# Patient Record
Sex: FEMALE | Race: WHITE | NOT HISPANIC OR LATINO | Employment: OTHER | URBAN - METROPOLITAN AREA
[De-identification: names, ages, dates, MRNs, and addresses within clinical notes are randomized per-mention and may not be internally consistent; named-entity substitution may affect disease eponyms.]

---

## 2018-06-04 NOTE — PROGRESS NOTES
6/6/2018    Kasey Jenkins  1935  [id number]    Discussion and Plan    Based on clinical presentation, appears the patient had urosepsis and a severe cystitis which is gradually resolving  Urinalysis is suspect today will be sent for culture  I have recommended a renal bladder ultrasound to assess overall structure  Dietary and hydration recommendations provided as well  She will continue Myrbetriq on and interim basis  Consideration for antibiotic prophylaxis will be given once the aforementioned studies are completed  All questions answered at this time  1  Urinary tract infection without hematuria, site unspecified  - POCT urine dip  - Urinalysis with microscopic; Future  - Urine culture; Future  - US kidney and bladder with pvr; Future    Assessment      Patient Active Problem List   Diagnosis    Urinary tract infection without hematuria       History of Present Illness    Leonor Lares is a 80 y o  female seen today in regards to a history of urinary tract infection  History is that of having georges urosepsis earlier this year requiring hospitalization  Was found have an E coli UTI at that time  She has persistent symptoms since then consisting of daytime urgency and frequency, significant nocturia and mild dysuria  Flow is otherwise good  Evaluation at that point showed no significant findings  Patient currently is on Myrbetriq  She denied any preceding history of recurrent urinary tract infections or urinary complaints  No prior history of genitourinary surgery  Patient does have a pessary      Urinary Symptom Assessment        Past Medical History  Past Medical History:   Diagnosis Date    Arthritis     Borderline diabetes     watches diet    Dry eye syndrome, bilateral     Full dentures     Gastric ulcer     dx in 9/2017    Glaucoma     had laser    Hyperlipidemia     controlled    Hypertension     controlled    Irregular heart beat 09/2017    "pre-mature" beat-saw cardiologist-12/17-Holter monitor 1/18    PONV (postoperative nausea and vomiting)     Wears glasses        Past Social History  Past Surgical History:   Procedure Laterality Date    APPENDECTOMY      age 6   [place] DILATION AND CURETTAGE OF UTERUS      EGD AND COLONOSCOPY      JOINT REPLACEMENT Bilateral     knees    ROTATOR CUFF REPAIR Left     titanium screw    TONSILLECTOMY         Past Family History  Family History   Problem Relation Age of Onset    Cancer Mother      breast    Heart disease Brother      CHF       Past Social history  Social History     Social History    Marital status: Unknown     Spouse name: N/A    Number of children: N/A    Years of education: N/A     Occupational History    Not on file       Social History Main Topics    Smoking status: Never Smoker    Smokeless tobacco: Never Used    Alcohol use Yes      Comment: occ    Drug use: No    Sexual activity: Not on file     Other Topics Concern    Not on file     Social History Narrative    No narrative on file       Current Medications  Current Outpatient Prescriptions   Medication Sig Dispense Refill    Acetaminophen 325 MG CAPS Take 650 mg by mouth every 6 (six) hours      ALPRAZolam (XANAX) 0 5 mg tablet Take by mouth 2 (two) times a day      ascorbic acid (VITAMIN C) 500 mg tablet Take 500 mg by mouth every morning      brimonidine (ALPHAGAN P) 0 1 % 1 drop 2 (two) times a day Each eye      Calcium Citrate (CITRACAL PO) Take by mouth every morning      CLINDAMYCIN HCL PO Take 600 mg by mouth as needed Prior to invasive procedures      latanoprost (XALATAN) 0 005 % ophthalmic solution Administer 1 drop to both eyes daily at bedtime      Mirabegron (MYRBETRIQ PO) Take by mouth      multivitamin (THERAGRAN) TABS Take 1 tablet by mouth every morning      Polyethyl Glycol-Propyl Glycol (SYSTANE OP) Apply to eye 4 (four) times a day Each eye      pravastatin (PRAVACHOL) 40 mg tablet Take 40 mg by mouth daily at bedtime  quinapril (ACCUPRIL) 10 mg tablet Take 10 mg by mouth every morning      rivaroxaban (XARELTO) 15 mg tablet Take 15 mg by mouth daily with dinner      omeprazole (PriLOSEC) 40 MG capsule Take 40 mg by mouth daily at bedtime       No current facility-administered medications for this visit  Allergies  Allergies   Allergen Reactions    Erythromycin GI Intolerance     "stomach burning", bruises    Minocycline GI Intolerance     "stomach burning", bruises    Penicillins GI Intolerance     "burning stomach", bruising    Propoxyphene GI Intolerance    Tetracycline GI Intolerance     "stomach burning"-bruising    Vicodin [Hydrocodone-Acetaminophen] GI Intolerance     "stomach burning" ,nausea    Avelox [Moxifloxacin] GI Intolerance     Avoids d/t N/V    Percocet [Oxycodone-Acetaminophen] GI Intolerance     Avoids d/t N/V    Sulfa Antibiotics Rash       Past Medical History, Social History, Family History, medications and allergies were reviewed  Review of Systems  Review of Systems   Constitutional: Negative  HENT: Negative  Eyes: Negative  Respiratory: Negative  Cardiovascular: Negative  Gastrointestinal: Negative  Endocrine: Negative  Genitourinary: Positive for urgency  Negative for decreased urine volume, difficulty urinating and frequency  Musculoskeletal: Negative  Skin: Negative  Allergic/Immunologic: Negative  Neurological: Negative  Hematological: Negative  Psychiatric/Behavioral: Negative  Vitals  Vitals:    06/06/18 0823   BP: 140/86   BP Location: Left arm   Patient Position: Sitting   Weight: 98 kg (216 lb)   Height: 5' 4" (1 626 m)         Physical Exam    Physical Exam   Constitutional: She is oriented to person, place, and time  She appears well-developed and well-nourished  Ambulates with walker   HENT:   Head: Normocephalic and atraumatic  Eyes: Pupils are equal, round, and reactive to light  Neck: Normal range of motion  Cardiovascular: Normal rate, regular rhythm and normal heart sounds  Pulmonary/Chest: Effort normal and breath sounds normal    Abdominal: Soft  Bowel sounds are normal  She exhibits no distension  There is no tenderness  There is no rebound and no guarding  Musculoskeletal: Normal range of motion  Neurological: She is alert and oriented to person, place, and time  Skin: Skin is warm, dry and intact  Psychiatric: She has a normal mood and affect  Nursing note and vitals reviewed        Results    Below listed labs, pathology results, and radiology images were personally reviewed:    No results found for: PSA  No results found for: GLUCOSE, CALCIUM, NA, K, CO2, CL, BUN, CREATININE  No results found for: WBC, HGB, HCT, MCV, PLT    Recent Results (from the past 1 hour(s))   POCT urine dip    Collection Time: 06/06/18  8:26 AM   Result Value Ref Range    LEUKOCYTE ESTERASE,UA mod      NITRITE,UA -     SL AMB POCT UROBILINOGEN -     SL AMB POCT URINE PROTEIN -      PH,UA 5 0      BLOOD,UA mod      SPECIFIC GRAVITY,UA 1 010      KETONES,UA -      BILIRUBIN,UA -     GLUCOSE, UA -      COLOR,UA yellow      CLARITY,UA transparent    ]    Care Everywhere Result Report  URINE [address]  Component Name Value Ref Range   Special Requests None     Culture Results 50,000 to 100,000 col/mL  **ESCHERICHIA COLI**     Specimen   Urine - Urine, clean voided (LAB)     Organism Antibiotic Method Susceptibility   **ESCHERICHIA COLI** PIP/TAZO GRAM NEGATIVE SUSCEPTIBILITY <=4: Susceptible    CEFEPIME GRAM NEGATIVE SUSCEPTIBILITY <=1: Susceptible    AZTREONAM GRAM NEGATIVE SUSCEPTIBILITY <=1: Susceptible    MEROPENEM GRAM NEGATIVE SUSCEPTIBILITY <=0 25: Susceptible    GENTAMICIN GRAM NEGATIVE SUSCEPTIBILITY <=1: Susceptible    CIPRO GRAM NEGATIVE SUSCEPTIBILITY <=0 25: Susceptible    TRIMETHOPRIM-SUL GRAM NEGATIVE SUSCEPTIBILITY <=20: Susceptible    AMPICILLIN GRAM NEGATIVE SUSCEPTIBILITY 4: Susceptible    CEFAZOLIN GRAM NEGATIVE SUSCEPTIBILITY <=4: Susceptible    CEFTRIAXONE GRAM NEGATIVE SUSCEPTIBILITY <=1: Susceptible    NITROFURANTOIN GRAM NEGATIVE SUSCEPTIBILITY <=16: Susceptible    CEFTAZIDIME   GRAM NEGATIVE SUSCEPTIBILITY

## 2018-06-06 ENCOUNTER — OFFICE VISIT (OUTPATIENT)
Dept: UROLOGY | Facility: CLINIC | Age: 83
End: 2018-06-06
Payer: MEDICARE

## 2018-06-06 VITALS
SYSTOLIC BLOOD PRESSURE: 140 MMHG | BODY MASS INDEX: 36.88 KG/M2 | DIASTOLIC BLOOD PRESSURE: 86 MMHG | WEIGHT: 216 LBS | HEIGHT: 64 IN

## 2018-06-06 DIAGNOSIS — N39.0 URINARY TRACT INFECTION WITHOUT HEMATURIA, SITE UNSPECIFIED: Primary | ICD-10-CM

## 2018-06-06 LAB
SL AMB  POCT GLUCOSE, UA: NORMAL
SL AMB LEUKOCYTE ESTERASE,UA: NORMAL
SL AMB POCT BILIRUBIN,UA: NORMAL
SL AMB POCT BLOOD,UA: NORMAL
SL AMB POCT CLARITY,UA: NORMAL
SL AMB POCT COLOR,UA: YELLOW
SL AMB POCT KETONES,UA: NORMAL
SL AMB POCT NITRITE,UA: NORMAL
SL AMB POCT PH,UA: 5
SL AMB POCT SPECIFIC GRAVITY,UA: 1.01
SL AMB POCT URINE PROTEIN: NORMAL
SL AMB POCT UROBILINOGEN: NORMAL

## 2018-06-06 PROCEDURE — 81002 URINALYSIS NONAUTO W/O SCOPE: CPT | Performed by: UROLOGY

## 2018-06-06 PROCEDURE — 99204 OFFICE O/P NEW MOD 45 MIN: CPT | Performed by: UROLOGY

## 2018-06-11 ENCOUNTER — APPOINTMENT (OUTPATIENT)
Dept: LAB | Facility: CLINIC | Age: 83
End: 2018-06-11
Payer: MEDICARE

## 2018-06-11 ENCOUNTER — TRANSCRIBE ORDERS (OUTPATIENT)
Dept: LAB | Facility: CLINIC | Age: 83
End: 2018-06-11

## 2018-06-11 DIAGNOSIS — N39.0 URINARY TRACT INFECTION WITHOUT HEMATURIA, SITE UNSPECIFIED: ICD-10-CM

## 2018-06-11 LAB
BACTERIA UR QL AUTO: ABNORMAL /HPF
BILIRUB UR QL STRIP: NEGATIVE
CLARITY UR: CLEAR
COLOR UR: YELLOW
GLUCOSE UR STRIP-MCNC: NEGATIVE MG/DL
HGB UR QL STRIP.AUTO: NEGATIVE
KETONES UR STRIP-MCNC: NEGATIVE MG/DL
LEUKOCYTE ESTERASE UR QL STRIP: ABNORMAL
NITRITE UR QL STRIP: NEGATIVE
NON-SQ EPI CELLS URNS QL MICRO: ABNORMAL /HPF
PH UR STRIP.AUTO: 5.5 [PH] (ref 4.5–8)
PROT UR STRIP-MCNC: NEGATIVE MG/DL
RBC #/AREA URNS AUTO: ABNORMAL /HPF
SP GR UR STRIP.AUTO: 1.01 (ref 1–1.03)
UROBILINOGEN UR QL STRIP.AUTO: 0.2 E.U./DL
WBC #/AREA URNS AUTO: ABNORMAL /HPF

## 2018-06-11 PROCEDURE — 81001 URINALYSIS AUTO W/SCOPE: CPT

## 2018-06-11 PROCEDURE — 87086 URINE CULTURE/COLONY COUNT: CPT

## 2018-06-12 ENCOUNTER — HOSPITAL ENCOUNTER (OUTPATIENT)
Dept: ULTRASOUND IMAGING | Facility: HOSPITAL | Age: 83
Discharge: HOME | End: 2018-06-12
Attending: UROLOGY
Payer: MEDICARE

## 2018-06-12 DIAGNOSIS — N39.0 URINARY TRACT INFECTION WITHOUT HEMATURIA, SITE UNSPECIFIED: ICD-10-CM

## 2018-06-12 LAB — BACTERIA UR CULT: NORMAL

## 2018-06-12 PROCEDURE — 51798 US URINE CAPACITY MEASURE: CPT

## 2018-06-18 NOTE — PROGRESS NOTES
6/20/2018    [facility]  1935  [id number]        Assessment  -History of urinary tract infection  -Stress urinary incontinence  -OAB    Discussion/Plan  Lam Slater is a 80 y o  female being managed by Dr Samm Garibay       -We reviewed results of recent urine culture which did not show any bacterial growth  Ultrasound identifies bilateral renal cysts, appearing simple in nature, no calculi, masses, or hydronephrosis noted  Incidental finding of pelvic cyst, will refer to GYN for further evaluation  Patient reports upcoming appointment with Dr Sheldon Dow in July 2018, patient will bring renal ultrasound report to office visit     -We discussed patient's ongoing urinary symptoms and cost of Myrbetriq  At this time patient will finish current prescription of Myrbetriq  She was advised to call office if she finds urinary symptoms of OAB become bothersome  Would try anticholinergic in the future  We discussed these medications and possible side effects  -If patient does well without any medications, will follow up in 1 year with repeat UA/Culture prior to visit     -All questions answered, patient and  agree with plan     History of Present Illness  80 y o  female with a history of urinary tract infection presents today for follow up  Patient hospitalized March of this year for urosepsis, positive E  Coli at [facility]  Patient states that this was first episode  She reports that initial symptoms prior to hospitalization was generalized weakness, which caused fall  Upon discharge she was prescribed Myrbetriq for urinary urgency, frequency, and nocturia up to 6x/night  Patient states that urinary symptoms were present prior to hospital admission  He states that medication has been costly and unsure if it has resolved urinary symptoms  Patient has pessary for urinary stress incontinence, managed by Dr Sheldon Dow  She denies any gross hematuria, dysuria, or signs of infection        Review of Systems  Review of Systems   Constitutional: Negative  HENT: Negative  Respiratory: Negative  Cardiovascular: Negative  Gastrointestinal: Negative  Genitourinary: Positive for frequency (nocturia up to 3x/night) and urgency  Negative for decreased urine volume, difficulty urinating, dysuria, flank pain and hematuria  Musculoskeletal: Negative  Skin: Negative  Neurological: Negative  Psychiatric/Behavioral: Negative  Past Medical History  Past Medical History:   Diagnosis Date    Arthritis     Borderline diabetes     watches diet    Dry eye syndrome, bilateral     Full dentures     Gastric ulcer     dx in 9/2017    Glaucoma     had laser    Hyperlipidemia     controlled    Hypertension     controlled    Irregular heart beat 09/2017    "pre-mature" beat-saw cardiologist-12/17-Holter monitor 1/18    PONV (postoperative nausea and vomiting)     Wears glasses        Past Social History  Past Surgical History:   Procedure Laterality Date    APPENDECTOMY      age 6   [place] DILATION AND CURETTAGE OF UTERUS      EGD AND COLONOSCOPY      JOINT REPLACEMENT Bilateral     knees    ROTATOR CUFF REPAIR Left     titanium screw    TONSILLECTOMY         Past Family History  Family History   Problem Relation Age of Onset    Cancer Mother         breast    Heart disease Brother         CHF       Past Social history  Social History     Social History    Marital status: /Civil Union     Spouse name: N/A    Number of children: N/A    Years of education: N/A     Occupational History    Not on file       Social History Main Topics    Smoking status: Never Smoker    Smokeless tobacco: Never Used    Alcohol use Yes      Comment: occ    Drug use: No    Sexual activity: Not on file     Other Topics Concern    Not on file     Social History Narrative    No narrative on file       Current Medications  Current Outpatient Prescriptions   Medication Sig Dispense Refill    Acetaminophen 325 MG CAPS Take 650 mg by mouth every 6 (six) hours      ALPRAZolam (XANAX) 0 5 mg tablet Take by mouth 2 (two) times a day      ascorbic acid (VITAMIN C) 500 mg tablet Take 500 mg by mouth every morning      brimonidine (ALPHAGAN P) 0 1 % 1 drop 2 (two) times a day Each eye      Calcium Citrate (CITRACAL PO) Take by mouth every morning      CLINDAMYCIN HCL PO Take 600 mg by mouth as needed Prior to invasive procedures      latanoprost (XALATAN) 0 005 % ophthalmic solution Administer 1 drop to both eyes daily at bedtime      Mirabegron (MYRBETRIQ PO) Take by mouth      multivitamin (THERAGRAN) TABS Take 1 tablet by mouth every morning      omeprazole (PriLOSEC) 40 MG capsule Take 40 mg by mouth daily at bedtime      Polyethyl Glycol-Propyl Glycol (SYSTANE OP) Apply to eye 4 (four) times a day Each eye      pravastatin (PRAVACHOL) 40 mg tablet Take 40 mg by mouth daily at bedtime      quinapril (ACCUPRIL) 10 mg tablet Take 10 mg by mouth every morning      rivaroxaban (XARELTO) 15 mg tablet Take 15 mg by mouth daily with dinner       No current facility-administered medications for this visit  Allergies  Allergies   Allergen Reactions    Erythromycin GI Intolerance     "stomach burning", bruises    Minocycline GI Intolerance     "stomach burning", bruises    Penicillins GI Intolerance     "burning stomach", bruising    Propoxyphene GI Intolerance    Tetracycline GI Intolerance     "stomach burning"-bruising    Vicodin [Hydrocodone-Acetaminophen] GI Intolerance     "stomach burning" ,nausea    Avelox [Moxifloxacin] GI Intolerance     Avoids d/t N/V    Percocet [Oxycodone-Acetaminophen] GI Intolerance     Avoids d/t N/V    Sulfa Antibiotics Rash       Past medical history, social history, family history, medications and allergies were reviewed  Vitals  There were no vitals filed for this visit  Physical Exam  Physical Exam   Constitutional: She is oriented to person, place, and time  She appears well-developed and well-nourished  HENT:   Head: Normocephalic  Eyes: Pupils are equal, round, and reactive to light  Neck: Normal range of motion  Cardiovascular: Normal rate and regular rhythm  Pulmonary/Chest: Effort normal    Abdominal: Soft  Normal appearance  There is no CVA tenderness  Musculoskeletal: Normal range of motion  Uses walker     Neurological: She is alert and oriented to person, place, and time  She has normal reflexes  Skin: Skin is warm and dry  Psychiatric: She has a normal mood and affect  Her behavior is normal  Judgment and thought content normal        Results    I have personally reviewed all pertinent lab results and reviewed with patient  No results found for: GLUCOSE, CALCIUM, NA, K, CO2, CL, BUN, CREATININE  No results found for: WBC, HGB, HCT, MCV, PLT  No results found for this or any previous visit (from the past 1 hour(s))

## 2018-06-20 ENCOUNTER — OFFICE VISIT (OUTPATIENT)
Dept: UROLOGY | Facility: CLINIC | Age: 83
End: 2018-06-20
Payer: MEDICARE

## 2018-06-20 VITALS
HEIGHT: 64 IN | BODY MASS INDEX: 36.88 KG/M2 | DIASTOLIC BLOOD PRESSURE: 80 MMHG | SYSTOLIC BLOOD PRESSURE: 140 MMHG | WEIGHT: 216 LBS

## 2018-06-20 DIAGNOSIS — Z87.440 HISTORY OF UTI: Primary | ICD-10-CM

## 2018-06-20 DIAGNOSIS — N32.81 OAB (OVERACTIVE BLADDER): ICD-10-CM

## 2018-06-20 DIAGNOSIS — N39.3 URINARY, INCONTINENCE, STRESS FEMALE: ICD-10-CM

## 2018-06-20 PROCEDURE — 99213 OFFICE O/P EST LOW 20 MIN: CPT | Performed by: NURSE PRACTITIONER

## 2018-06-20 NOTE — LETTER
June 20, 2018     Paulo Angelo  [address] [id number]    Patient: Marcella Bearden   YOB: 1935   Date of Visit: 6/20/2018       Dear Dr Nadine Farrell:    Thank you for referring Marcella Bearden to me for evaluation  Below are my notes for this consultation  If you have questions, please do not hesitate to call me  I look forward to following your patient along with you  Sincerely,        CLARA Prather        CC: No Recipients  CLARA Prather  6/20/2018  9:25 AM  Sign at close encounter  6/20/2018    Marcella Bearden  1935  [id number]        Assessment  -History of urinary tract infection  -Stress urinary incontinence  -OAB    Discussion/Plan  Dali Price is a 80 y o  female being managed by Dr Maria L Thompson       -We reviewed results of recent urine culture which did not show any bacterial growth  Ultrasound identifies bilateral renal cysts, appearing simple in nature, no calculi, masses, or hydronephrosis noted  Incidental finding of pelvic cyst, will refer to GYN for further evaluation  Patient reports upcoming appointment with Dr Maida Ordonez in July 2018, patient will bring renal ultrasound report to office visit     -We discussed patient's ongoing urinary symptoms and cost of Myrbetriq  At this time patient will finish current prescription of Myrbetriq  She was advised to call office if she finds urinary symptoms of OAB become bothersome  Would try anticholinergic in the future  We discussed these medications and possible side effects  -If patient does well without any medications, will follow up in 1 year with repeat UA/Culture prior to visit     -All questions answered, patient and  agree with plan     History of Present Illness  80 y o  female with a history of urinary tract infection presents today for follow up  Patient hospitalized March of this year for urosepsis, positive E  Coli at [facility]    Patient states that this was first episode  She reports that initial symptoms prior to hospitalization was generalized weakness, which caused fall  Upon discharge she was prescribed Myrbetriq for urinary urgency, frequency, and nocturia up to 6x/night  Patient states that urinary symptoms were present prior to hospital admission  He states that medication has been costly and unsure if it has resolved urinary symptoms  Patient has pessary for urinary stress incontinence, managed by Dr Analisa Mcmillan  She denies any gross hematuria, dysuria, or signs of infection  Review of Systems  Review of Systems   Constitutional: Negative  HENT: Negative  Respiratory: Negative  Cardiovascular: Negative  Gastrointestinal: Negative  Genitourinary: Positive for frequency (nocturia up to 3x/night) and urgency  Negative for decreased urine volume, difficulty urinating, dysuria, flank pain and hematuria  Musculoskeletal: Negative  Skin: Negative  Neurological: Negative  Psychiatric/Behavioral: Negative          Past Medical History  Past Medical History:   Diagnosis Date    Arthritis     Borderline diabetes     watches diet    Dry eye syndrome, bilateral     Full dentures     Gastric ulcer     dx in 9/2017    Glaucoma     had laser    Hyperlipidemia     controlled    Hypertension     controlled    Irregular heart beat 09/2017    "pre-mature" beat-saw cardiologist-12/17-Holter monitor 1/18    PONV (postoperative nausea and vomiting)     Wears glasses        Past Social History  Past Surgical History:   Procedure Laterality Date    APPENDECTOMY      age 6   [employer] DILATION AND CURETTAGE OF UTERUS      EGD AND COLONOSCOPY      JOINT REPLACEMENT Bilateral     knees    ROTATOR CUFF REPAIR Left     titanium screw    TONSILLECTOMY         Past Family History  Family History   Problem Relation Age of Onset    Cancer Mother         breast    Heart disease Brother         CHF       Past Social history  Social History     Social History    Marital status: /Civil Union     Spouse name: N/A    Number of children: N/A    Years of education: N/A     Occupational History    Not on file  Social History Main Topics    Smoking status: Never Smoker    Smokeless tobacco: Never Used    Alcohol use Yes      Comment: occ    Drug use: No    Sexual activity: Not on file     Other Topics Concern    Not on file     Social History Narrative    No narrative on file       Current Medications  Current Outpatient Prescriptions   Medication Sig Dispense Refill    Acetaminophen 325 MG CAPS Take 650 mg by mouth every 6 (six) hours      ALPRAZolam (XANAX) 0 5 mg tablet Take by mouth 2 (two) times a day      ascorbic acid (VITAMIN C) 500 mg tablet Take 500 mg by mouth every morning      brimonidine (ALPHAGAN P) 0 1 % 1 drop 2 (two) times a day Each eye      Calcium Citrate (CITRACAL PO) Take by mouth every morning      CLINDAMYCIN HCL PO Take 600 mg by mouth as needed Prior to invasive procedures      latanoprost (XALATAN) 0 005 % ophthalmic solution Administer 1 drop to both eyes daily at bedtime      Mirabegron (MYRBETRIQ PO) Take by mouth      multivitamin (THERAGRAN) TABS Take 1 tablet by mouth every morning      omeprazole (PriLOSEC) 40 MG capsule Take 40 mg by mouth daily at bedtime      Polyethyl Glycol-Propyl Glycol (SYSTANE OP) Apply to eye 4 (four) times a day Each eye      pravastatin (PRAVACHOL) 40 mg tablet Take 40 mg by mouth daily at bedtime      quinapril (ACCUPRIL) 10 mg tablet Take 10 mg by mouth every morning      rivaroxaban (XARELTO) 15 mg tablet Take 15 mg by mouth daily with dinner       No current facility-administered medications for this visit          Allergies  Allergies   Allergen Reactions    Erythromycin GI Intolerance     "stomach burning", bruises    Minocycline GI Intolerance     "stomach burning", bruises    Penicillins GI Intolerance     "burning stomach", bruising    Propoxyphene GI Intolerance    Tetracycline GI Intolerance     "stomach burning"-bruising    Vicodin [Hydrocodone-Acetaminophen] GI Intolerance     "stomach burning" ,nausea    Avelox [Moxifloxacin] GI Intolerance     Avoids d/t N/V    Percocet [Oxycodone-Acetaminophen] GI Intolerance     Avoids d/t N/V    Sulfa Antibiotics Rash       Past medical history, social history, family history, medications and allergies were reviewed  Vitals  There were no vitals filed for this visit  Physical Exam  Physical Exam   Constitutional: She is oriented to person, place, and time  She appears well-developed and well-nourished  HENT:   Head: Normocephalic  Eyes: Pupils are equal, round, and reactive to light  Neck: Normal range of motion  Cardiovascular: Normal rate and regular rhythm  Pulmonary/Chest: Effort normal    Abdominal: Soft  Normal appearance  There is no CVA tenderness  Musculoskeletal: Normal range of motion  Uses walker     Neurological: She is alert and oriented to person, place, and time  She has normal reflexes  Skin: Skin is warm and dry  Psychiatric: She has a normal mood and affect  Her behavior is normal  Judgment and thought content normal        Results    I have personally reviewed all pertinent lab results and reviewed with patient  No results found for: GLUCOSE, CALCIUM, NA, K, CO2, CL, BUN, CREATININE  No results found for: WBC, HGB, HCT, MCV, PLT  No results found for this or any previous visit (from the past 1 hour(s))

## 2018-06-25 ENCOUNTER — TRANSCRIBE ORDERS (OUTPATIENT)
Dept: ADMINISTRATIVE | Facility: HOSPITAL | Age: 83
End: 2018-06-25

## 2018-06-25 DIAGNOSIS — R19.00 PELVIC MASS: Primary | ICD-10-CM

## 2018-06-29 ENCOUNTER — HOSPITAL ENCOUNTER (OUTPATIENT)
Dept: ULTRASOUND IMAGING | Facility: HOSPITAL | Age: 83
Discharge: HOME | End: 2018-06-29
Payer: MEDICARE

## 2018-06-29 DIAGNOSIS — R19.00 PELVIC MASS: ICD-10-CM

## 2018-06-29 PROCEDURE — 76856 US EXAM PELVIC COMPLETE: CPT

## 2018-09-10 ENCOUNTER — TRANSCRIBE ORDERS (OUTPATIENT)
Dept: ADMINISTRATIVE | Facility: HOSPITAL | Age: 83
End: 2018-09-10

## 2018-09-10 DIAGNOSIS — D49.59 OVARIAN NEOPLASM: Primary | ICD-10-CM

## 2018-10-01 ENCOUNTER — HOSPITAL ENCOUNTER (OUTPATIENT)
Dept: ULTRASOUND IMAGING | Facility: HOSPITAL | Age: 83
Discharge: HOME | End: 2018-10-01
Payer: MEDICARE

## 2018-10-01 DIAGNOSIS — D49.59 OVARIAN NEOPLASM: ICD-10-CM

## 2018-10-01 PROCEDURE — 76856 US EXAM PELVIC COMPLETE: CPT

## 2019-08-07 DIAGNOSIS — Z87.440 HISTORY OF UTI: Primary | ICD-10-CM

## 2019-08-09 NOTE — PROGRESS NOTES
8/13/2019    Vijaya Gray  1935  [id number]    Discussion and Plan      Patient appears to have either asymptomatic bacteriuria or contaminated culture specimen at this juncture  As she is without significant urinary symptoms, no antibiotic will be prescribed at this point  Advised he contact office should new urinary symptoms developed  Patient had noted vaginal itchiness for which he had been using Vaseline cream   I discussed water based a agents are better and have recommended that is still  She will otherwise return in 1 year for routine evaluation  1  Urinary tract infection without hematuria, site unspecified  - Urinalysis with microscopic; Future  - Urine culture; Future      Assessment      Patient Active Problem List   Diagnosis    Urinary tract infection without hematuria       History of Present Illness    Biranna Tate is a 80 y o  female seen today in regards to a history of  Urinary tract infections  She denies any recent changes in urinary pattern noting good flow with complete bladder emptying sensation with occasional frequency  She also has nocturia x3 which is a long-standing pattern  Has commode at bedside  Denies gross hematuria  No fever or chills  Recent urine results show external contamination though E coli present on culture      Urinary Symptom Assessment        Past Medical History  Past Medical History:   Diagnosis Date    Arthritis     Borderline diabetes     watches diet    Dry eye syndrome, bilateral     Full dentures     Gastric ulcer     dx in 9/2017    Glaucoma     had laser    Hyperlipidemia     controlled    Hypertension     controlled    Irregular heart beat 09/2017    "pre-mature" beat-saw cardiologist-12/17-Holter monitor 1/18    PONV (postoperative nausea and vomiting)     Wears glasses        Past Social History  Past Surgical History:   Procedure Laterality Date    APPENDECTOMY      age 6   [employer] [address] EGD AND COLONOSCOPY      JOINT REPLACEMENT Bilateral     knees    ROTATOR CUFF REPAIR Left     titanium screw    TONSILLECTOMY         Past Family History  Family History   Problem Relation Age of Onset    Cancer Mother         breast    Heart disease Brother         CHF       Past Social history  Social History     Socioeconomic History    Marital status: /Civil Union     Spouse name: Not on file    Number of children: Not on file    Years of education: Not on file    Highest education level: Not on file   Occupational History    Not on file   Social Needs    Financial resource strain: Not on file    Food insecurity:     Worry: Not on file     Inability: Not on file    Transportation needs:     Medical: Not on file     Non-medical: Not on file   Tobacco Use    Smoking status: Never Smoker    Smokeless tobacco: Never Used   Substance and Sexual Activity    Alcohol use: Yes     Comment: occ    Drug use: No    Sexual activity: Not on file   Lifestyle    Physical activity:     Days per week: Not on file     Minutes per session: Not on file    Stress: Not on file   Relationships    Social connections:     Talks on phone: Not on file     Gets together: Not on file     Attends Sabianism service: Not on file     Active member of club or organization: Not on file     Attends meetings of clubs or organizations: Not on file     Relationship status: Not on file    Intimate partner violence:     Fear of current or ex partner: Not on file     Emotionally abused: Not on file     Physically abused: Not on file     Forced sexual activity: Not on file   Other Topics Concern    Not on file   Social History Narrative    Not on file       Current Medications  Current Outpatient Medications   Medication Sig Dispense Refill    Acetaminophen 325 MG CAPS Take 650 mg by mouth every 6 (six) hours      ALPRAZolam (XANAX) 0 5 mg tablet Take by mouth 2 (two) times a day      apixaban (ELIQUIS) 5 mg Take 5 mg by mouth 2 (two) times a day      ascorbic acid (VITAMIN C) 500 mg tablet Take 500 mg by mouth every morning      brimonidine (ALPHAGAN P) 0 1 % 1 drop 2 (two) times a day Each eye      Calcium Citrate (CITRACAL PO) Take by mouth every morning      CLINDAMYCIN HCL PO Take 600 mg by mouth as needed Prior to invasive procedures      latanoprost (XALATAN) 0 005 % ophthalmic solution Administer 1 drop to both eyes daily at bedtime      multivitamin (THERAGRAN) TABS Take 1 tablet by mouth every morning      omeprazole (PriLOSEC) 40 MG capsule Take 40 mg by mouth daily at bedtime      Polyethyl Glycol-Propyl Glycol (SYSTANE OP) Apply to eye 4 (four) times a day Each eye      pravastatin (PRAVACHOL) 40 mg tablet Take 40 mg by mouth daily at bedtime      Propylene Glycol (SYSTANE BALANCE) 0 6 % SOLN Systane Balance 0 6 % eye drops      quinapril (ACCUPRIL) 10 mg tablet Take 10 mg by mouth every morning      rivaroxaban (XARELTO) 15 mg tablet Take 15 mg by mouth daily with dinner       No current facility-administered medications for this visit  Allergies  Allergies   Allergen Reactions    Erythromycin GI Intolerance     "stomach burning", bruises    Minocycline GI Intolerance     "stomach burning", bruises    Penicillins GI Intolerance     "burning stomach", bruising    Propoxyphene GI Intolerance    Tetracycline GI Intolerance     "stomach burning"-bruising    Vicodin [Hydrocodone-Acetaminophen] GI Intolerance     "stomach burning" ,nausea    Avelox [Moxifloxacin] GI Intolerance     Avoids d/t N/V    Percocet [Oxycodone-Acetaminophen] GI Intolerance     Avoids d/t N/V    Sulfa Antibiotics Rash       Past Medical History, Social History, Family History, medications and allergies were reviewed  Review of Systems  Review of Systems   Constitutional: Negative  HENT: Negative  Eyes: Negative  Respiratory: Negative  Cardiovascular: Negative  Gastrointestinal: Negative  Endocrine: Negative  Genitourinary: Positive for urgency  Negative for difficulty urinating and hematuria  Musculoskeletal: Negative  Skin: Negative  Allergic/Immunologic: Negative  Neurological: Negative  Hematological: Negative  Psychiatric/Behavioral: Negative  Vitals  Vitals:    08/13/19 0854   BP: 130/88   Pulse: 70   Weight: 95 8 kg (211 lb 3 2 oz)   Height: 5' 4" (1 626 m)         Physical Exam    Physical Exam   Constitutional: She is oriented to person, place, and time  She appears well-developed and well-nourished  Uses walker   HENT:   Head: Normocephalic and atraumatic  Eyes: Pupils are equal, round, and reactive to light  Neck: Normal range of motion  Cardiovascular: Normal rate, regular rhythm and normal heart sounds  Pulmonary/Chest: Effort normal and breath sounds normal    Abdominal: Soft  Bowel sounds are normal  She exhibits no distension  There is no tenderness  There is no rebound and no guarding  Musculoskeletal: Normal range of motion  Neurological: She is alert and oriented to person, place, and time  Skin: Skin is warm, dry and intact  Psychiatric: She has a normal mood and affect  Nursing note and vitals reviewed  Results    Below listed labs, pathology results, and radiology images were personally reviewed:    No results found for: PSA  No results found for: GLUCOSE, CALCIUM, NA, K, CO2, CL, BUN, CREATININE  No results found for: WBC, HGB, HCT, MCV, PLT    No results found for this or any previous visit (from the past 1 hour(s))  ]     Ref Range & Units 8/8/19  9:35 AM   Specific Gravity, Urine 1 003 - 1 030  1 010    pH, Urine 4 5 - 8 0  6    Protein, Urine Negative mg/dL Negative    Glucose, Urine Negative mg/dL Negative    Ketone, Urine Negative mg/dL Negative    Blood, Urine Negative mg/dL 0 03-0  19Abnormal     Leukocytes, Urine Negative /uL 500Abnormal     Nitrite, Urine Negative  Negative    Comment   SEE NOTES    Comment: Microscopic to follow  RBC/HPF 0 - 2 /hpf 21-50    WBC/HPF 0 - 5 /hpf >100    Bacteria Negative  4+Abnormal     Squamous Epithelial Cells 0 - 5 /lpf >10    Transitional Epithelial Cells 0 - 1 /lpf >10    Mucous Threads   Few    Specimen Collected: 08/08/19  9:35 AM Last Resulted: 08/08/19  1:15 PM   Received From: [facility]  Result Received: 08/09/19  7:45 AM

## 2019-08-13 ENCOUNTER — OFFICE VISIT (OUTPATIENT)
Dept: UROLOGY | Facility: CLINIC | Age: 84
End: 2019-08-13
Payer: MEDICARE

## 2019-08-13 VITALS
DIASTOLIC BLOOD PRESSURE: 88 MMHG | HEART RATE: 70 BPM | SYSTOLIC BLOOD PRESSURE: 130 MMHG | HEIGHT: 64 IN | WEIGHT: 211.2 LBS | BODY MASS INDEX: 36.06 KG/M2

## 2019-08-13 DIAGNOSIS — N39.0 URINARY TRACT INFECTION WITHOUT HEMATURIA, SITE UNSPECIFIED: Primary | ICD-10-CM

## 2019-08-13 PROCEDURE — 99214 OFFICE O/P EST MOD 30 MIN: CPT | Performed by: UROLOGY

## 2020-01-09 ENCOUNTER — OFFICE VISIT (OUTPATIENT)
Dept: UROLOGY | Facility: CLINIC | Age: 85
End: 2020-01-09
Payer: MEDICARE

## 2020-01-09 VITALS
HEIGHT: 64 IN | WEIGHT: 214 LBS | SYSTOLIC BLOOD PRESSURE: 122 MMHG | DIASTOLIC BLOOD PRESSURE: 78 MMHG | BODY MASS INDEX: 36.54 KG/M2 | HEART RATE: 78 BPM

## 2020-01-09 DIAGNOSIS — N39.3 URINARY, INCONTINENCE, STRESS FEMALE: ICD-10-CM

## 2020-01-09 DIAGNOSIS — N32.81 OAB (OVERACTIVE BLADDER): Primary | ICD-10-CM

## 2020-01-09 DIAGNOSIS — N39.0 URINARY TRACT INFECTION WITHOUT HEMATURIA, SITE UNSPECIFIED: ICD-10-CM

## 2020-01-09 LAB — POST-VOID RESIDUAL VOLUME, ML POC: 83 ML

## 2020-01-09 PROCEDURE — 99213 OFFICE O/P EST LOW 20 MIN: CPT | Performed by: PHYSICIAN ASSISTANT

## 2020-01-09 PROCEDURE — 51798 US URINE CAPACITY MEASURE: CPT | Performed by: PHYSICIAN ASSISTANT

## 2020-01-09 RX ORDER — TROSPIUM CHLORIDE 20 MG/1
20 TABLET, FILM COATED ORAL 2 TIMES DAILY
Qty: 60 TABLET | Refills: 3 | Status: SHIPPED | OUTPATIENT
Start: 2020-01-09 | End: 2020-06-04 | Stop reason: DRUGHIGH

## 2020-01-09 NOTE — PROGRESS NOTES
1  OAB (overactive bladder)    - POCT Measure PVR    2  Urinary, incontinence, stress female    - POCT Measure PVR    3  Urinary tract infection without hematuria, site unspecified    - POCT Measure PVR          Assessment and plan:       1  Urinary frequency  - Patient has previously tried Myrbetriq with which worked well but was cost   - We discussed a trial of trospium as this has the lowest risk of confusion of the anticholinergic medications  - She will return in 6 weeks for symptom reassessment and will have PVR performed at that time  Corrine Cowan PA-C      Chief Complaint     Chief Complaint   Patient presents with    Urinary Tract Infection    Nocturia    OAB         History of Present Illness     Linus Severino is a 80 y o  female patient previously managed by Dr Aditya Lira for lower urinary tract symptoms including frequency, primarily nocturia, and a urinary tract infection  Patient was previously trialed on Myrbetriq which cost she and her  approximately $500 per month  This was discontinued in October and patient has had significant symptom return since that time  She reports that she is currently waking up 5 times nightly to urinate  She has a bedside commode to limit her need for ambulation overnight as she requires a walker  She is emptying reasonably well today with a PVR of 83 mL  She does have a history of bladder prolapse which is treated with a pessary  Patient denies any leakage of urine  Laboratory     No results found for: CREATININE    No results found for: PSA    Recent Results (from the past 1 hour(s))   POCT Measure PVR    Collection Time: 01/09/20 11:19 AM   Result Value Ref Range    POST-VOID RESIDUAL VOLUME, ML POC 83 mL         Review of Systems     Review of Systems   Constitutional: Negative for chills and fever  HENT: Negative  Eyes: Negative  Respiratory: Negative for shortness of breath  Cardiovascular: Negative for chest pain  Gastrointestinal: Negative for constipation, diarrhea, nausea and vomiting  Genitourinary: Positive for frequency and urgency  Negative for difficulty urinating, dysuria, enuresis, flank pain and hematuria  Musculoskeletal: Negative  Allergies     Allergies   Allergen Reactions    Erythromycin GI Intolerance     "stomach burning", bruises    Minocycline GI Intolerance     "stomach burning", bruises    Penicillins GI Intolerance     "burning stomach", bruising    Propoxyphene GI Intolerance    Tetracycline GI Intolerance     "stomach burning"-bruising    Vicodin [Hydrocodone-Acetaminophen] GI Intolerance     "stomach burning" ,nausea    Avelox [Moxifloxacin] GI Intolerance     Avoids d/t N/V    Percocet [Oxycodone-Acetaminophen] GI Intolerance     Avoids d/t N/V    Sulfa Antibiotics Rash       Physical Exam     Physical Exam   Constitutional: She is oriented to person, place, and time  She appears well-developed and well-nourished  No distress  Obese   HENT:   Head: Normocephalic and atraumatic  Right Ear: External ear normal    Left Ear: External ear normal    Nose: Nose normal    Eyes: Right eye exhibits no discharge  Left eye exhibits no discharge  No scleral icterus  Cardiovascular: Normal rate  Pulmonary/Chest: Effort normal    Musculoskeletal:   Ambulates with assistance of a 4 wheel walker  Neurological: She is alert and oriented to person, place, and time  Skin: Skin is warm and dry  She is not diaphoretic  Psychiatric: She has a normal mood and affect  Her behavior is normal  Judgment and thought content normal    Nursing note and vitals reviewed          Vital Signs     Vitals:    01/09/20 1115   BP: 122/78   BP Location: Left arm   Patient Position: Sitting   Cuff Size: Adult   Pulse: 78   Weight: 97 1 kg (214 lb)   Height: 5' 4" (1 626 m)         Current Medications       Current Outpatient Medications:     Acetaminophen 325 MG CAPS, Take 650 mg by mouth every 6 (six) hours, Disp: , Rfl:     ALPRAZolam (XANAX) 0 5 mg tablet, Take by mouth 2 (two) times a day, Disp: , Rfl:     apixaban (ELIQUIS) 5 mg, Take 5 mg by mouth 2 (two) times a day, Disp: , Rfl:     ascorbic acid (VITAMIN C) 500 mg tablet, Take 500 mg by mouth every morning, Disp: , Rfl:     brimonidine (ALPHAGAN P) 0 1 %, 1 drop 2 (two) times a day Each eye, Disp: , Rfl:     Calcium Citrate (CITRACAL PO), Take by mouth every morning, Disp: , Rfl:     CLINDAMYCIN HCL PO, Take 600 mg by mouth as needed Prior to invasive procedures, Disp: , Rfl:     latanoprost (XALATAN) 0 005 % ophthalmic solution, Administer 1 drop to both eyes daily at bedtime, Disp: , Rfl:     multivitamin (THERAGRAN) TABS, Take 1 tablet by mouth every morning, Disp: , Rfl:     omeprazole (PriLOSEC) 40 MG capsule, Take 40 mg by mouth daily at bedtime, Disp: , Rfl:     Polyethyl Glycol-Propyl Glycol (SYSTANE OP), Apply to eye 4 (four) times a day Each eye, Disp: , Rfl:     pravastatin (PRAVACHOL) 40 mg tablet, Take 40 mg by mouth daily at bedtime, Disp: , Rfl:     Propylene Glycol (SYSTANE BALANCE) 0 6 % SOLN, Systane Balance 0 6 % eye drops, Disp: , Rfl:     quinapril (ACCUPRIL) 10 mg tablet, Take 10 mg by mouth every morning, Disp: , Rfl:     rivaroxaban (XARELTO) 15 mg tablet, Take 15 mg by mouth daily with dinner, Disp: , Rfl:       Active Problems     Patient Active Problem List   Diagnosis    Urinary tract infection without hematuria         Past Medical History     Past Medical History:   Diagnosis Date    Arthritis     Borderline diabetes     watches diet    Dry eye syndrome, bilateral     Full dentures     Gastric ulcer     dx in 9/2017    Glaucoma     had laser    Hyperlipidemia     controlled    Hypertension     controlled    Irregular heart beat 09/2017    "pre-mature" beat-saw cardiologist-12/17-Holter monitor 1/18    PONV (postoperative nausea and vomiting)     Presence of intracervical pessary     Wears glasses Surgical History     Past Surgical History:   Procedure Laterality Date    APPENDECTOMY      age 11    DILATION AND CURETTAGE OF UTERUS      EGD AND COLONOSCOPY      JOINT REPLACEMENT Bilateral     knees    ROTATOR CUFF REPAIR Left     titanium screw    TONSILLECTOMY           Family History     Family History   Problem Relation Age of Onset    Cancer Mother         breast    Heart disease Brother         CHF         Social History     Social History       Radiology

## 2020-01-28 ENCOUNTER — TELEPHONE (OUTPATIENT)
Dept: UROLOGY | Facility: AMBULATORY SURGERY CENTER | Age: 85
End: 2020-01-28

## 2020-01-28 NOTE — TELEPHONE ENCOUNTER
Patient managed by Jocelyn Tadeo is calling to say her nocturia is worse and medication is not helping her  Was up over 5 times and not getting enough sleep  Please advise

## 2020-01-28 NOTE — TELEPHONE ENCOUNTER
Called and spoke to patient  Patient last saw Gael Espana on 01/09/2020 for OAB (overactive bladder), Urinary, incontinence, stress female, Urinary tract infection without hematuria, site unspecified  Patient was prescribed trospium chloride (SANCTURA) 20 mg tablet  Patient has previously tried Myrbetriq with which worked well but was cost     Patient stated that she took the medication for one week and is not noticing any difference  Patient states that she is not getting enough sleep and is always tired because she is getting up 5+ times at night  Patient stated that it has gotten so bad that she has a commode next to her bed or she will not make it to the bathroom  Explained to patient that it does take time for medications to work but this note will be sent to the PA for further recommendations and someone from the office will be in contact with her  Patient understood

## 2020-01-29 NOTE — TELEPHONE ENCOUNTER
Called and spoke to patient  Explained to patient that Unfortunately the side effect profile for other medications in this category are too risky for Mol  She can continue this medication for a few more weeks to see if she has improvement with further use  We could also potentially consider bladder botox  She should follow up as scheduled  Patient understood

## 2020-01-29 NOTE — TELEPHONE ENCOUNTER
Unfortunately the side effect profile for other medications in this category are too risky for Mol  She can continue this medication for a few more weeks to see if she has improvement with further use  We could also potentially consider bladder botox  She should follow up as scheduled

## 2020-02-21 ENCOUNTER — OFFICE VISIT (OUTPATIENT)
Dept: UROLOGY | Facility: CLINIC | Age: 85
End: 2020-02-21
Payer: MEDICARE

## 2020-02-21 VITALS
HEART RATE: 86 BPM | HEIGHT: 64 IN | WEIGHT: 213 LBS | SYSTOLIC BLOOD PRESSURE: 164 MMHG | DIASTOLIC BLOOD PRESSURE: 90 MMHG | BODY MASS INDEX: 36.37 KG/M2

## 2020-02-21 DIAGNOSIS — N39.3 URINARY, INCONTINENCE, STRESS FEMALE: Primary | ICD-10-CM

## 2020-02-21 LAB
POST-VOID RESIDUAL VOLUME, ML POC: 105 ML
SL AMB  POCT GLUCOSE, UA: NORMAL
SL AMB LEUKOCYTE ESTERASE,UA: NORMAL
SL AMB POCT BILIRUBIN,UA: NORMAL
SL AMB POCT BLOOD,UA: NORMAL
SL AMB POCT CLARITY,UA: NORMAL
SL AMB POCT COLOR,UA: YELLOW
SL AMB POCT KETONES,UA: NORMAL
SL AMB POCT NITRITE,UA: NORMAL
SL AMB POCT PH,UA: 5
SL AMB POCT SPECIFIC GRAVITY,UA: 1.01
SL AMB POCT URINE PROTEIN: NORMAL
SL AMB POCT UROBILINOGEN: 0.2

## 2020-02-21 PROCEDURE — 51798 US URINE CAPACITY MEASURE: CPT | Performed by: PHYSICIAN ASSISTANT

## 2020-02-21 PROCEDURE — 99213 OFFICE O/P EST LOW 20 MIN: CPT | Performed by: PHYSICIAN ASSISTANT

## 2020-02-21 PROCEDURE — 81002 URINALYSIS NONAUTO W/O SCOPE: CPT | Performed by: PHYSICIAN ASSISTANT

## 2020-02-21 NOTE — PROGRESS NOTES
1  Urinary, incontinence, stress female  POCT Measure PVR    POCT urine dip         Assessment and plan:       1  Urinary frequency  -  Myrbetriq as been cost prohibitive for the patient  - trospium has only mild improvement  I did review with the patient that she is not a candidate for other anticholinergics due to the risk of CNS side effects  - reviewed tertiary options including tibial nerve stimulation and bladder Botox  Patient wishes to avoid anesthesia and does not feel that she would be able to attend the frequent sessions needed for PTNS  Patient declines pelvic floor physical therapy  - patient will continue on trospium daily as there is some urinary benefit for her  Side effect profile reviewed  Follow-up 1 year for symptom reassessment  All questions answered  Anali Grande PA-C      Chief Complaint     Chief Complaint   Patient presents with    Overactive bladder     6 week follow up for symtpom reassessment    Urinary Incontinence    Urinary Tract Infection       History of Present Illness     Amber Hughes is a 80 y o  female patient previously managed by Dr Loreto Naranjo for lower urinary tract symptoms including frequency, primarily nocturia, and a urinary tract infection  Patient is previously been noted to have colonization of her urine  Urine specimen in the office today does reveal leukocytes or nitrites however patient is completely asymptomatic  She continues to have her baseline urinary frequency which was previously well managed on Myrbetriq  Unfortunately this became cost prohibitive in she was transition to trospium  Patient does not feel that she is having as good as symptom response on the trospium  Denies any dysuria, gross hematuria, suprapubic pressure, flank pain, nausea, vomiting, fevers, or chills  She does have a history of bladder prolapse which is treated with a pessary  Patient denies any leakage of urine      PVR 105mL    Review of Systems Review of Systems   Constitutional: Negative for chills and fever  HENT: Negative  Eyes: Negative  Respiratory: Negative for shortness of breath  Cardiovascular: Negative for chest pain  Gastrointestinal: Negative for constipation, diarrhea, nausea and vomiting  Genitourinary: Positive for frequency and urgency  Negative for difficulty urinating, dysuria, enuresis, flank pain and hematuria  Musculoskeletal: Negative  Allergies     Allergies   Allergen Reactions    Erythromycin GI Intolerance     "stomach burning", bruises    Minocycline GI Intolerance     "stomach burning", bruises    Penicillins GI Intolerance     "burning stomach", bruising    Propoxyphene GI Intolerance    Tetracycline GI Intolerance     "stomach burning"-bruising    Vicodin [Hydrocodone-Acetaminophen] GI Intolerance     "stomach burning" ,nausea    Prednisone     Avelox [Moxifloxacin] GI Intolerance     Avoids d/t N/V    Percocet [Oxycodone-Acetaminophen] GI Intolerance     Avoids d/t N/V    Sulfa Antibiotics Rash       Physical Exam     Physical Exam   Constitutional: She is oriented to person, place, and time  She appears well-developed and well-nourished  No distress  Obese   HENT:   Head: Normocephalic and atraumatic  Right Ear: External ear normal    Left Ear: External ear normal    Nose: Nose normal    Eyes: Right eye exhibits no discharge  Left eye exhibits no discharge  No scleral icterus  Cardiovascular: Normal rate  Pulmonary/Chest: Effort normal    Musculoskeletal:   Ambulates with assistance of a 4 wheel walker  Neurological: She is alert and oriented to person, place, and time  Skin: Skin is warm and dry  She is not diaphoretic  Psychiatric: She has a normal mood and affect  Her behavior is normal  Judgment and thought content normal    Nursing note and vitals reviewed          Vital Signs     Vitals:    02/21/20 0933   BP: 164/90   BP Location: Left arm   Patient Position: Sitting Cuff Size: Adult   Pulse: 86   Weight: 96 6 kg (213 lb)   Height: 5' 4" (1 626 m)         Current Medications       Current Outpatient Medications:     Acetaminophen 325 MG CAPS, Take 650 mg by mouth every 6 (six) hours, Disp: , Rfl:     ALPRAZolam (XANAX) 0 5 mg tablet, Take by mouth 2 (two) times a day, Disp: , Rfl:     apixaban (ELIQUIS) 5 mg, Take 5 mg by mouth 2 (two) times a day, Disp: , Rfl:     brimonidine (ALPHAGAN P) 0 1 %, 1 drop 2 (two) times a day Each eye, Disp: , Rfl:     Calcium Citrate (CITRACAL PO), Take by mouth every morning, Disp: , Rfl:     CLINDAMYCIN HCL PO, Take 600 mg by mouth as needed Prior to invasive procedures, Disp: , Rfl:     latanoprost (XALATAN) 0 005 % ophthalmic solution, Administer 1 drop to both eyes daily at bedtime, Disp: , Rfl:     multivitamin (THERAGRAN) TABS, Take 1 tablet by mouth every morning, Disp: , Rfl:     omeprazole (PriLOSEC) 40 MG capsule, Take 40 mg by mouth daily at bedtime, Disp: , Rfl:     Polyethyl Glycol-Propyl Glycol (SYSTANE OP), Apply to eye 4 (four) times a day Each eye, Disp: , Rfl:     pravastatin (PRAVACHOL) 40 mg tablet, Take 40 mg by mouth daily at bedtime, Disp: , Rfl:     Propylene Glycol (SYSTANE BALANCE) 0 6 % SOLN, Systane Balance 0 6 % eye drops, Disp: , Rfl:     quinapril (ACCUPRIL) 10 mg tablet, Take 10 mg by mouth every morning, Disp: , Rfl:     trospium chloride (SANCTURA) 20 mg tablet, Take 1 tablet (20 mg total) by mouth 2 (two) times a day, Disp: 60 tablet, Rfl: 3    ascorbic acid (VITAMIN C) 500 mg tablet, Take 500 mg by mouth every morning, Disp: , Rfl:     rivaroxaban (XARELTO) 15 mg tablet, Take 15 mg by mouth daily with dinner, Disp: , Rfl:       Active Problems     Patient Active Problem List   Diagnosis    Urinary tract infection without hematuria         Past Medical History     Past Medical History:   Diagnosis Date    Arthritis     Borderline diabetes     watches diet    Dry eye syndrome, bilateral     Full dentures     Gastric ulcer     dx in 9/2017    Glaucoma     had laser    Hyperlipidemia     controlled    Hypertension     controlled    Irregular heart beat 09/2017    "pre-mature" beat-saw cardiologist-12/17-Holter monitor 1/18    PONV (postoperative nausea and vomiting)     Presence of intracervical pessary     Wears glasses          Surgical History     Past Surgical History:   Procedure Laterality Date    APPENDECTOMY      age 6   [facility]URE[facility] UTERUS      EGD AND COLONOSCOPY      JOINT REPLACEMENT Bilateral     knees    ROTATOR CUFF REPAIR Left     titanium screw    TONSILLECTOMY           Family History     Family History   Problem Relation Age of Onset    Cancer Mother         breast    Heart disease Brother         CHF         Social History     Social History       Radiology

## 2020-03-17 NOTE — PROGRESS NOTES
Physician Pessary Appointment    Oliverio Nelson  20    Chief Complaint   Patient presents with    pessary check     patient here for 5 mos  f/u pessary check       Subjective:    Leukorrhea: none      Pelvic Pain/Discomfort: none      Other Problems: none      Objective:     Current Pessary Type: gelhorn  Number: 2    Ulceration: none    Excoriations: none      Vaginal Atrophy: moderate    Assessment:    vaginal inspection    N/A    gelhorn    Plan:  44-year-old female presents for pessary check  Pessary removed cleaned and replaced without difficulty  Patient tolerated procedure well  Patient is reporting some intermittent rectal bleeding  I discussed with her that she should follow-up with her gastroenterologist   She sees Dr Annamaria Thomas and will notify him  Follow-up in 4-5 months for pessary check    Physician Pessary Appointment    Oliverio Nelson  20    No chief complaint on file  Subjective:  Patient presents for pessary check complaining of some intermittent rectal bleeding  She has no other complaints at this time      Leukorrhea: none  How Lon    Pelvic Pain/Discomfort: none   How Lon    Other Problems: none  How Lon    Objective:     Current Pessary Type: gelhorn  Number: 2    Ulceration: none  Location: 0    Excoriations: none  Location:  None    Vaginal Atrophy: moderate    Assessment:    pessary cleaned & replaced    N/A    gelhorn    Plan:  Follow-up in 4-5 months for pessary check  Patient will contact her gastroenterologist for as some occasional intermittent rectal bleeding

## 2020-03-18 ENCOUNTER — OFFICE VISIT (OUTPATIENT)
Dept: OBGYN CLINIC | Facility: CLINIC | Age: 85
End: 2020-03-18
Payer: MEDICARE

## 2020-03-18 VITALS
WEIGHT: 208 LBS | HEIGHT: 65 IN | BODY MASS INDEX: 34.66 KG/M2 | SYSTOLIC BLOOD PRESSURE: 144 MMHG | DIASTOLIC BLOOD PRESSURE: 80 MMHG

## 2020-03-18 DIAGNOSIS — N81.4 UTERINE PROLAPSE: Primary | ICD-10-CM

## 2020-03-18 DIAGNOSIS — N81.2 INCOMPLETE UTEROVAGINAL PROLAPSE: ICD-10-CM

## 2020-03-18 PROCEDURE — 99212 OFFICE O/P EST SF 10 MIN: CPT | Performed by: OBSTETRICS & GYNECOLOGY

## 2020-06-04 DIAGNOSIS — N39.3 URINARY, INCONTINENCE, STRESS FEMALE: Primary | ICD-10-CM

## 2020-06-04 DIAGNOSIS — R35.0 URINARY FREQUENCY: ICD-10-CM

## 2020-06-04 DIAGNOSIS — R39.15 URINARY URGENCY: ICD-10-CM

## 2020-07-21 ENCOUNTER — OFFICE VISIT (OUTPATIENT)
Dept: OBGYN CLINIC | Facility: CLINIC | Age: 85
End: 2020-07-21
Payer: MEDICARE

## 2020-07-21 VITALS — SYSTOLIC BLOOD PRESSURE: 160 MMHG | BODY MASS INDEX: 35.61 KG/M2 | WEIGHT: 214 LBS | DIASTOLIC BLOOD PRESSURE: 72 MMHG

## 2020-07-21 DIAGNOSIS — N81.11 MIDLINE CYSTOCELE: ICD-10-CM

## 2020-07-21 DIAGNOSIS — N81.4 UTERINE PROLAPSE: Primary | ICD-10-CM

## 2020-07-21 DIAGNOSIS — N81.6 RECTOCELE: ICD-10-CM

## 2020-07-21 PROCEDURE — 99213 OFFICE O/P EST LOW 20 MIN: CPT | Performed by: OBSTETRICS & GYNECOLOGY

## 2020-07-21 NOTE — PROGRESS NOTES
Assessment/Plan:  80-year-old female presents for pessary check  Gellhorn pessary removed clean replaced  Minimal irritation noted minimal discharge noted minimal bleeding noted with removal   Follow-up in 6 months for pessary check     Diagnoses and all orders for this visit:    Uterine prolapse    Midline cystocele    Rectocele          Subjective:     Patient ID: Tayler Fitzgerald is a 80 y o  female  80-year-old  4 para 3 female presents for pessary check  She has no complaints with her pessary at this time  She reports minimal vaginal discharge  Review of Systems   Gastrointestinal: Negative for abdominal distention, abdominal pain and rectal pain  Genitourinary: Positive for vaginal bleeding (Minimal vaginal bleeding) and vaginal discharge ( minimal vaginal discharge)  Negative for difficulty urinating, dysuria, flank pain, frequency, genital sores, hematuria, pelvic pain and vaginal pain  Objective:     Physical Exam   Constitutional: She is oriented to person, place, and time  She appears well-developed and well-nourished  Abdominal: Soft  She exhibits no distension and no mass  There is no tenderness  There is no rebound and no guarding  No hernia  Genitourinary: Uterus is deviated  Cervix exhibits no motion tenderness, no discharge and no friability  Right adnexum displays no mass, no tenderness and no fullness  Left adnexum displays no mass, no tenderness and no fullness  No erythema, tenderness or bleeding in the vagina  There are signs of injury in the vagina  No vaginal discharge found  Neurological: She is alert and oriented to person, place, and time  Psychiatric: She has a normal mood and affect   Her behavior is normal  Judgment and thought content normal

## 2020-07-22 ENCOUNTER — OFFICE VISIT (OUTPATIENT)
Dept: OBGYN CLINIC | Facility: CLINIC | Age: 85
End: 2020-07-22
Payer: MEDICARE

## 2020-07-22 VITALS
HEIGHT: 65 IN | SYSTOLIC BLOOD PRESSURE: 160 MMHG | WEIGHT: 214 LBS | DIASTOLIC BLOOD PRESSURE: 70 MMHG | BODY MASS INDEX: 35.65 KG/M2

## 2020-07-22 DIAGNOSIS — N81.6 RECTOCELE: ICD-10-CM

## 2020-07-22 DIAGNOSIS — N81.4 UTERINE PROLAPSE: Primary | ICD-10-CM

## 2020-07-22 DIAGNOSIS — N81.11 CYSTOCELE, MIDLINE: ICD-10-CM

## 2020-07-22 PROCEDURE — 99213 OFFICE O/P EST LOW 20 MIN: CPT | Performed by: OBSTETRICS & GYNECOLOGY

## 2020-07-22 NOTE — PROGRESS NOTES
Assessment/Plan:  77-year-old female presents complaining that her pessary fell out after being cleaned and replaced yesterday  Pessary was cleaned today and replaced without difficulty  Pessary seems to be in good position and was stable when patient Valsalva and cough  Patient instructed to follow up in she has difficulty again otherwise she may resume her 6 month follow-up  Diagnoses and all orders for this visit:    Uterine prolapse    Cystocele, midline    Rectocele          Subjective:     Patient ID: Blair Sher is a 80 y o  female  77-year-old  4 para 3 female presents stating that her pessary fell out  She had a replaced yesterday  She went home and had difficulty urinating  When she sat on the toilet to try to urinate the pessary came part of the way out  Her  help her removed completely  She presents today for replacement of her pessary  Review of Systems   Gastrointestinal: Negative for abdominal distention, abdominal pain and rectal pain  Genitourinary: Positive for vaginal bleeding (Minimal vaginal bleeding) and vaginal discharge ( minimal vaginal discharge)  Negative for difficulty urinating, dysuria, flank pain, frequency, genital sores, hematuria, pelvic pain and vaginal pain  Objective:     Physical Exam   Constitutional: She is oriented to person, place, and time  She appears well-developed and well-nourished  Abdominal: Soft  She exhibits no distension and no mass  There is no tenderness  There is no rebound and no guarding  No hernia  Genitourinary: Uterus is deviated  Cervix exhibits no motion tenderness, no discharge and no friability  Right adnexum displays no mass, no tenderness and no fullness  Left adnexum displays no mass, no tenderness and no fullness  No erythema, tenderness or bleeding in the vagina  There are signs of injury in the vagina  No vaginal discharge found     Neurological: She is alert and oriented to person, place, and time    Psychiatric: She has a normal mood and affect   Her behavior is normal  Judgment and thought content normal

## 2020-08-27 ENCOUNTER — OFFICE VISIT (OUTPATIENT)
Dept: OBGYN CLINIC | Facility: CLINIC | Age: 85
End: 2020-08-27
Payer: MEDICARE

## 2020-08-27 VITALS
SYSTOLIC BLOOD PRESSURE: 118 MMHG | DIASTOLIC BLOOD PRESSURE: 80 MMHG | WEIGHT: 205.8 LBS | HEIGHT: 65 IN | BODY MASS INDEX: 34.29 KG/M2

## 2020-08-27 DIAGNOSIS — N83.202 LEFT OVARIAN CYST: Primary | ICD-10-CM

## 2020-08-27 DIAGNOSIS — D49.59 OVARIAN NEOPLASM: ICD-10-CM

## 2020-08-27 PROCEDURE — 99213 OFFICE O/P EST LOW 20 MIN: CPT | Performed by: OBSTETRICS & GYNECOLOGY

## 2020-08-27 NOTE — PATIENT INSTRUCTIONS
Ovarian Cyst   WHAT YOU NEED TO KNOW:   An ovarian cyst is a sac that grows on an ovary  This sac usually contains fluid, but may sometimes have blood or tissue in it  Most ovarian cysts are harmless and go away without treatment in a few months  Some cysts can grow large, cause pain, or break open  DISCHARGE INSTRUCTIONS:   Call 911 for any of the following:   · You are too weak or dizzy to stand up  Return to the emergency department if:   · You have severe abdominal pain  The pain may be sharp and sudden  · You have a fever  Contact your healthcare provider if:   · Your periods are early, late, or more painful than usual     · You have bleeding from your vagina that is not your period  · You have abdominal pain all the time  · Your abdomen is swollen  · You have feelings of fullness, pressure, or discomfort in your abdomen  · You have trouble urinating or emptying your bladder completely  · You have pain during sex  · You are losing weight without trying  · You have questions or concerns about your condition or care  Medicines: You may need any of the following:  · NSAIDs , such as ibuprofen, help decrease swelling, pain, and fever  This medicine is available with or without a doctor's order  NSAIDs can cause stomach bleeding or kidney problems in certain people  If you take blood thinner medicine, always ask if NSAIDs are safe for you  Always read the medicine label and follow directions  Do not give these medicines to children under 10months of age without direction from your child's healthcare provider  · Birth control pills  may help to control your periods, prevent cysts, or cause them to shrink  · Take your medicine as directed  Contact your healthcare provider if you think your medicine is not helping or if you have side effects  Tell him or her if you are allergic to any medicine  Keep a list of the medicines, vitamins, and herbs you take   Include the amounts, and when and why you take them  Bring the list or the pill bottles to follow-up visits  Carry your medicine list with you in case of an emergency  Follow up with your healthcare provider as directed:  Write down your questions so you remember to ask them during your visits  Apply heat to decrease pain and cramping:  Sit in a warm bath, or place a heating pad (turned on low) or a hot water bottle on your abdomen  Do this for 15 to 20 minutes every hour for as many days as directed  © 2017 [employer] [employer] Information is for End User's use only and may not be sold, redistributed or otherwise used for commercial purposes  All illustrations and images included in CareNotes® are the copyrighted property of [employer]  or [employer]  The above information is an  only  It is not intended as medical advice for individual conditions or treatments  Talk to your doctor, nurse or pharmacist before following any medical regimen to see if it is safe and effective for you

## 2020-08-27 NOTE — PROGRESS NOTES
Assessment/Plan:  80-year-old female presents as a follow-up to a recent CT scan that revealed the following:  Suspected left ovarian cyst measures 7 9 x 5 9 cm  Difficult to palpate on examination secondary to patient's body habitus  Check   Check pelvic ultrasound  Patient will come in the day of her ultrasound to removed pessary prior to her ultrasound  Follow-up and treat according to results  Diagnoses and all orders for this visit:    Left ovarian cyst  -     US pelvis complete w transvaginal; Future  -     ; Future    Ovarian neoplasm  -     ; Future          Subjective:      Patient ID: Yamilka Blanc is a 80 y o  female  80-year-old  4 para 3 female presents early follow-up to a recent CT scan of the abdomen and pelvis which revealed a 7 x 9 cm suspected left ovarian cyst   Patient reports no pain  She has no dysuria or change in her bowel habits  She denies any vaginal bleeding  The following portions of the patient's history were reviewed and updated as appropriate: allergies, current medications, past family history, past medical history, past social history, past surgical history and problem list     Review of Systems   Constitutional: Negative for appetite change, fatigue and unexpected weight change  Respiratory: Negative for cough, shortness of breath and wheezing  Cardiovascular: Negative for leg swelling  Gastrointestinal: Negative for abdominal distention, abdominal pain, anal bleeding, blood in stool, constipation and diarrhea  Genitourinary: Negative for difficulty urinating, dysuria, frequency, genital sores, hematuria, pelvic pain, urgency, vaginal bleeding, vaginal discharge and vaginal pain  Musculoskeletal: Positive for gait problem, joint swelling and myalgias  Negative for arthralgias  Neurological: Negative for headaches  Psychiatric/Behavioral: Negative for sleep disturbance  The patient is not nervous/anxious        vulva and vagina:  Normal without lesions, Gellhorn pessary palpated  Uterus:  Palpated beyond pessary normal size shape and contour  Adnexa:  No palpable masses or tenderness secondary to patient's body habitus  Rectum:  No apparent hemorrhoids    Objective:      /80 (BP Location: Left arm, Patient Position: Sitting, Cuff Size: Standard)   Ht 5' 5" (1 651 m)   Wt 93 4 kg (205 lb 12 8 oz)   BMI 34 25 kg/m²          Physical Exam  Vitals signs and nursing note reviewed  Exam conducted with a chaperone present  Constitutional:       Appearance: She is well-developed  She is obese  Neck:      Thyroid: No thyromegaly  Abdominal:      General: Bowel sounds are normal  There is no distension  Palpations: Abdomen is soft  Tenderness: There is no abdominal tenderness  Genitourinary:     General: Normal vulva  Vagina: No vaginal discharge  Rectum: Normal    Lymphadenopathy:      Lower Body: No right inguinal adenopathy  No left inguinal adenopathy  Skin:     General: Skin is warm and dry  Neurological:      Mental Status: She is alert and oriented to person, place, and time  Psychiatric:         Mood and Affect: Mood normal          Behavior: Behavior normal          Thought Content:  Thought content normal          Judgment: Judgment normal

## 2020-08-28 ENCOUNTER — OFFICE VISIT (OUTPATIENT)
Dept: OBGYN CLINIC | Facility: CLINIC | Age: 85
End: 2020-08-28
Payer: MEDICARE

## 2020-08-28 ENCOUNTER — HOSPITAL ENCOUNTER (OUTPATIENT)
Dept: ULTRASOUND IMAGING | Facility: HOSPITAL | Age: 85
Discharge: HOME | End: 2020-08-28
Attending: OBSTETRICS & GYNECOLOGY
Payer: MEDICARE

## 2020-08-28 DIAGNOSIS — N83.202 LEFT OVARIAN CYST: ICD-10-CM

## 2020-08-28 DIAGNOSIS — N81.4 UTERINE PROLAPSE: Primary | ICD-10-CM

## 2020-08-28 PROCEDURE — 99212 OFFICE O/P EST SF 10 MIN: CPT | Performed by: OBSTETRICS & GYNECOLOGY

## 2020-08-28 PROCEDURE — 76830 TRANSVAGINAL US NON-OB: CPT

## 2020-08-28 PROCEDURE — 76856 US EXAM PELVIC COMPLETE: CPT

## 2020-08-28 NOTE — PROGRESS NOTES
80-year-old female presents to have her pessary removed  Patient has her pelvic ultrasound scheduled today  She will need a vaginal probe ultrasound  Pessary removed with moderate difficulty  Patient did tolerate procedure well, some bleeding was noted at the end of the procedure  Mild abrasion of the vaginal wall  Patient recommended to follow up for repeat placement of pessary next week  All questions answered  Patient left satisfied

## 2020-09-01 ENCOUNTER — TELEPHONE (OUTPATIENT)
Dept: OBGYN CLINIC | Facility: CLINIC | Age: 85
End: 2020-09-01

## 2020-09-01 NOTE — RESULT ENCOUNTER NOTE
No change in left ovarian cyst this is completely stable  Radiology recommends repeating an ultrasound in 1 year  We can discuss this next year  She does have a slightly increased thickness of the endometrium  Recommendation would be an endometrial biopsy if patient is okay with that    If she does not want to have this done in the office we could observe a little longer however this is the recommendation

## 2020-09-01 NOTE — TELEPHONE ENCOUNTER
Marisa Chaudhry from radiology called with significant findings from pelvic ultrasound performed 8/28/20

## 2020-09-09 ENCOUNTER — OFFICE VISIT (OUTPATIENT)
Dept: OBGYN CLINIC | Facility: CLINIC | Age: 85
End: 2020-09-09
Payer: MEDICARE

## 2020-09-09 VITALS
BODY MASS INDEX: 34.79 KG/M2 | DIASTOLIC BLOOD PRESSURE: 76 MMHG | SYSTOLIC BLOOD PRESSURE: 122 MMHG | HEIGHT: 65 IN | WEIGHT: 208.8 LBS

## 2020-09-09 DIAGNOSIS — N81.11 MIDLINE CYSTOCELE: Primary | ICD-10-CM

## 2020-09-09 DIAGNOSIS — N81.6 RECTOCELE: ICD-10-CM

## 2020-09-09 DIAGNOSIS — N81.4 UTERINE PROLAPSE: ICD-10-CM

## 2020-09-09 PROCEDURE — 99213 OFFICE O/P EST LOW 20 MIN: CPT | Performed by: OBSTETRICS & GYNECOLOGY

## 2020-09-09 NOTE — PROGRESS NOTES
Assessment/Plan:  20-year-old female presents after ultrasound for possible endometrial biopsy  I spent 15 minutes with patient discussing her current issue, greater than 50% time was designated towards care plan management  Patient had a 6 mm endometrial stripe on her recent pelvic ultrasound  It had been 4 mm in the past   Her ovarian cyst has been stable without any change  I discussed options with patient  Endometrial biopsy versus conservative management  Patient desires conservative management this time, I agree  We will also leave the pessary out at this time she is asymptomatic with her generalized pelvic relaxation  She will call if she has any vaginal bleeding  She will follow up in approximately 4 months for re-evaluation and possible reinserted in of pessary  Patient understands the slight risk of endometrial hyperplasia or endometrial cancer  She accepts this risk at the age of 80  I examined the patient today as well  No change in cystocele rectocele and uterine prolapse  There is no evidence of any bleeding  Patient has no pain  All questions answered  Patient left satisfied     Diagnoses and all orders for this visit:    Midline cystocele    Rectocele    Uterine prolapse          Subjective:     Patient ID: Storm Montoya is a 80 y o  female  Who presents with a thickened endometrium on recent ultrasound for evaluation and possible endometrial biopsy  Review of Systems   Gastrointestinal: Negative for abdominal distention, abdominal pain and rectal pain  Genitourinary: Positive for vaginal bleeding (Minimal vaginal bleeding) and vaginal discharge ( minimal vaginal discharge)  Negative for difficulty urinating, dysuria, flank pain, frequency, genital sores, hematuria, pelvic pain and vaginal pain  Objective:     Physical Exam  Exam conducted with a chaperone present  Constitutional:       Appearance: She is well-developed     Abdominal:      General: There is no distension  Palpations: Abdomen is soft  There is no mass  Tenderness: There is no abdominal tenderness  There is no guarding or rebound  Hernia: No hernia is present  Genitourinary:     General: Normal vulva  Exam position: Lithotomy position  Vagina: Signs of injury ( second-degree cystocele, first-degree rectocele) present  No vaginal discharge, erythema, tenderness or bleeding  Cervix: No cervical motion tenderness, discharge or friability  Uterus: Deviated (Second-degree prolapse)  Adnexa: Right adnexa normal and left adnexa normal         Right: No mass, tenderness or fullness  Left: No mass, tenderness or fullness  Neurological:      Mental Status: She is alert and oriented to person, place, and time  Psychiatric:         Behavior: Behavior normal          Thought Content:  Thought content normal          Judgment: Judgment normal

## 2020-10-14 ENCOUNTER — TELEPHONE (OUTPATIENT)
Dept: OBGYN CLINIC | Facility: CLINIC | Age: 85
End: 2020-10-14

## 2020-12-10 ENCOUNTER — TELEPHONE (OUTPATIENT)
Dept: OBGYN CLINIC | Facility: CLINIC | Age: 85
End: 2020-12-10

## 2021-03-24 ENCOUNTER — OFFICE VISIT (OUTPATIENT)
Dept: OBGYN CLINIC | Facility: CLINIC | Age: 86
End: 2021-03-24
Payer: MEDICARE

## 2021-03-24 VITALS
SYSTOLIC BLOOD PRESSURE: 130 MMHG | WEIGHT: 208 LBS | BODY MASS INDEX: 34.66 KG/M2 | DIASTOLIC BLOOD PRESSURE: 80 MMHG | HEIGHT: 65 IN

## 2021-03-24 DIAGNOSIS — N81.4 UTERINE PROLAPSE: ICD-10-CM

## 2021-03-24 DIAGNOSIS — R93.89 ENDOMETRIAL THICKENING ON ULTRASOUND: Primary | ICD-10-CM

## 2021-03-24 PROCEDURE — 99214 OFFICE O/P EST MOD 30 MIN: CPT | Performed by: OBSTETRICS & GYNECOLOGY

## 2021-03-24 RX ORDER — AMLODIPINE BESYLATE AND BENAZEPRIL HYDROCHLORIDE 5; 10 MG/1; MG/1
1 CAPSULE ORAL DAILY
COMMUNITY
End: 2022-02-10

## 2021-03-24 NOTE — PROGRESS NOTES
Assessment/Plan:         Diagnoses and all orders for this visit:    Endometrial thickening on ultrasound  -     US pelvis complete non OB; Future    Uterine prolapse    Other orders  -     amLODIPine-benazepril (LOTREL 5-10) 5-10 MG per capsule; Take 1 capsule by mouth daily          Subjective:      Patient ID: Constantino Nicholas is a 80 y o  female  The patient is a  49-year-old postmenopausal female with known cystocele, rectocele uterine prolapse who has had the pessary out for the last several months after it was spontaneously expelled following the last cleaning and replacement  She was upset when this happened and the decision was made to leave it out for while and see how she tolerated the uterine descensus she says she has had occasional episodes of bleeding, the urologist thinks it may be from the bladder rubbing as it comes down  She is not aware of any type of actual pelvic organ prolapse below the introitus, and she denies rubbing, burning, problems moving her bowels or voiding  She feels fine without the pessary and less this is found to be the source of bleeding would prefer not to have replaced at this time  However a recent ultrasound revealed an endometrial thickening with a change in the endometrial stripe from 4 mm to 6 mm  She chose not to pursue that, but I discussed with her that it might be worth getting a repeat ultrasound just to make sure that it has not become much thicker or is the source of the vaginal bleeding she is having right now  On exam no bleeding was noted  We will repeat the ultrasound and then if there is no major changes she will return for re-evaluation and consideration of pessary replacement in 4 months        The following portions of the patient's history were reviewed and updated as appropriate: allergies, current medications, past family history, past medical history, past social history, past surgical history and problem list     Review of Systems Constitutional: Negative for chills, diaphoresis, fatigue, fever and unexpected weight change  HENT: Negative for congestion, sinus pressure, sinus pain, tinnitus and trouble swallowing  Eyes: Negative for visual disturbance  Respiratory: Negative for cough, chest tightness and shortness of breath  Cardiovascular: Negative for chest pain, palpitations and leg swelling  Gastrointestinal: Negative for abdominal distention, abdominal pain, anal bleeding, constipation, diarrhea, nausea, rectal pain and vomiting  Endocrine: Negative for heat intolerance  Genitourinary: Negative for difficulty urinating, dysuria, flank pain, frequency, genital sores, hematuria and urgency  Musculoskeletal: Negative for arthralgias, back pain and joint swelling  Skin: Negative for rash  Allergic/Immunologic: Negative for environmental allergies and food allergies  Neurological: Negative for headaches  Hematological: Negative for adenopathy  Does not bruise/bleed easily  Psychiatric/Behavioral: Negative for decreased concentration and dysphoric mood  The patient is not nervous/anxious  Objective:      /80 (BP Location: Left arm, Patient Position: Sitting, Cuff Size: Standard)   Ht 5' 5" (1 651 m)   Wt 94 3 kg (208 lb)   BMI 34 61 kg/m²          Physical Exam  Vitals signs and nursing note reviewed  Exam conducted with a chaperone present  Constitutional:       General: She is not in acute distress  Appearance: Normal appearance  She is normal weight  She is not ill-appearing  HENT:      Head: Normocephalic  Nose: Nose normal       Mouth/Throat:      Mouth: Mucous membranes are moist       Pharynx: Oropharynx is clear  Eyes:      Conjunctiva/sclera: Conjunctivae normal       Pupils: Pupils are equal, round, and reactive to light  Neck:      Musculoskeletal: Neck supple  Cardiovascular:      Rate and Rhythm: Normal rate and regular rhythm  Pulses: Normal pulses  Pulmonary:      Effort: Pulmonary effort is normal       Breath sounds: Normal breath sounds  Chest:      Breasts: Ethan Score is 5  Right: Normal  No mass, nipple discharge, skin change or tenderness  Left: Normal  No mass, nipple discharge, skin change or tenderness  Abdominal:      General: Abdomen is flat  Bowel sounds are normal       Palpations: Abdomen is soft  Genitourinary:     General: Normal vulva  Exam position: Lithotomy position  Ethan stage (genital): 5  Cervix: Normal       Uterus: Normal  With uterine prolapse  Adnexa: Right adnexa normal and left adnexa normal       Rectum: Normal       Comments: Large cystocele, moderate rectocele, uterine prolapes  Musculoskeletal: Normal range of motion  Lymphadenopathy:      Upper Body:      Right upper body: No axillary adenopathy  Left upper body: No axillary adenopathy  Skin:     General: Skin is warm and dry  Neurological:      General: No focal deficit present  Mental Status: She is alert     Psychiatric:         Mood and Affect: Mood normal

## 2021-03-27 ENCOUNTER — HOSPITAL ENCOUNTER (OUTPATIENT)
Dept: ULTRASOUND IMAGING | Facility: HOSPITAL | Age: 86
Discharge: HOME | End: 2021-03-27
Attending: OBSTETRICS & GYNECOLOGY
Payer: MEDICARE

## 2021-03-27 DIAGNOSIS — R93.89 ENDOMETRIAL THICKENING ON ULTRASOUND: ICD-10-CM

## 2021-03-27 PROCEDURE — 76856 US EXAM PELVIC COMPLETE: CPT

## 2021-07-20 ENCOUNTER — OFFICE VISIT (OUTPATIENT)
Dept: OBGYN CLINIC | Facility: CLINIC | Age: 86
End: 2021-07-20
Payer: MEDICARE

## 2021-07-20 VITALS — HEIGHT: 64 IN | BODY MASS INDEX: 35 KG/M2 | WEIGHT: 205 LBS

## 2021-07-20 DIAGNOSIS — E11.42 DIABETIC PERIPHERAL NEUROPATHY ASSOCIATED WITH TYPE 2 DIABETES MELLITUS (HCC): ICD-10-CM

## 2021-07-20 DIAGNOSIS — N81.4 UTERINE PROLAPSE: Primary | ICD-10-CM

## 2021-07-20 DIAGNOSIS — N95.0 PMB (POSTMENOPAUSAL BLEEDING): ICD-10-CM

## 2021-07-20 DIAGNOSIS — N76.3 CHRONIC VULVITIS: ICD-10-CM

## 2021-07-20 DIAGNOSIS — Z12.4 SCREENING FOR MALIGNANT NEOPLASM OF CERVIX: ICD-10-CM

## 2021-07-20 PROCEDURE — 88342 IMHCHEM/IMCYTCHM 1ST ANTB: CPT | Performed by: PATHOLOGY

## 2021-07-20 PROCEDURE — 88305 TISSUE EXAM BY PATHOLOGIST: CPT | Performed by: PATHOLOGY

## 2021-07-20 PROCEDURE — 99213 OFFICE O/P EST LOW 20 MIN: CPT | Performed by: OBSTETRICS & GYNECOLOGY

## 2021-07-20 PROCEDURE — G0145 SCR C/V CYTO,THINLAYER,RESCR: HCPCS | Performed by: OBSTETRICS & GYNECOLOGY

## 2021-07-20 RX ORDER — CLOTRIMAZOLE AND BETAMETHASONE DIPROPIONATE 10; .64 MG/G; MG/G
CREAM TOPICAL 2 TIMES DAILY
Qty: 30 G | Refills: 0 | Status: SHIPPED | OUTPATIENT
Start: 2021-07-20

## 2021-07-20 NOTE — PROGRESS NOTES
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)          Subjective:      Patient ID: Alex Arriaga is a 80 y o  female  Patient is an 43-year-old  4 para 3 postmenopausal female with longstanding uterine prolapse who had problems in the past with pessary is falling out who is attempting not to have to wear the pessary  She has been having some intermittent very light vaginal spotting since her last visit 4 months ago  She has no symptoms from the prolapse  She denies feeling a bulge at the introitus, pressure, pain, difficulty urinating, or difficulty voiding  She has known bilateral ovarian cyst which are simple in nature and have not grown or changed over what appears to be several years  She also has an endometrium which is 0 6 cm in diameter,  which also appears to be stable  However, approximately 8 months ago it changed from 0 4 cm to 0 6 cm  Dr Reyna Lopez discussed the situation with the patient at the time and she elected not to pursue endometrial biopsy, laparoscopy, or other invasive procedures  Since that time she has developed that the the above described vaginal bleeding which is painless  She does not associated with increased activity or any other factors  She does however admit that she has constant itching in the vulvar area and that sometime she scratches hard enough to open the skin  On examination, prolapsed uterus is slightly above the introitus and does not descend on Valsalva maneuver  The cervix can be visualized at the introitus and is somewhat erythematous with no discrete lesions  A Pap smear and endometrial biopsy were performed today    The cervix is stenotic and did not allow for admission of the endometrial biopsy device  There are some excoriations on the vulva, and it is not outside the realm of possibility that the spotting is associated with the scratching and rubbing which helped stop the itching  She was given a prescription for Lotrisone cream (see aware of prednisone allergy)  Will wait the results of these tests and then discuss them with the patient  The following portions of the patient's history were reviewed and updated as appropriate: allergies, current medications, past family history, past medical history, past social history, past surgical history and problem list     Review of Systems   Constitutional: Negative for activity change and fever  Gastrointestinal: Negative for anal bleeding, blood in stool, constipation and diarrhea  Genitourinary: Positive for vaginal bleeding  Negative for difficulty urinating, frequency, genital sores, pelvic pain, urgency, vaginal discharge and vaginal pain  Musculoskeletal: Negative for arthralgias, back pain and myalgias  Skin: Negative for rash and wound  Neurological: Negative for dizziness  Objective:      Ht 5' 4" (1 626 m)   Wt 93 kg (205 lb)   BMI 35 19 kg/m²          Physical Exam  Vitals and nursing note reviewed  Exam conducted with a chaperone present  Constitutional:       General: She is not in acute distress  Appearance: Normal appearance  She is normal weight  She is not ill-appearing  HENT:      Head: Normocephalic  Nose: Nose normal       Mouth/Throat:      Mouth: Mucous membranes are moist       Pharynx: Oropharynx is clear  Eyes:      Conjunctiva/sclera: Conjunctivae normal       Pupils: Pupils are equal, round, and reactive to light  Cardiovascular:      Rate and Rhythm: Normal rate and regular rhythm  Pulses: Normal pulses  Pulmonary:      Effort: Pulmonary effort is normal       Breath sounds: Normal breath sounds  Chest:      Breasts: Ethan Score is 5           Right: Normal  No mass, nipple discharge, skin change or tenderness  Left: Normal  No mass, nipple discharge, skin change or tenderness  Abdominal:      General: Abdomen is flat  Bowel sounds are normal       Palpations: Abdomen is soft  Genitourinary:     General: Normal vulva  Exam position: Lithotomy position  Ethan stage (genital): 5       Vagina: No signs of injury  Erythema, tenderness, bleeding and prolapsed vaginal walls present  No vaginal discharge or lesions  Cervix: Cervical bleeding present  Uterus: Normal  With uterine prolapse  Adnexa: Right adnexa normal and left adnexa normal       Rectum: Normal    Musculoskeletal:         General: Normal range of motion  Cervical back: Neck supple  Lymphadenopathy:      Upper Body:      Right upper body: No axillary adenopathy  Left upper body: No axillary adenopathy  Skin:     General: Skin is warm and dry  Neurological:      General: No focal deficit present  Mental Status: She is alert     Psychiatric:         Mood and Affect: Mood normal

## 2021-07-29 LAB
LAB AP GYN PRIMARY INTERPRETATION: NORMAL
Lab: NORMAL
PATH INTERP SPEC-IMP: NORMAL

## 2021-09-21 ENCOUNTER — OFFICE VISIT (OUTPATIENT)
Dept: OBGYN CLINIC | Facility: CLINIC | Age: 86
End: 2021-09-21
Payer: MEDICARE

## 2021-09-21 VITALS — BODY MASS INDEX: 35 KG/M2 | WEIGHT: 205 LBS | HEIGHT: 64 IN

## 2021-09-21 DIAGNOSIS — N81.4 UTERINE PROLAPSE: Primary | ICD-10-CM

## 2021-09-21 DIAGNOSIS — R93.89 ENDOMETRIAL THICKENING ON ULTRASOUND: ICD-10-CM

## 2021-09-21 PROCEDURE — 99213 OFFICE O/P EST LOW 20 MIN: CPT | Performed by: OBSTETRICS & GYNECOLOGY

## 2021-09-21 NOTE — PROGRESS NOTES
Assessment/Plan:         There are no diagnoses linked to this encounter  Subjective:      Patient ID: Anjelica Borrero is a 80 y o  female  The patient is an 14-year-old  4 para 4 postmenopausal female with a history of prolapse  She had previously worn a pessary but had difficulty with the  She has had discomfort and increased vaginal discharge and bleeding with the pessary,  The pessary was discontinued approximately 3 months ago  During that time she has no symptoms of pressure or pain  She denies any rubbing or difficulty ambulating  She is happy without the pessary  On physical exam, the pessary does not seem to be necessary as the amount of prolapse is minimal       Her 2nd problem is a history of postmenopausal bleeding several years ago and an increase in the endometrial stripe from 0 4-0 6 over the previous year  On ultrasound today the endometrial stripe is 0 35  She has had no bleeding recently  It is safe to assume the bleeding was from atrophy  Will see her back in 1 year or as needed  The following portions of the patient's history were reviewed and updated as appropriate: allergies, current medications, past family history, past medical history, past social history, past surgical history and problem list     Review of Systems      Objective:      Ht 5' 4" (1 626 m)   Wt 93 kg (205 lb)   BMI 35 19 kg/m²          Physical Exam  Vitals and nursing note reviewed  Exam conducted with a chaperone present  Constitutional:       Appearance: Normal appearance  She is normal weight  HENT:      Head: Normocephalic and atraumatic  Nose: Nose normal    Eyes:      Conjunctiva/sclera: Conjunctivae normal    Pulmonary:      Effort: Pulmonary effort is normal    Abdominal:      General: Abdomen is flat  Palpations: Abdomen is soft  Genitourinary:     General: Normal vulva  Exam position: Lithotomy position        Vagina: Normal       Cervix: Normal       Uterus: Normal        Adnexa: Right adnexa normal and left adnexa normal       Comments: No visible prolapse while supine  Musculoskeletal:         General: Normal range of motion  Skin:     General: Skin is warm and dry  Neurological:      General: No focal deficit present  Mental Status: She is alert  Mental status is at baseline  Psychiatric:         Mood and Affect: Mood normal          Behavior: Behavior normal          Thought Content:  Thought content normal          Judgment: Judgment normal

## 2021-09-22 ENCOUNTER — TELEPHONE (OUTPATIENT)
Dept: GERIATRICS | Age: 86
End: 2021-09-22

## 2021-09-22 NOTE — TELEPHONE ENCOUNTER
[address] [id number] [phone]    Telephone Intake: Geriatric Assessment     Referral source: brother-in-law referred (Robyn Bryant)                                    Caller who is scheduling/relationship to patient: spouseConstantino phone number: [phone] (cell, preferred method of contacting)                Is there a Power of  in place/If so, who? Yes, daughter    Reason for referral: Family member concerns regarding memory concerns  What is the goal of the visit? To find out diagnosis, to help with word finding (patient has difficulty finding right word)     Has the patient been seen by a Neurologist or Geriatrician? No  Has the patient ever been diagnosed with dementia? No      Preferred language? English  Highest education level? HS Grad  Does the patient wear glasses? Yes   Does the patient use hearing aids? No     Does the patient and/or caregiver have access for a virtual visit (computer or smart phone, working audio and video)? Yes     Caller was informed:    Please make sure the pt is accompanied by someone who knows them well / caregiver / family member to participate in this appointment  If a pt plans to attend alone, please route a message to the assigned provider for approval  Who will accompany the patient (name and relationship)? Daughter and spouse   Primary number on file will be called to confirm this appointment  Office packet mailed out to: [address] [place] [phone]    NOTE FOR : If the patient was recently hospitalized, please route intake to assigned provider for chart review

## 2021-09-22 NOTE — LETTER
September 22, 2021    Dear Zane Murguia,    Thank you for choosing [address] [facility]  We assist in the needs of older adults and their families  Our goal is to help aging adults in maintaining a healthy, safe and independent lifestyle and to work with their primary care physician to coordinate care  There are many reasons appointments are scheduled with us  Some patients are seeking a baseline assessment as part of medical history; other patients might have a specific concern regarding cognition, multiple medications, or overall health concerns  What to Expect  Two, possibly, three visits:     Initial Visit is approximately one hour  The patient and representative/family meet with the doctor, possibly a resident/fellow, and the   In addition to medical issues, patient goals, quality of life, home safety, and greatest areas of concern are addressed  If necessary, additional testing may be ordered such as bloodwork, imaging, and/or a computerized cognitive evaluation  A physical therapy/occupation therapy evaluation may also be ordered  Conference visit is approximately one hour  The patient and representative/family review an individualized care plan targeting patient's goals/concerns/issues and are provided with recommendations and resources  [facility]  is a teaching institution and there will be residents and fellows as a part of your visits with us       Please bring the following to your initial appointment:      A mask without a valve (to be worn by everyone entering the building)    Eye glasses and/or hearing aids (as applicable)    Enclosed forms (please complete and bring to appointment)    Advance directives (a living will and/or durable power of ) if established    Medication list (including vitamins or supplements you are currently taking)       DATE of INITIAL VISIT: Friday, February 11, 2022 at 11:00 AM    Please arrive at least 15 minutes before scheduled appointment time  When you park, please call our office at [phone] to let us know you have arrived  We will conduct check-in by telephone  One person may accompany you to your appointment  DATE of CARE CONFERENCE: Friday, March 18, 2022 at 11:00 AM (virtual appointment)    For Video Conference: Our office will call 20-30 minutes prior to appointment complete check in, review medications and allergies, as well as ask for vitals  If you can take the patient's blood pressure, pulse, temperature, and weight on the day of this appointment, it would be appreciated  For In-office Conference: One person may accompany the patient to appointment  Others are welcome to join by phone or video  Please let us know which you prefer so we can provide a conference phone number or a video link  PLEASE NOTE: Due to the extensive and comprehensive nature of the visit, if you are more than 15 minutes late we will need to reschedule the appointment  Thank you again for choosing [address] [place] [address]  We look forward to meeting you!

## 2022-01-05 ENCOUNTER — CONSULT (OUTPATIENT)
Dept: GERIATRICS | Age: 87
End: 2022-01-05
Payer: MEDICARE

## 2022-01-05 VITALS
WEIGHT: 203 LBS | SYSTOLIC BLOOD PRESSURE: 134 MMHG | DIASTOLIC BLOOD PRESSURE: 62 MMHG | OXYGEN SATURATION: 100 % | HEART RATE: 88 BPM | TEMPERATURE: 97.9 F | RESPIRATION RATE: 18 BRPM | HEIGHT: 64 IN | BODY MASS INDEX: 34.66 KG/M2

## 2022-01-05 DIAGNOSIS — I48.91 ATRIAL FIBRILLATION, UNSPECIFIED TYPE (HCC): ICD-10-CM

## 2022-01-05 DIAGNOSIS — E78.49 OTHER HYPERLIPIDEMIA: ICD-10-CM

## 2022-01-05 DIAGNOSIS — G31.84 MCI (MILD COGNITIVE IMPAIRMENT): Primary | ICD-10-CM

## 2022-01-05 DIAGNOSIS — N18.32 STAGE 3B CHRONIC KIDNEY DISEASE (HCC): ICD-10-CM

## 2022-01-05 DIAGNOSIS — I10 PRIMARY HYPERTENSION: ICD-10-CM

## 2022-01-05 DIAGNOSIS — H40.9 GLAUCOMA, UNSPECIFIED GLAUCOMA TYPE, UNSPECIFIED LATERALITY: ICD-10-CM

## 2022-01-05 DIAGNOSIS — F41.9 ANXIETY: ICD-10-CM

## 2022-01-05 DIAGNOSIS — Z79.899 ENCOUNTER FOR MEDICATION REVIEW: ICD-10-CM

## 2022-01-05 DIAGNOSIS — R26.81 GAIT INSTABILITY: ICD-10-CM

## 2022-01-05 PROBLEM — N18.30 CKD (CHRONIC KIDNEY DISEASE) STAGE 3, GFR 30-59 ML/MIN (HCC): Status: ACTIVE | Noted: 2022-01-05

## 2022-01-05 PROCEDURE — 99205 OFFICE O/P NEW HI 60 MIN: CPT | Performed by: STUDENT IN AN ORGANIZED HEALTH CARE EDUCATION/TRAINING PROGRAM

## 2022-01-05 NOTE — PROGRESS NOTES
Assessment & Plan:   José Antonio Livingston was seen today for geriatric evaluation  Diagnoses and all orders for this visit:    MCI (mild cognitive impairment)  -     MRI brain NeuroQuant wo contrast; Future  -     MindStreams Assessment;  Future    Atrial fibrillation, unspecified type (HCC)    Stage 3b chronic kidney disease (HCC)    Gait instability    Glaucoma, unspecified glaucoma type, unspecified laterality    Primary hypertension    Other hyperlipidemia    Encounter for medication review    Anxiety      MCI (mild cognitive impairment)  MOCA 23/30, GDS 2/15, TUGT 16   Patient with deficits in visual spatial, executive function, language, abstraction, memory and delayed recall domains  History, physical exam and above neurocognitive screening, 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  Reviewed prior blood work  Will refer for neurotrax testing  Will order MRI neuro quant of the brain  Will refer to speech therapy for cognitive rehabilitation  Consider referral to matter of balance given patient's fear of falling which has compromised her mobility  Encourage enrolling into a senior center for positive socialization  Consider cognitively stimulating apps such as [employer], brain Hq  Recommend Mediterranean diet which has shown to decrease risk of memory loss and CVA  Reorientation redirection as needed  Manage chronic conditions  Maintain Falls precautions  Encourage patient to remain active mentally, physically and socially  Participate in cognitively challenging exercises as able  Consider blister packaging for ease of medication administration  Consider a falls Alert device as a safety precaution      Glaucoma  Patient continues on Alphagan and latanoprost eyedrops  Maintain Falls precautions  Follow-up with Ophthalmology for continued monitoring    Other hyperlipidemia  Patient continues on pravastatin 40 mg daily  Follow-up with PCP for continued monitoring  Exercise as able  Recommend adherence to a heart healthy diet    Primary hypertension  /62  Patient continues on quinapril  Follow-up with PCP for continued monitoring    Atrial fibrillation [facility])  Patient denies any cardiorespiratory distress, heart rate remains controlled  Patient currently on anticoagulation with Eliquis  Recommend adherence to a heart healthy diet  Follow-up with Cardiology as scheduled    CKD (chronic kidney disease) stage 3, GFR 30-59 ml/min ([facility])  Avoid nephrotoxic agents  Medications to be renally dosed  Follow-up with PCP for continued monitoring    Gait instability  TUGT 16 sec  Patient has had prior courses of physical therapy  Will consider enrolling into a matter of balance class given patient's fear of falling  Maintain Falls precautions  Consider a falls Alert device    Encounter for medication review  Patient has been prescribed Xanax for anxiety  Reviewed side effects of chronic benzodiazepine use which may contribute to memory loss, amnesia, confusion, dizziness, dysarthria, impaired coordination and fatigue  Will consider alternative pharmacotherapy options at care plan conference    Anxiety  Patient currently maintained on Xanax prescribed by PCP  Reviewed side effects of chronic benzodiazepine use in elderly patients   Will consider transitioning to an SSRI and will discuss at care plan conf          HPI:  We had the pleasure of evaluating Katina Vu who is a 80 y o  female in Geriatric consultation today  She lives with   Ms Patrick Most is in the office with her daughter      Memory Issues noticed since 2 years  Memory affected: short term memory loss    Symptoms started: gradual  Over time the memory has:  worsened  Memory issue(s) were noted by: family       She has problems operating household appliance such as TV remote, kitchen appliances, computer       This is an 43-year-old female with HTN, atrial fibrillation, bilateral lower extremity edema, HLD, glaucoma gait instability who presents for her initial comprehensive geriatric assessment  The patient currently resides with her  of 77 years in a two-jocy home and has 4 children  She completed high school after which she worked at [employer] retiring 25 years ago  No known family history of cognitive deficits  Daughter explains that the patient has been having difficulty with her speech  She describes the patient as knowing what she would like to say however has difficulty with word finding and expressing her ideas  The patient has also become somewhat forgetful as daughter describes the patient not realizing who  Her grandkids were  The patient did have a total knee replacement over 10 years ago  Family describes the patient as fearful of walking since then as she is concerned about falling  She has had physical therapy up to 8 months ago where she does participate in activities whilst she is enrolled in therapy  However once therapy is completed, she becomes very sedentary and immobile  Daughter explains that the patient remains sitting on the couch all day long looking at television  Despite her  participating in physical and social activities during the daytime, the patient refuses to do so as this would involve ambulation  She does participate in a travel club however her  and daughter has been assisting with this  The patient remains independent in all ADLs but dependent in IADLs  Daughter also became concerned about the patient's cognition after the patient has signed a birthday card "love mom and dad T"  Daughter relays that the patient believes that her family feels she is going crazy  She previously did manage the finances however did miss entries in her checkbooks  As a result of which  does assist with finances now and with supervising what she completes  No safety concerns at home including the stove or burning food    The patient has not driven for several years as a result of which this is not a concern  She does take her own medications without any concerns and does have difficulty sleeping throughout the night due to urinary frequency and urgency  The patient explains that she sometimes does have difficulty falling asleep again after waking up to urinate  Daughter describes the patient as crying very frequently during the day as she states that she has become boredd and is worried  She will become upset when her  leaves her at home however she refuses to participate in any socializing or physical activity  No cardiorespiratory distress, fever, chills, URI, urinary symptoms or recent falls  The patient continues to tolerate oral intake and has been having regular bowel movements  She has difficulty finding the right word while speaking: Yes  Patient requires repeat information or ask the same question repeatedly: Yes  Do you drive: No       Have you had any recent accidents, citations or getting lost in familiar places :No  Do you handle your own financial affairs such as balancing your checkbook, paying bills, investments: Yes  Do have any difficulties with handling your financial affairs: Yes  Have you or your family noted any change in your mood or personality:Yes  Are you currently or have you been treated in the past for depression or anxiety: Yes  Have you noticed any gait or balance disorder: Yes  Uses :Walker: rollator  Any hallucination or delusion: No  Fluctuation in alertness: No  Sleep Issues: Yes  Urinary/Stool Incontinence: Yes  Hearing and vision issue: No  Do you have POA:Yes  Do you have a Living will Yes  Past Medical, surgical, social, medication and allergy history and patients previous records reviewed  Family Review of Behavior [facility]:    pacing  No    agressive/combative behavior  No    agitated  Yes   wandering  No   resistance to care  No   hoarding/hiding objects  No    suspicious  No  withdrawn :yes  inappropriate sexual behaviorNo  rummaging/pillaging  No    misplacing/losing objects No  personal hygiene problems  No  forgetfulness of actions Yes   temper outbursts  Yes     throwing items No      Family member with dementia and what type? no  Have you had any head trauma No  Does patient have history of alcohol abuse No      ROS: Review of Systems   Constitutional: Positive for activity change (chronic) and fatigue (chronic)  Negative for chills and fever  HENT: Negative for congestion, ear pain, rhinorrhea and sore throat  Eyes: Negative for discharge  Respiratory: Negative for cough, chest tightness, shortness of breath, wheezing and stridor  Cardiovascular: Negative for chest pain, palpitations and leg swelling  Gastrointestinal: Negative for abdominal pain, constipation, diarrhea, nausea and vomiting  Genitourinary: Positive for frequency (chronic) and urgency (chronic)  Negative for dysuria  Musculoskeletal: Positive for gait problem  Skin: Negative for color change, pallor, rash and wound  Neurological: Positive for speech difficulty  Negative for dizziness  Psychiatric/Behavioral: Positive for decreased concentration, dysphoric mood and sleep disturbance  Allergies:    Allergies   Allergen Reactions    Erythromycin GI Intolerance     "stomach burning", bruises    Minocycline GI Intolerance     "stomach burning", bruises    Penicillins GI Intolerance     "burning stomach", bruising    Propoxyphene GI Intolerance    Tetracycline GI Intolerance     "stomach burning"-bruising    Vicodin [Hydrocodone-Acetaminophen] GI Intolerance     "stomach burning" ,nausea    Prednisone     Avelox [Moxifloxacin] GI Intolerance     Avoids d/t N/V    Percocet [Oxycodone-Acetaminophen] GI Intolerance     Avoids d/t N/V    Sulfa Antibiotics Rash       Medications:      Current Outpatient Medications:     Acetaminophen 325 MG CAPS, Take 650 mg by mouth every 6 (six) hours, Disp: , Rfl:     ALPRAZolam (XANAX) 0 5 mg tablet, Take by mouth 2 (two) times a day, Disp: , Rfl:     amLODIPine-benazepril (LOTREL 5-10) 5-10 MG per capsule, Take 1 capsule by mouth daily, Disp: , Rfl:     apixaban (Eliquis) 5 mg, Take 5 mg by mouth 2 (two) times a day, Disp: , Rfl:     ascorbic acid (VITAMIN C) 500 mg tablet, Take 500 mg by mouth every morning, Disp: , Rfl:     brimonidine (ALPHAGAN P) 0 1 %, 1 drop 2 (two) times a day Each eye, Disp: , Rfl:     Calcium Citrate (CITRACAL PO), Take by mouth every morning, Disp: , Rfl:     CLINDAMYCIN HCL PO, Take 600 mg by mouth as needed Prior to invasive procedures  , Disp: , Rfl:     multivitamin (THERAGRAN) TABS, Take 1 tablet by mouth every morning, Disp: , Rfl:     pravastatin (PRAVACHOL) 40 mg tablet, Take 40 mg by mouth daily at bedtime, Disp: , Rfl:     Propylene Glycol (SYSTANE BALANCE) 0 6 % SOLN, Systane Balance 0 6 % eye drops, Disp: , Rfl:     quinapril (ACCUPRIL) 10 mg tablet, Take 10 mg by mouth every morning, Disp: , Rfl:     rivaroxaban (Xarelto) 15 mg tablet, Take 15 mg by mouth daily with dinner  , Disp: , Rfl:     apixaban (ELIQUIS) 5 mg, Take 5 mg by mouth 2 (two) times a day, Disp: , Rfl:     clotrimazole-betamethasone (LOTRISONE) 1-0 05 % cream, Apply topically 2 (two) times a day (Patient not taking: Reported on 9/21/2021), Disp: 30 g, Rfl: 0    latanoprost (XALATAN) 0 005 % ophthalmic solution, Administer 1 drop to both eyes daily at bedtime (Patient not taking: Reported on 9/21/2021), Disp: , Rfl:     Mirabegron ER 50 MG TB24, Take 1 tablet (50 mg total) by mouth daily at bedtime (Patient not taking: Reported on 9/21/2021), Disp: 30 tablet, Rfl: 9    Multiple Vitamins-Minerals (PRESERVISION AREDS 2+MULTI VIT PO), Take 2 tablets by mouth daily (Patient not taking: Reported on 9/21/2021), Disp: , Rfl:     omeprazole (PriLOSEC) 40 MG capsule, Take 40 mg by mouth daily at bedtime (Patient not taking: Reported on 9/21/2021), Disp: , Rfl:     Polyethyl Glycol-Propyl Glycol (SYSTANE OP), Apply to eye 4 (four) times a day Each eye (Patient not taking: Reported on 9/21/2021), Disp: , Rfl:     Vitals:  Vitals:    01/05/22 0906   BP: 134/62   Pulse: 88   Resp: 18   Temp: 97 9 °F (36 6 °C)   SpO2: 100%       History:  Past Medical History:   Diagnosis Date    Arthritis     Borderline diabetes     watches diet    Diabetes mellitus ([address] )     Dry eye syndrome, bilateral     Full dentures     Gastric ulcer     dx in 9/2017    Glaucoma     had laser    History of mammogram 2018    Hyperlipidemia     controlled    Hypertension     controlled    Irregular heart beat 09/2017    "pre-mature" beat-saw cardiologist-12/17-Holter monitor 1/18    Ovarian cyst 07/06/2018    Papanicolaou smear for cervical cancer screening 06/05/2015    Neg     PONV (postoperative nausea and vomiting)     Presence of intracervical pessary     Wears glasses      Past Surgical History:   Procedure Laterality Date    APPENDECTOMY      age 6    COLONOSCOPY  09/13/2017    DILATION AND CURETTAGE OF UTERUS      EGD  01/22/2018    Ulcers healed    EGD AND COLONOSCOPY      ESOPHAGOGASTRIC FUNDOPLASTY  09/13/2017    JOINT REPLACEMENT Bilateral     knees    ROTATOR CUFF REPAIR Left     titanium screw    TONSILLECTOMY       Family History   Problem Relation Age of Onset    Cancer Mother         breast    Melanoma Mother         Malignant, internal melanoma     Heart disease Brother         CHF     Social History     Socioeconomic History    Marital status: /Civil Union     Spouse name: Not on file    Number of children: Not on file    Years of education: Not on file    Highest education level: Not on file   Occupational History    Not on file   Tobacco Use    Smoking status: Never Smoker    Smokeless tobacco: Never Used   Substance and Sexual Activity    Alcohol use: Yes     Comment: occ    Drug use: No    Sexual activity: Not Currently     Partners: Male   Other Topics Concern    Not on file   Social History Narrative    No alcohol intake - As per [place]     Caffeine intake: None    Seat belts used routinely     Advance directive: No     Social Determinants of Health     Financial Resource Strain: Not on file   Food Insecurity: Not on file   Transportation Needs: Not on file   Physical Activity: Not on file   Stress: Not on file   Social Connections: Not on file   Intimate Partner Violence: Not on file   Housing Stability: Not on file     Past Surgical History:   Procedure Laterality Date    APPENDECTOMY      age 6    COLONOSCOPY  09/13/2017    DILATION AND CURETTAGE OF UTERUS      EGD  01/22/2018    Ulcers healed    EGD AND COLONOSCOPY      ESOPHAGOGASTRIC FUNDOPLASTY  09/13/2017    JOINT REPLACEMENT Bilateral     knees    ROTATOR CUFF REPAIR Left     titanium screw    TONSILLECTOMY           Physical Exam:   Physical Exam  Vitals reviewed  Constitutional:       General: She is not in acute distress  Appearance: Normal appearance  She is well-developed  She is not ill-appearing or diaphoretic  HENT:      Head: Normocephalic and atraumatic  Right Ear: Tympanic membrane, ear canal and external ear normal       Left Ear: Tympanic membrane, ear canal and external ear normal       Nose: Nose normal       Mouth/Throat:      Mouth: Mucous membranes are moist       Pharynx: Oropharynx is clear  No oropharyngeal exudate  Eyes:      General: No scleral icterus  Right eye: No discharge  Left eye: No discharge  Conjunctiva/sclera: Conjunctivae normal    Neck:      Vascular: No JVD  Cardiovascular:      Rate and Rhythm: Normal rate and regular rhythm  Heart sounds: Murmur heard  No friction rub  No gallop  Pulmonary:      Effort: Pulmonary effort is normal  No respiratory distress  Breath sounds: Normal breath sounds  No wheezing or rales  Abdominal:      General: Bowel sounds are normal  There is no distension        Palpations: Abdomen is soft  Tenderness: There is no abdominal tenderness  There is no guarding  Musculoskeletal:         General: No swelling, tenderness or deformity  Normal range of motion  Cervical back: Normal range of motion and neck supple  Skin:     General: Skin is warm  Coloration: Skin is not pale  Findings: No erythema or rash  Neurological:      General: No focal deficit present  Mental Status: She is alert and oriented to person, place, and time  Mental status is at baseline  Cranial Nerves: No cranial nerve deficit  Gait: Gait abnormal       Deep Tendon Reflexes: Reflexes are normal and symmetric     Psychiatric:         Mood and Affect: Mood normal

## 2022-01-05 NOTE — PROGRESS NOTES
[facility]  [address] [facility], [address] [place] [address], [address] [place] [address]  [phone]    Social Work [facility] Kathy presents today for a geriatric assessment, accompanied by her  Octavio Babcock and daughter Vonda Garsia  Caregiver main concern(s): Daughter reports that her concerns for Cleve Mullins are mostly surrounding her speech and memory issues  For example, she reports that her sister mentioned an instance during which Cleve Mullins could not remember her great grandson or his family  She has difficulty completing sentences and word-finding  She also reports Cleve Mullins has been withdrawn and depressed, though this has been the case historically and is not necessarily new  Since Joyce's knee replacements, she has not been very physically active  She did have physical therapy which went well, however, after the course is completed she will not continue the exercises  Griselda notes Joyce's memory has worsened over the last two years or so, most significantly within the last one year  SOCIAL HISTORY  Patient: Trae Chandler  Current Living Situation: Cleve Mullins lives in a residential home with her     FAMILY BACKGROUND  Marital status:                                        Spouse's Name: Octavio Babcock   Does patient have children? Yes How Many? 4    Where do the children live? 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    [facility]  Are any relationships causing problems right now: No    [employer]: Cleve Mullins was previously must more "on it" and "outgoing " She has become withdrawn over the years  She used to enjoy going to the [employer], however, now declines going when it is brought up  She complains of being bored   Her daughter has suggested purchasing a lift recliner for ease of standing/sitting and being more active day-to-day, though this has also been declined  Mirela Funes and her  do attend [employer] on a regular basis  They also previously went out to dinners  She does belong to [address], which her  and daughter both assist her with  Major life events in past two years (ex: senior care, death in family, major illness etc ): None reported  Does the patient currently drive: No    If not, date stopped driving: Many years ago    [address] which have helped with shopping, meals, bathing, etc : None reported    [facility]: No        Benefits (if applicable): N/A    FINANCIAL  Meet current needs? Yes   Funds available for home care? Yes - likely have a long-term care insurance policy in place, per [facility] available for nursing home? Plan to age in place as long as safely possible  Do you rent or own your home? Own    LEGAL  Advanced directives: POA and living will have both been established  SAFETY ASSESSMENT     Mirela Funes lives in a two-story home  Her bedroom, bathroom, and laundry are all on the first level  FIRE HAZARDS  Does the residence have smoke alarm? Yes     Does the residence have a fire escape? Yes   Are any of the following present in the residence? Frayed Wires       No   Clutter        No   Incorrect use of open flame     Yes -  no concerns    FALL HAZARDS  Do any of the following exist in the residence? Poor lighting       No   Loose Rugs       No   Obstacles       No   Uneven floors       No   Slippery floors       No   Unsafe stairs       No  Does the patient use a fall alert? Yes - unsure if she wears one or not    HEALTH HAZARDS   Does the residence have any of the following? Adequate plumbing / heat / ventilation   Yes   Are there signs of neglect such as the following?    Unkempt house      No   Old food in refrigerator     No   Infestation       No    EMERGENCY HEALTH PLAN   Is there a phone that is accessible to the patient or caregiver? Yes   Is the number to police, physician, and 911 easily accessible? Unsure  Would the patient be able to call 911 in an emergency? Yes     ENVIRONMENT APPROPRIATENESS  Please note if each is available and accessible to the patient:   Zahra Aldridge / Beatriz Delgado / Albert Canchola / Living Room   Yes     Is the patient able to climb stairs if necessary? Yes - with difficulty  Does the patient use any assistant devices for ambulation? Yes    If yes, please list which device required   Walker used at home and while out - have one for the car    Does the bathroom have any of the following? Handrails in tub or toilet     Yes    Raised toilet seat      Yes    Rubber tub mat      Yes    Hot water       Yes     Can the patient independently do the following? Shower       Yes    Toilet        Yes - has a commode in the bedroom just in case   Dress them selves      Yes     Pick appropriate clothing     Yes    Eat        Yes    Drink        Yes     OTHER  Are there any weapons in the home? No  Is there a system in place for medication management? Alena Reyes places her medications out on her own and manages independently  Family has not noticed any medication errors       Des Cognitive Assessment (MoCA) Version 8 1  Education: High School    Points Earned POSSIBLE Points   Visuospatial/Executive   Alternating Inez Making 0 1   Visuoconstructional skills 0 1   Visuoconstructional skills (clock) 3 3   Naming   Naming Animals 3 3   Attention   Digit Span 2 2   Vigilance (letters) 1 1   Serial 7 subtraction 3 3   Language   Sentence Repetition 0 2   Verbal fluency 0 1   Abstraction   Abstraction (word pairings) 1 2   Delayed recall   Delayed recall 3 5   Memory index score: 12/15   Orientation   Orientation 6 6   TOTAL SCORE: 23/30  (Normal ?26/30)   Additional notes:        Geriatric Depression Scale (GDS) completed today, 2/15

## 2022-01-06 ENCOUNTER — TELEPHONE (OUTPATIENT)
Dept: CARDIOLOGY CLINIC | Facility: CLINIC | Age: 87
End: 2022-01-06

## 2022-01-06 PROBLEM — Z79.899 ENCOUNTER FOR MEDICATION REVIEW: Status: ACTIVE | Noted: 2022-01-06

## 2022-01-06 PROBLEM — F41.9 ANXIETY: Status: ACTIVE | Noted: 2022-01-06

## 2022-01-06 NOTE — ASSESSMENT & PLAN NOTE
Patient continues on Alphagan and latanoprost eyedrops  Maintain Falls precautions  Follow-up with Ophthalmology for continued monitoring

## 2022-01-06 NOTE — ASSESSMENT & PLAN NOTE
Patient currently maintained on Xanax prescribed by PCP  Reviewed side effects of chronic benzodiazepine use in elderly patients   Will consider transitioning to an SSRI and will discuss at care plan conf

## 2022-01-06 NOTE — ASSESSMENT & PLAN NOTE
Avoid nephrotoxic agents  Medications to be renally dosed  Follow-up with PCP for continued monitoring

## 2022-01-06 NOTE — ASSESSMENT & PLAN NOTE
Patient has been prescribed Xanax for anxiety  Reviewed side effects of chronic benzodiazepine use which may contribute to memory loss, amnesia, confusion, dizziness, dysarthria, impaired coordination and fatigue  Will consider alternative pharmacotherapy options at care plan conference

## 2022-01-06 NOTE — ASSESSMENT & PLAN NOTE
Patient denies any cardiorespiratory distress, heart rate remains controlled  Patient currently on anticoagulation with Eliquis  Recommend adherence to a heart healthy diet  Follow-up with Cardiology as scheduled

## 2022-01-06 NOTE — ASSESSMENT & PLAN NOTE
TUGT 16 sec  Patient has had prior courses of physical therapy  Will consider enrolling into a matter of balance class given patient's fear of falling  Maintain Falls precautions  Consider a falls Alert device

## 2022-01-06 NOTE — TELEPHONE ENCOUNTER
Pt's spouse came into the office and requested blood work results to be sent for the pt to their PCP - Dr Fausto Redman

## 2022-01-06 NOTE — ASSESSMENT & PLAN NOTE
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  Reviewed prior blood work  Will refer for neurotrax testing  Will order MRI neuro quant of the brain  Will refer to speech therapy for cognitive rehabilitation  Consider referral to matter of balance given patient's fear of falling which has compromised her mobility  Encourage enrolling into a senior center for positive socialization  Consider cognitively stimulating apps such as "[employer] brain Hq  Recommend Mediterranean diet which has shown to decrease risk of memory loss and CVA  Reorientation redirection as needed  Manage chronic conditions  Maintain Falls precautions  Encourage patient to remain active mentally, physically and socially  Participate in cognitively challenging exercises as able  Consider blister packaging for ease of medication administration  Consider a falls Alert device as a safety precaution

## 2022-01-06 NOTE — ASSESSMENT & PLAN NOTE
Patient continues on pravastatin 40 mg daily  Follow-up with PCP for continued monitoring  Exercise as able  Recommend adherence to a heart healthy diet

## 2022-01-25 ENCOUNTER — OFFICE VISIT (OUTPATIENT)
Dept: GERIATRICS | Age: 87
End: 2022-01-25
Payer: MEDICARE

## 2022-01-25 DIAGNOSIS — R41.3 MEMORY LOSS: ICD-10-CM

## 2022-01-25 DIAGNOSIS — G31.84 MCI (MILD COGNITIVE IMPAIRMENT): ICD-10-CM

## 2022-01-25 PROCEDURE — 96139 PSYCL/NRPSYC TST TECH EA: CPT | Performed by: STUDENT IN AN ORGANIZED HEALTH CARE EDUCATION/TRAINING PROGRAM

## 2022-01-25 PROCEDURE — 96138 PSYCL/NRPSYC TECH 1ST: CPT | Performed by: STUDENT IN AN ORGANIZED HEALTH CARE EDUCATION/TRAINING PROGRAM

## 2022-01-25 NOTE — PROGRESS NOTES
[address]  [phone]    Allyssa Garibay was here today for Mindstream comprehensive test  It took the patient 90 minutes to complete

## 2022-02-03 ENCOUNTER — HOSPITAL ENCOUNTER (OUTPATIENT)
Dept: MRI IMAGING | Facility: HOSPITAL | Age: 87
Discharge: HOME | End: 2022-02-03
Payer: MEDICARE

## 2022-02-03 DIAGNOSIS — G31.84 MCI (MILD COGNITIVE IMPAIRMENT): ICD-10-CM

## 2022-02-03 PROCEDURE — G1004 CDSM NDSC: HCPCS

## 2022-02-03 PROCEDURE — 70551 MRI BRAIN STEM W/O DYE: CPT

## 2022-02-08 NOTE — PROGRESS NOTES
[facility]  [address] [facility],[address]  [place], [address] [facility] [place]  [phone]    Care Conference    NAME: Tayler Fitzgerald  AGE: 80 y o  SEX: female  YOB: 1935  DATE OF ASSESSMENT: 1/5/2022  DATE OF CONFERENCE: 2/9/2022  Family Present: Marce Pinto (spouse), Tracee Atkins (daughter), Miko Cough (son) and Bridgett (daughter) via phone  Staff Present: Sandor Field MD    Patient / Family Goals of Care: Review cognitive decline and associated symptoms, discuss treatment options and care planning  Medical Concerns (Current/Historical): Hyperlipidemia, primary hypertension, atrial fibrillation, chronic kidney disease stage 3, anxiety    Geriatric Syndromes/Age Related Syndromes: Mild dementia, glaucoma, gait instability    Neuropsychological   Mild dementia  o [place] Cognitive Assessment: 23/30  o Geriatric Depression Screen: 2/15  o NeuroTrax:   - 83 2 (global cognitive score)  - 76 1 (memory)  - 81 5 (executive function)  - 64 5 (attention)  - 80 5 (visual spatial)     Remain active physically, mentally and socially   MRI showing a loculated cyst with recommendations for repeat MRI with contrast   Given patient's CKD 3 B, family concerned about renal function with contrast   Will refer to nephrology for their recommendations prior to contrast use   Pharmaceutical and non-pharmaceutical interventions discussed    Will plan to wean benzodiazepine therapy in the future after completion   Recommend Mediterranean diet, shown to decrease risk of memory loss and CVA   Engage in cognitively challenging exercises as able   Consider use of apps such as [employer], [employer], [employer] for cognitively stimulating online games   Referral to speech therapy for cognitive rehabilitation   Consider referral to matter of balance given fear of falling which has compromised mobility   Will refer to physical therapy for gait training, balance and strengthening   Discussed with patient about remaining active once cognitive and physical therapies are completed   Currently patient is appropriate to remain at home  However if any safety concerns in the future would consider increasing supervision with home health aides if needed   Encourage enrolling into a senior center for positive socialization and physical activity   Maintain chronic conditions under control   Repeat cognitive assessment in 6 months    Social / Safety Concerns   Consider a [address] for positive socialization, physical exercise, cognitive stimulation*   Consider assistance for medication administration such as blister packaging or use of an automated pill dispenser   Recommend review of fall risk prevention tips (Home Safety Checklist); recommend use of fall alert device    Long Term Care Issues   Maintain updated advance directives and provide a copy to your primary care provider   Consider caregiver support groups and educational resources through the Alzheimer's Association; access Alzheimer's Association 24/7 Helpline at [address] reorientation and redirection as needed (dependent on situation)  [employer] information provided   Recommended literature: 36 Hour Day    Diagnostic Studies   Review of bloodwork   Review of MRI NeuroQuant (2/3/2022), impression:  o Incidental large (6 3x4 7 cm) multiloculated cystic lesion centered in the ventricular septum, eccentric to the right, demonstrating CSH signal intensity  Recommend further characterization with contrast-enhanced MRI, if this is not previously characterized, No prior outside imaging is available in PACS    o Mildly prominent ventricular system is most likely related to peripheral parenchymal atrophy  No hydrocephalus  o NeuroQuant analysis was performed: Normal study; Does not support mesial temporal lobe focused neurodegeneration       Physical Finding Impacting Function   Timed Up and Go Test: 16 seconds (Fall risk assessment:moderate)   Activities of Daily Living: Independent   Instrumental Activities of Daily Living: Dependent   Gait instability     Consider enrolling into a matter of balance class given fear of falling   Will refer to [facility] physical therapy gait training, balance and strengthening   Encourage appropriate footwear at all times   Review fall risk prevention tips and adjust within the home environment as needed    Medications Reviewed    Reviewed side effects of chronic benzodiazepine use which may contribute to memory loss, amnesia, confusion, dizziness, dysarthria, impaired coordination and fatigue  o Consider alternative pharmacotherapy options at care conference   Will address at follow-up visit as patient will need repeat MRI, Nephrology consult   Check with PCP before using over the counter medications   Avoid over the counter medications that can affect cognition (e g , Benadryl, Tylenol PM)    Avoid NSAIDs due to risk of GI bleed and renal impairment    Other Findings   BMI: 34 84 kg/m2   Maintain well-balanced diet   Continue following with primary care physician regularly  Glaucoma   Continue on alphagen and latanoprost eyedrops   Maintain falls precautions   Follow up with Ophthalmology for continued monitoring  Hyperlipidemia   Continue on pravastatin 40 mg daily   Recommend adherence to a heart healthy diet and exercise as able   Follow-up with PCP for continued monitoring  Primary hypertension   Continue on quinapril; follow-up with PCP for continued monitoring  Atrial fibrillation   Currently on anticoagulation with Eliquis   Recommend adherence to a heart healthy diet; follow-up with cardiology as scheduled  Chronic kidney disease stage 3   Avoid nephrotoxic agents, medications to be renally dosed   Follow-up with PCP for continued monitoring  Anxiety   Currently maintained on Xanax prescribed by PCP; side effects of chronic benzodiazepine use in elderly patients reviewed   Consider transitioning to an SSRI in the future at follow up visit  CKD3b   Avoid nephrotoxic agents, medications to be renally dosed  Brain Cyst  MRI showing incidental large (6 3x4 7 cm) multiloculated cystic lesion  Will plan to repeat MRI with contrast, consider neurosurgery referral pending repeat scan  Will refer to nephrology for advise prior to contrast administration given family's concern about her CKD3b    Vitamin B12 deficiency  Continue vitamin B12 supplementation    Recommended Health Maintenance   Immunizations, if not contraindicated:    Influenza vaccine yearly   Pneumo vaccine every 5 years (65 years and over)   Shingles vaccine   COVID-19 vaccine    *Based on current recommendations by the CDC due to COVID-19, please maintain social distancing at this time  Patient and family verbalized understanding of above care plan  With any questions, please contact our office at [phone]

## 2022-02-09 ENCOUNTER — TELEMEDICINE (OUTPATIENT)
Dept: GERIATRICS | Age: 87
End: 2022-02-09
Payer: MEDICARE

## 2022-02-09 ENCOUNTER — TELEPHONE (OUTPATIENT)
Dept: NEPHROLOGY | Facility: CLINIC | Age: 87
End: 2022-02-09

## 2022-02-09 DIAGNOSIS — I48.91 ATRIAL FIBRILLATION, UNSPECIFIED TYPE (HCC): ICD-10-CM

## 2022-02-09 DIAGNOSIS — R26.81 GAIT INSTABILITY: ICD-10-CM

## 2022-02-09 DIAGNOSIS — E53.8 B12 DEFICIENCY: ICD-10-CM

## 2022-02-09 DIAGNOSIS — G93.0 CYST OF BRAIN: ICD-10-CM

## 2022-02-09 DIAGNOSIS — F41.9 ANXIETY: ICD-10-CM

## 2022-02-09 DIAGNOSIS — N18.32 STAGE 3B CHRONIC KIDNEY DISEASE (HCC): ICD-10-CM

## 2022-02-09 DIAGNOSIS — F03.90 MILD DEMENTIA (HCC): Primary | ICD-10-CM

## 2022-02-09 PROCEDURE — 99483 ASSMT & CARE PLN PT COG IMP: CPT | Performed by: STUDENT IN AN ORGANIZED HEALTH CARE EDUCATION/TRAINING PROGRAM

## 2022-02-09 NOTE — TELEPHONE ENCOUNTER
New Patient Intake Form   Patient Details   Nicolasa Monique     1935     [id number]     Appointment Information   Who is calling to schedule? If not patient, what is callers name? Physician office    Referring Provider Dr Cira Rolon   Referring Provider Number  [phone]   Reason for Appt (Diagnosis) N18 32 (ICD-10-CM) - Stage 3b chronic kidney disease ([facility] [address] )   Is patient aware of why they are being referred? Yes   Does Patient have labs done at [place] [address]? If not, where do they go? [facility] - In Epic   Has patient had labs / urine work done? List date of most recent lab / urine work Yes   Has patient had a BMP & CBC done in the past 2 years? If so, list the date Yes   Has patient been hospitalized recently? If yes, list name and location of hospital they were In No   Has patient been seen by a Nephrologist before? If yes, list name, location and phone number No   Has the patient had imaging done? If so, list the most recent date and type of imagineg Yes   Does the patient has a stone analysis report if history of kidney stones? n/a   Appointment Details   Is there a referral on file?  Yes   Appointment Date 2/10/22   [facility]

## 2022-02-09 NOTE — PROGRESS NOTES
Virtual Regular Visit    Verification of patient location:    Patient is located in the following state in which I hold an active license [place]      Assessment/Plan:    Problem List Items Addressed This Visit        Cardiovascular and Mediastinum    Atrial fibrillation ([facility] [address] )       Nervous and Auditory    Mild dementia ([facility] [address] ) - Primary    Cyst of brain    Relevant Orders    MRI brain with contrast       Genitourinary    CKD (chronic kidney disease) stage 3, GFR 30-59 ml/min ([facility] [address] )    Relevant Orders    Ambulatory Referral to Nephrology       Other    Gait instability    Anxiety    B12 deficiency     Started on Vitamin B12 supplements             See assessment and plan as outlined in care plan note below         Reason for visit is   Chief Complaint   Patient presents with    Virtual Regular Visit        Encounter provider Jacquelyne Epley, MD    Provider located at [address] [address] [phone]      Recent Visits  Date Type Provider Dept   02/09/22 [address], MD [address] recent visits within past 7 days and meeting all other requirements  Future Appointments  No visits were found meeting these conditions  Showing future appointments within next 150 days and meeting all other requirements       The patient was identified by name and date of birth  Poonam Adjutant was informed that this is a telemedicine visit and that the visit is being conducted through [employer] and patient was informed that this is a secure, HIPAA-compliant platform  She agrees to proceed     My office door was closed  No one else was in the room  She acknowledged consent and understanding of privacy and security of the video platform  The patient has agreed to participate and understands they can discontinue the visit at any time  Patient is aware this is a billable service         Decision-making capacity:  The patient should not make any important medical or financial decisions independently without her POA    Staging: Mild dementia, FAST stage 4    Medications Review:  Patient has been on chronic benzodiazepine therapy  Will plan to wean in the future after repeat MRI completed    Subjective  Trae Chandler is a 80 y o  female who presents for her care plan conference   HPI:    We had the pleasure of seeing Trae Chandler who is a 80 y o  female in Geriatric follow up today  Ms Grant Martin is present virtually with her  Octavio Babcock and daughter Vonda Garsia  Son Emmette Simmonds and daughter Mercedes Hubbard were present via phone  Since prior geriatric assessment intake, no significant acute changes  The patient remains sedentary for the most part and mood has been dysphoric  No cardiorespiratory distress, fever, chills, URI or urinary symptoms  We did review patient's MRI results which showed a multiloculated cyst with recommendations for MRI with contrast   Family understandably concerned given patient's CKD 3B and use of contrast   Will refer to nephrology for consultation advised prior to proceeding with MRI        COGNITION:  Memory Issues noticed since 2 years    Memory affected: short term memory loss    Symptoms started: gradual  Over time the memory has:  worsened  Memory issue(s) were noted by: family   Patient has difficulties with recalling short term events  Difficulty finding the right word while speaking: Yes  Requires repeat information or asking the same question repeatedly: Yes  Fluctuation in alertness: No  Changes in mood or personality:Yes  Current or previous treatment for depression or anxiety: Yes    Family member with dementia and what type? no  History of head trauma: No  History of stroke: No  History of alcohol or substance abuse: No    FUNCTIONAL STATUS:  BADLs  Does patient require assistance with:  Bathing: No  Dressing: No  Toileting: No  Transferring: No  Continence: No  Feeding: No    IADLs  Dose patient require assistance with:  Telephone: Yes  Shopping: Yes  Food Preparation: Yes  Housekeeping: Yes  Laundry: Yes  Transportation: Yes  Medications: Yes  Finances: Yes    NEUROPSYCH SYMPTOMS:  Does patient get angry or hostile? Resist care from others? No  Does patient see or hear things that no one else can see or hear? No  Does patient act impatient and cranky? Does mood frequently change for no apparent reason? Yes  Does patient act suspicious without good reason (example: believes that others are stealing from him or her, or planning to harm him or her in some way)? No  Does patient less interested in his or her usual activities or in the activities and plans of others? Yes  Does patient have trouble sleeping at night? Yes  Dose patient have abnormal movements while asleep?  No    SAFETY:  Hearing and vision issue: No  Any gait or balance disorder: Yes  Uses: walker  Any falls in the last year: No  Any history of wandering: No  Are there firearms or guns in the home: No  Does patient drive: No  Any driving accidents or citations in the last year: No  Any concerns about patient's ability to drive: Yes    ACP REVIEW:  Does patient have POA: Yes  Does patient have a Living will: Yes  Any legal assistance needed for healthcare planning?: No      Past Medical History:   Diagnosis Date    Arthritis     Borderline diabetes     watches diet    Diabetes mellitus ([address] )     Dry eye syndrome, bilateral     Full dentures     Gastric ulcer     dx in 9/2017    Glaucoma     had laser    History of mammogram 2018    Hyperlipidemia     controlled    Hypertension     controlled    Irregular heart beat 09/2017    "pre-mature" beat-saw cardiologist-12/17-Holter monitor 1/18    Ovarian cyst 07/06/2018    Papanicolaou smear for cervical cancer screening 06/05/2015    Neg     PONV (postoperative nausea and vomiting)     Presence of intracervical pessary     Wears glasses        Past Surgical History:   Procedure Laterality Date    APPENDECTOMY      age 6    COLONOSCOPY  09/13/2017    DILATION AND CURETTAGE OF UTERUS      EGD  01/22/2018    Ulcers healed    EGD AND COLONOSCOPY      ESOPHAGOGASTRIC FUNDOPLASTY  09/13/2017    JOINT REPLACEMENT Bilateral     knees    ROTATOR CUFF REPAIR Left     titanium screw    TONSILLECTOMY         Current Outpatient Medications   Medication Sig Dispense Refill    Acetaminophen 325 MG CAPS Take 650 mg by mouth every 6 (six) hours      ALPRAZolam (XANAX) 0 5 mg tablet Take by mouth in the morning        amLODIPine-benazepril (LOTREL 5-10) 5-10 MG per capsule Take 1 capsule by mouth daily      apixaban (ELIQUIS) 5 mg Take 5 mg by mouth 2 (two) times a day      ascorbic acid (VITAMIN C) 500 mg tablet Take 500 mg by mouth every morning      brimonidine (ALPHAGAN P) 0 1 % 1 drop in the morning Each eye        Calcium Citrate (CITRACAL PO) Take by mouth every morning      pravastatin (PRAVACHOL) 40 mg tablet Take 40 mg by mouth daily at bedtime      Propylene Glycol (SYSTANE BALANCE) 0 6 % SOLN 4 (four) times a day Each eye       quinapril (ACCUPRIL) 10 mg tablet Take 10 mg by mouth every morning      rivaroxaban (Xarelto) 15 mg tablet Take 15 mg by mouth daily with dinner        apixaban (Eliquis) 5 mg Take 5 mg by mouth 2 (two) times a day      CLINDAMYCIN HCL PO Take 600 mg by mouth as needed Prior to invasive procedures   (Patient not taking: Reported on 2/9/2022 )      clotrimazole-betamethasone (LOTRISONE) 1-0 05 % cream Apply topically 2 (two) times a day (Patient not taking: Reported on 9/21/2021) 30 g 0    latanoprost (XALATAN) 0 005 % ophthalmic solution Administer 1 drop to both eyes daily at bedtime (Patient not taking: Reported on 9/21/2021)      Mirabegron ER 50 MG TB24 Take 1 tablet (50 mg total) by mouth daily at bedtime (Patient not taking: Reported on 9/21/2021) 30 tablet 9    Multiple Vitamins-Minerals (PRESERVISION AREDS 2+MULTI VIT PO) Take 2 tablets by mouth daily (Patient not taking: Reported on 9/21/2021)      multivitamin [facility]) TABS Take 1 tablet by mouth every morning (Patient not taking: Reported on 2/9/2022 )      omeprazole (PriLOSEC) 40 MG capsule Take 40 mg by mouth daily at bedtime (Patient not taking: Reported on 9/21/2021)      Polyethyl Glycol-Propyl Glycol (SYSTANE OP) Apply to eye 4 (four) times a day Each eye (Patient not taking: Reported on 9/21/2021)       No current facility-administered medications for this visit  Allergies   Allergen Reactions    Erythromycin GI Intolerance     "stomach burning", bruises    Minocycline GI Intolerance     "stomach burning", bruises    Penicillins GI Intolerance     "burning stomach", bruising    Propoxyphene GI Intolerance    Tetracycline GI Intolerance     "stomach burning"-bruising    Vicodin [Hydrocodone-Acetaminophen] GI Intolerance     "stomach burning" ,nausea    Prednisone     Avelox [Moxifloxacin] GI Intolerance     Avoids d/t N/V    Percocet [Oxycodone-Acetaminophen] GI Intolerance     Avoids d/t N/V    Sulfa Antibiotics Rash       Review of Systems   Constitutional: Positive for activity change and fatigue  Negative for chills and fever  HENT: Negative for congestion, ear pain, rhinorrhea and sore throat  Eyes: Negative for discharge  Respiratory: Negative for cough, chest tightness, shortness of breath, wheezing and stridor  Cardiovascular: Negative for chest pain, palpitations and leg swelling  Gastrointestinal: Negative for abdominal pain, constipation, diarrhea, nausea and vomiting  Genitourinary: Positive for frequency and urgency  Negative for dysuria  Musculoskeletal: Positive for gait problem  Skin: Negative for color change, pallor, rash and wound  Neurological: Positive for speech difficulty  Negative for dizziness and light-headedness  Psychiatric/Behavioral: Positive for decreased concentration, dysphoric mood and sleep disturbance  Video Exam    There were no vitals filed for this visit  Physical Exam  Constitutional:       General: She is not in acute distress  Appearance: Normal appearance  She is not ill-appearing or diaphoretic  HENT:      Head: Normocephalic and atraumatic  Right Ear: External ear normal       Left Ear: External ear normal       Nose: Nose normal       Mouth/Throat:      Mouth: Mucous membranes are moist    Eyes:      General:         Right eye: No discharge  Left eye: No discharge  Conjunctiva/sclera: Conjunctivae normal    Pulmonary:      Effort: Pulmonary effort is normal  No respiratory distress  Abdominal:      General: There is no distension  Palpations: Abdomen is soft  Tenderness: There is no abdominal tenderness  Musculoskeletal:         General: Normal range of motion  Cervical back: Normal range of motion  Skin:     General: Skin is dry  Neurological:      General: No focal deficit present  Mental Status: She is alert  Mental status is at baseline  Gait: Gait abnormal    Psychiatric:      Comments: Dysphoric at baseline          Labs: Total cholesterol 139,LDL 70  HbA1C: 5 6  (11/1/21)B12: 228  (11/1/21)TSH 1 06    MRI BRAIN  1  Incidental large (6 3 x 4 7 cm) multiloculated cystic lesion centered in the ventricular septum, eccentric to the right, demonstrating CSF signal intensity  Recommend further characterization with contrast-enhanced MRI, if this is not previously   characterized  No prior outside imaging is available in PACS      2   Mildly prominent ventricular system is most likely related to peripheral parenchymal atrophy  No hydrocephalus        3  NeuroQuant analysis was performed: Normal study; Does not support mesial temporal lobe focused neurodegeneration  I spent 60 minutes directly with the patient during this visit    VIRTUAL VISIT DISCLAIMER      Nicolasa Monique verbally agrees to participate in [employer]   Pt is aware that [employer] could be limited without vital signs or the ability to perform a full hands-on physical Lynn Mao understands she or the provider may request at any time to terminate the video visit and request the patient to seek care or treatment in person

## 2022-02-10 ENCOUNTER — CONSULT (OUTPATIENT)
Dept: NEPHROLOGY | Facility: CLINIC | Age: 87
End: 2022-02-10
Payer: MEDICARE

## 2022-02-10 VITALS
WEIGHT: 209 LBS | HEIGHT: 64 IN | BODY MASS INDEX: 35.68 KG/M2 | DIASTOLIC BLOOD PRESSURE: 70 MMHG | SYSTOLIC BLOOD PRESSURE: 128 MMHG

## 2022-02-10 DIAGNOSIS — E66.01 OBESITY, MORBID, BMI 40.0-49.9 (HCC): ICD-10-CM

## 2022-02-10 DIAGNOSIS — I10 PRIMARY HYPERTENSION: ICD-10-CM

## 2022-02-10 DIAGNOSIS — E11.42 DIABETIC PERIPHERAL NEUROPATHY ASSOCIATED WITH TYPE 2 DIABETES MELLITUS (HCC): Primary | ICD-10-CM

## 2022-02-10 DIAGNOSIS — N18.32 STAGE 3B CHRONIC KIDNEY DISEASE (HCC): ICD-10-CM

## 2022-02-10 DIAGNOSIS — G93.0 CYST OF BRAIN: ICD-10-CM

## 2022-02-10 PROBLEM — F03.A0 MILD DEMENTIA: Status: ACTIVE | Noted: 2022-01-05

## 2022-02-10 PROBLEM — F03.90 MILD DEMENTIA (HCC): Status: ACTIVE | Noted: 2022-01-05

## 2022-02-10 PROBLEM — E53.8 B12 DEFICIENCY: Status: ACTIVE | Noted: 2022-02-10

## 2022-02-10 PROCEDURE — 99204 OFFICE O/P NEW MOD 45 MIN: CPT | Performed by: INTERNAL MEDICINE

## 2022-02-10 RX ORDER — AMLODIPINE BESYLATE 5 MG/1
5 TABLET ORAL DAILY
COMMUNITY

## 2022-02-10 NOTE — PATIENT INSTRUCTIONS
1  )  Low 2 g sodium diet    2 )  Monitor weights at home    3 )  Avoid NSAIDs (ibuprofen, Motrin, Advil, Aleve, naproxen)    4 )  Monitor blood pressure at home, call if blood pressure greater than 150/90 persistently    5 ) I will plan to discuss all results including blood work, and/or imaging at our next visit, unless there is an urgent indication, in which case I will call you earlier  If you have any questions or concerns about your results, please feel free to call our office  6 ) Repeat Kidney Ultrasound    7 ) Ok to proceed with MRI  Chronic Kidney Disease   WHAT YOU NEED TO KNOW:   Chronic kidney disease (CKD) is the gradual and permanent loss of kidney function  It is also called chronic kidney failure, or chronic renal insufficiency  Normally, the kidneys remove fluid, chemicals, and waste from your blood  These wastes are turned into urine by your kidneys  CKD may worsen over time and lead to kidney failure  Your CKD team will help you and your family plan for your care at home  The team will help you create goals and find ways to meet your goals  Your care plan may change over time as your needs change  DISCHARGE INSTRUCTIONS:   Call your local emergency number (911 in the [address] [place] [address],[address]) if:   · You have a seizure  · You have shortness of breath  Return to the emergency department if:   · You are confused and very drowsy  Call your doctor or nephrologist if:   · You suddenly gain or lose more weight than your healthcare provider has told you is okay  · You have itchy skin or a rash  · You urinate more or less than you normally do  · You have blood in your urine  · You have nausea and are vomiting  · You have fatigue or muscle weakness  · You have hiccups that will not stop  · You have questions or concerns about your condition or care  Medicines:   · Medicines  may be given to decrease blood pressure and get rid of extra fluid   You may also receive medicine to manage health conditions that may occur with CKD, such as anemia, diabetes, and heart disease  · Take your medicine as directed  Contact your healthcare provider if you think your medicine is not helping or if you have side effects  Tell him or her if you are allergic to any medicine  Keep a list of the medicines, vitamins, and herbs you take  Include the amounts, and when and why you take them  Bring the list or the pill bottles to follow-up visits  Carry your medicine list with you in case of an emergency  What you can do to manage CKD: Management may include making some lifestyle changes  Tell your healthcare provider if you have any concerns about being able to make changes  He or she can help you find solutions, including working with specialists  Ask for help creating a plan to break large goals into smaller steps  Your plan may include any of the following:  · Manage other health conditions  Your healthcare provider will work with you to make a care plan that meets your needs  You will be checked regularly for heart disease or other conditions that can make CKD worse, such as diabetes  Your blood pressure will be closely monitored  You will also get a target blood pressure and help making a plan to reach your target  This may include taking your blood pressure at home  · Maintain a healthy weight  Your weight and body mass index (BMI) will be checked regularly  BMI helps find if your weight is healthy for your height  Your healthcare provider will use other tests to check your muscle and protein levels  Extra weight can strain your kidneys  A low weight or low muscle mass can make you feel more tired  You may have trouble doing your daily activities  Ask your provider what a healthy weight is for you  He or she can help you create a plan to lose or gain weight safely, if needed  The plan may include keeping a food diary  This is a list of foods and liquids you have each day   Your provider will use the diary to help you make changes, if needed  Changes are based on your health and any other conditions you have, such as diabetes  · Create an exercise plan  Regular exercise can help you manage CKD, high blood pressure, and diabetes  Exercise also helps control weight  Your provider can help you create exercise goals and a plan to reach those goals  For example, your goal may be to exercise for 30 minutes in a day  Your plan can include breaking exercise into 10 minute sessions, 3 times during the day  · Create a healthy eating plan  Your provider may tell you to eat food low in potassium, phosphorus, or protein  Your provider may also recommend vitamin or mineral supplements  Do not take any supplements without talking to your provider  A dietitian can help you plan meals if needed  Ask how much liquid to drink each day and which liquids are best for you  · Limit sodium (salt) as directed  You may need to limit sodium to less than 2,300 milligrams (mg) each day  Ask your dietitian or healthcare provider how much sodium you can have each day  The amount depends on your stage of kidney disease  Table salt, canned foods, soups, salted snacks, and processed meats, like deli meats and sausage, are high in sodium  Your provider or a dietitian can show you how to read food labels for sodium  · Limit alcohol as directed  Alcohol can cause fluid retention and can affect your kidneys  Ask how much alcohol is safe for you  A drink of alcohol is 12 ounces of beer, 5 ounces of wine, or 1½ ounces of liquor  · Do not smoke  Nicotine and other chemicals in cigarettes and cigars can cause kidney damage  Ask your provider for information if you currently smoke and need help to quit  E-cigarettes or smokeless tobacco still contain nicotine  Talk to your provider before you use these products  · Ask about over-the-counter medicines  Medicines such as NSAIDs and laxatives may harm your kidneys   Some cough and cold medicines can raise your blood pressure  Always ask if a medicine is safe before you take it  · Ask about vaccines you may need  CKD can increase your risk for infections such as pneumonia, influenza, and hepatitis  Vaccines lower your risk for infection  Your healthcare provider will tell you which vaccines you need and when to get them  Follow up with your doctor or nephrologist as directed: You will need to return for tests to monitor your kidney and nerve function, and your parathyroid hormone level  Your medicines may be changed, based on certain test results  Write down your questions so you remember to ask them during your visits  © Copyright [employer] 2021 Information is for End User's use only and may not be sold, redistributed or otherwise used for commercial purposes  All illustrations and images included in CareNotes® are the copyrighted property of A [employer]  or Emir Marshall  The above information is an  only  It is not intended as medical advice for individual conditions or treatments  Talk to your doctor, nurse or pharmacist before following any medical regimen to see if it is safe and effective for you

## 2022-02-10 NOTE — PROGRESS NOTES
Decision-making capacity:  The patient should not make any important medical or financial decisions independently without her POA    Staging: Mild dementia, FAST stage 4    Medications Review:  Patient has been on chronic benzodiazepine therapy  Will plan to wean in the future after repeat MRI completed      HPI:    We had the pleasure of seeing Dee Dee Corcoran who is a 80 y o  female in Geriatric follow up today  Ms Krunal Mojica is present virtually with her  Noy Madrigal and daughter Emi Gregory  Son Bibiana Martinez and daughter Benito Crooks were present via phone  Since prior geriatric assessment intake, no significant acute changes  The patient remains sedentary for the most part and mood has been dysphoric  No cardiorespiratory distress, fever, chills, URI or urinary symptoms  We did review patient's MRI results which showed a multiloculated cyst with recommendations for MRI with contrast   Family understandably concerned given patient's CKD 3B and use of contrast   Will refer to nephrology for consultation advised prior to proceeding with MRI        COGNITION:  Memory Issues noticed since 2 years    Memory affected: short term memory loss    Symptoms started: gradual  Over time the memory has:  worsened  Memory issue(s) were noted by: family   Patient has difficulties with recalling short term events  Difficulty finding the right word while speaking: Yes  Requires repeat information or asking the same question repeatedly: Yes  Fluctuation in alertness: No  Changes in mood or personality:Yes  Current or previous treatment for depression or anxiety: Yes    Family member with dementia and what type? no  History of head trauma: No  History of stroke: No  History of alcohol or substance abuse: No    FUNCTIONAL STATUS:  BADLs  Does patient require assistance with:  Bathing: No  Dressing: No  Toileting: No  Transferring: No  Continence: No  Feeding: No    IADLs  Dose patient require assistance with:  Telephone: Yes  Shopping: Yes  Food Preparation: Yes  Housekeeping: Yes  Laundry: Yes  Transportation: Yes  Medications: Yes  Finances: Yes    NEUROPSYCH SYMPTOMS:  Does patient get angry or hostile? Resist care from others? No  Does patient see or hear things that no one else can see or hear? No  Does patient act impatient and cranky? Does mood frequently change for no apparent reason? Yes  Does patient act suspicious without good reason (example: believes that others are stealing from him or her, or planning to harm him or her in some way)? No  Does patient less interested in his or her usual activities or in the activities and plans of others? Yes  Does patient have trouble sleeping at night? Yes  Dose patient have abnormal movements while asleep?  No    SAFETY:  Hearing and vision issue: No  Any gait or balance disorder: Yes  Uses: walker  Any falls in the last year: No  Any history of wandering: No  Are there firearms or guns in the home: No  Does patient drive: No  Any driving accidents or citations in the last year: No  Any concerns about patient's ability to drive: Yes    ACP REVIEW:  Does patient have POA: Yes  Does patient have a Living will: Yes  Any legal assistance needed for healthcare planning?: No    ROS: Review of Systems    Allergies   Allergen Reactions    Erythromycin GI Intolerance     "stomach burning", bruises    Minocycline GI Intolerance     "stomach burning", bruises    Penicillins GI Intolerance     "burning stomach", bruising    Propoxyphene GI Intolerance    Tetracycline GI Intolerance     "stomach burning"-bruising    Vicodin [Hydrocodone-Acetaminophen] GI Intolerance     "stomach burning" ,nausea    Prednisone     Avelox [Moxifloxacin] GI Intolerance     Avoids d/t N/V    Percocet [Oxycodone-Acetaminophen] GI Intolerance     Avoids d/t N/V    Sulfa Antibiotics Rash       Medications:    Current Outpatient Medications on File Prior to Visit   Medication Sig Dispense Refill    Acetaminophen 325 MG CAPS Take 650 mg by mouth every 6 (six) hours      ALPRAZolam (XANAX) 0 5 mg tablet Take by mouth in the morning        amLODIPine-benazepril (LOTREL 5-10) 5-10 MG per capsule Take 1 capsule by mouth daily      apixaban (ELIQUIS) 5 mg Take 5 mg by mouth 2 (two) times a day      ascorbic acid (VITAMIN C) 500 mg tablet Take 500 mg by mouth every morning      brimonidine (ALPHAGAN P) 0 1 % 1 drop in the morning Each eye        Calcium Citrate (CITRACAL PO) Take by mouth every morning      pravastatin (PRAVACHOL) 40 mg tablet Take 40 mg by mouth daily at bedtime      Propylene Glycol (SYSTANE BALANCE) 0 6 % SOLN 4 (four) times a day Each eye       quinapril (ACCUPRIL) 10 mg tablet Take 10 mg by mouth every morning      rivaroxaban (Xarelto) 15 mg tablet Take 15 mg by mouth daily with dinner        apixaban (Eliquis) 5 mg Take 5 mg by mouth 2 (two) times a day      CLINDAMYCIN HCL PO Take 600 mg by mouth as needed Prior to invasive procedures   (Patient not taking: Reported on 2/9/2022 )      clotrimazole-betamethasone (LOTRISONE) 1-0 05 % cream Apply topically 2 (two) times a day (Patient not taking: Reported on 9/21/2021) 30 g 0    latanoprost (XALATAN) 0 005 % ophthalmic solution Administer 1 drop to both eyes daily at bedtime (Patient not taking: Reported on 9/21/2021)      Mirabegron ER 50 MG TB24 Take 1 tablet (50 mg total) by mouth daily at bedtime (Patient not taking: Reported on 9/21/2021) 30 tablet 9    Multiple Vitamins-Minerals (PRESERVISION AREDS 2+MULTI VIT PO) Take 2 tablets by mouth daily (Patient not taking: Reported on 9/21/2021)      multivitamin (THERAGRAN) TABS Take 1 tablet by mouth every morning (Patient not taking: Reported on 2/9/2022 )      omeprazole (PriLOSEC) 40 MG capsule Take 40 mg by mouth daily at bedtime (Patient not taking: Reported on 9/21/2021)      Polyethyl Glycol-Propyl Glycol (SYSTANE OP) Apply to eye 4 (four) times a day Each eye (Patient not taking: Reported on 9/21/2021)       No current facility-administered medications on file prior to visit         History:  Past Medical History:   Diagnosis Date    Arthritis     Borderline diabetes     watches diet    Diabetes mellitus ([address] )     Dry eye syndrome, bilateral     Full dentures     Gastric ulcer     dx in 9/2017    Glaucoma     had laser    History of mammogram 2018    Hyperlipidemia     controlled    Hypertension     controlled    Irregular heart beat 09/2017    "pre-mature" beat-saw cardiologist-12/17-Holter monitor 1/18    Ovarian cyst 07/06/2018    Papanicolaou smear for cervical cancer screening 06/05/2015    Neg     PONV (postoperative nausea and vomiting)     Presence of intracervical pessary     Wears glasses      Past Surgical History:   Procedure Laterality Date    APPENDECTOMY      age 6    COLONOSCOPY  09/13/2017    DILATION AND CURETTAGE OF UTERUS      EGD  01/22/2018    Ulcers healed    EGD AND COLONOSCOPY      ESOPHAGOGASTRIC FUNDOPLASTY  09/13/2017    JOINT REPLACEMENT Bilateral     knees    ROTATOR CUFF REPAIR Left     titanium screw    TONSILLECTOMY       Family History   Problem Relation Age of Onset    Cancer Mother         breast    Melanoma Mother         Malignant, internal melanoma     Heart disease Brother         CHF     Social History     Socioeconomic History    Marital status: /Civil Union     Spouse name: Not on file    Number of children: Not on file    Years of education: Not on file    Highest education level: Not on file   Occupational History    Not on file   Tobacco Use    Smoking status: Never Smoker    Smokeless tobacco: Never Used   Substance and Sexual Activity    Alcohol use: Yes     Comment: occ    Drug use: No    Sexual activity: Not Currently     Partners: Male   Other Topics Concern    Not on file   Social History Narrative    No alcohol intake - As per Sudha     Caffeine intake: None    Seat belts used routinely     Advance directive: No     Social Determinants of Health     Financial Resource Strain: Not on file   Food Insecurity: Not on file   Transportation Needs: Not on file   Physical Activity: Not on file   Stress: Not on file   Social Connections: Not on file   Intimate Partner Violence: Not on file   Housing Stability: Not on file     Past Surgical History:   Procedure Laterality Date    APPENDECTOMY      age 6    COLONOSCOPY  09/13/2017    DILATION AND CURETTAGE OF UTERUS      EGD  01/22/2018    Ulcers healed    EGD AND COLONOSCOPY      ESOPHAGOGASTRIC FUNDOPLASTY  09/13/2017    JOINT REPLACEMENT Bilateral     knees    ROTATOR CUFF REPAIR Left     titanium screw    TONSILLECTOMY         OBJECTIVE:  There were no vitals filed for this visit  There is no height or weight on file to calculate BMI  Physical Exam    MoCA: ***/30      Labs & Imaging:  No results found for: WBC, HGB, HCT, MCV, PLT  No results found for: SODIUM, K, CL, CO2, BUN, CREATININE, GLUC, CALCIUM  No results found for: [id number]  No results found for: [id number], TSH  No results found for: [id number], [id number]   No results found for this or any previous visit

## 2022-02-10 NOTE — PROGRESS NOTES
NEPHROLOGY OUTPATIENT CONSULTATION   Tayler Fitzgerald 80 y o  female MRN: [id number]  Date: 2/10/2022  Reason for consultation:   Chief Complaint   Patient presents with    Consult       ASSESSMENT and PLAN:    Thank you for the courtesy of this consultation  I had the pleasure of seeing José Antonio Livingston today in the renal clinic for the initial management of chronic kidney disease  Chronic Kidney Disease Stage IIIB  --Imagin renal ultrasound reviewed  Kidneys were noted to be symmetrical and normal size  Both kidneys were diffusely echogenic consistent with underlying chronic kidney disease  There was numerous of simple cyst on the left kidney  And there was a right upper pole renal cyst about 4 1 cm that was Bosniak 2, also on the left kidney which is usually a benign  Will check a repeat renal ultrasound, there pelvic cyst which was followed up by a pelvic ultrasound shortly after  --Urinalysis:  Did not provide a urine specimen in the office  Will check a urinalysis at the next visit  --Proteinuria:  Screen for microalbuminuria  --Baseline creatinine: Low 1's, 1 1-1 4 mg/dL more recently  --Etiology:  Age-related nephron loss, hypertensive nephrosclerosis  --Biopsy Proven:  No  --Status:  Stable since 2018  --Management:  Repeat the renal ultrasound, check urinalysis, repeat blood work in 6 months, okay to proceed with MRI as this is utilize with gadolinium   --Reducing Cardiovascular Risk Factors:  Simvastatin+ Ezetimibe reduced atherosclerotic events in CKD (SHARP trial);  Low dose aspirin safe (if no contraindications exist); smoking is an independent risk factor for Chronic Kidney Disease and progression, strongly recommend smoking cessation    Brain cyst  --plan for MRI with gadolinium  --okay to proceed with study  --discussed at length with the patient  --please notify Nephrology if any CT scan with contrast is planned  --at [address], Gadovist utilized, which is group II agent, which has few if any unfounded cases of NSF (her eGFR > 30 cc/min)    Pre diabetes  --exercise diet and weight loss  --on any medications    Hypertension  --amlodipine 5 mg daily, quinapril 20 mg daily  --blood pressure at target    Paroxysmal atrial fibrillation  --management as per Cardiology  --on anticoagulation with Eliquis    Anticoagulation in chronic kidney disease  --medication:  Eliquis 5 mg twice a day  --GFR/Creatinine Clearance: > 35 mL/min  --Indication:  Atrial fibrillation  --recommendation:  See below  --risk of major bleeding increases as level of renal dysfunction worsens, especially for eGFR < 30 cc/min  --non vitamin K oral anticoagulatants shown to be equal equivalent or superior efficacy and safety in comparison to warfarin in the general population (except for patients with mechanical valves)  However no data on the use of these agents in patients with eGFR < 30 cc/min  Non vitamin K anticoagulants and enoxaparin, need to be dose adjusted or voided in moderate Chronic Kidney Disease because they are dependent on the kidneys were elimination  Heparin warfarin are not dependent on the kidney for clearance and does require no dose adjustments in Chronic Kidney Disease  --Warfarin (Coumadin)   Renal elimination: none (hepatic clearance)   Dialysis removal: < 1% (4 hour treatment)   Dosage Recommendation:  Titrate per INR   Preop management:  Would recommend holding 3-5 days regardless of GFR, depending on the indication  --Apixaban (Eliquis)   Renal elimination:  Approximately 27%   Dialysis removal:  7% (4 hour treatment)   Dosage recommendation: GFR 30-50 cc/min 2 5 mg BID, dosage for atrial fibrillation, serum creatinine greater than 1 5 mg/dL and either age greater than 80 years or body weight  less than 60 kg 2 5 mg twice daily  Otherwise 5 mg twice daily   Dosage for DVT:  Avoid use of GFR less than 30 cc/minute  No dose adjustment of GFR greater than 30 cc/minute     Preoperative management:  Hold for 24-48 hours for GFR greater than 30 cc/min, 36-48 hours for GFR 15-29 cc/minute; 96 hours for GFR less than 15 cc/minute   Reversal:  Somewhat reversible with 4-Factor prothrombin complex concentrate (Andexanet fabby is the reversal agent)  --Rivaroxaban (Xarelto)   Renal elimination:  35%   Dialysis removal: < 1% (4 hour treatment)   Dosage recommendation:  GFR between 30-50 cc/minute:  15 mg daily; GFR less than 30 cc/minute:  Avoid   Preoperative management:  Hold for 24-48 hours for GFR greater than 30 cc/minute; 36-48 hours for GFR between 15 and 29 cc/minute; 96 hours for GFR less than 15 cc/minute   Reversal:  Somewhat reversible with 4-factor prothrombin complex concentrate (Andexanet fabby is a reversal agent)  --Dabigatran (Pradaxa)   Renal elimination:  80%   Dialysis removal:  50% (4 hour treatment), very dialyzable   Dose recommendation:  GFR between 30-50 cc/minute:  75 mg twice a day ; avoid the use if GFR less than 30 cc/minute   Preoperative management:  Hold for 24-48 hours for GFR greater than 60 cc/minute; 48-96 hours for GFR between 15-60 cc/minute;  hours for GFR less than 15 cc/minute   Reversal: Idarucizumab, neutralize is the anticoagulant effect of data get trend in 30 minutes; 6% risk of thrombotic complication  --Edoxaban (Savaysa)   Renal elimination:  50%   Dialysis removal: 9% (4 hour treatment)   Dosage recommendations:  GFR between 30-50 cc/minute 30 mg daily ; avoid the use of GFR less than 30 cc/minute  --for hemodialysis patient started on anticoagulation recommend reducing their intra dialytic heparin dose by at least 50%  --life-threatening bleeding:  Desmopressin IV 0 4 micrograms/kilogram over 10 minutes if there is a concern for uremic bleeding      PATIENT INSTRUCTIONS:    Patient Instructions     1 )  Low 2 g sodium diet    2 )  Monitor weights at home    3 )  Avoid NSAIDs (ibuprofen, Motrin, Advil, Aleve, naproxen)    4 )  Monitor blood pressure at home, call if blood pressure greater than 150/90 persistently    5 ) I will plan to discuss all results including blood work, and/or imaging at our next visit, unless there is an urgent indication, in which case I will call you earlier  If you have any questions or concerns about your results, please feel free to call our office  6 ) Repeat Kidney Ultrasound    7 ) Ok to proceed with MRI  Chronic Kidney Disease   WHAT YOU NEED TO KNOW:   Chronic kidney disease (CKD) is the gradual and permanent loss of kidney function  It is also called chronic kidney failure, or chronic renal insufficiency  Normally, the kidneys remove fluid, chemicals, and waste from your blood  These wastes are turned into urine by your kidneys  CKD may worsen over time and lead to kidney failure  Your CKD team will help you and your family plan for your care at home  The team will help you create goals and find ways to meet your goals  Your care plan may change over time as your needs change  DISCHARGE INSTRUCTIONS:   Call your local emergency number (911 in the [address] [place] [address],[address]) if:   · You have a seizure  · You have shortness of breath  Return to the emergency department if:   · You are confused and very drowsy  Call your doctor or nephrologist if:   · You suddenly gain or lose more weight than your healthcare provider has told you is okay  · You have itchy skin or a rash  · You urinate more or less than you normally do  · You have blood in your urine  · You have nausea and are vomiting  · You have fatigue or muscle weakness  · You have hiccups that will not stop  · You have questions or concerns about your condition or care  Medicines:   · Medicines  may be given to decrease blood pressure and get rid of extra fluid  You may also receive medicine to manage health conditions that may occur with CKD, such as anemia, diabetes, and heart disease  · Take your medicine as directed    Contact your healthcare provider if you think your medicine is not helping or if you have side effects  Tell him or her if you are allergic to any medicine  Keep a list of the medicines, vitamins, and herbs you take  Include the amounts, and when and why you take them  Bring the list or the pill bottles to follow-up visits  Carry your medicine list with you in case of an emergency  What you can do to manage CKD: Management may include making some lifestyle changes  Tell your healthcare provider if you have any concerns about being able to make changes  He or she can help you find solutions, including working with specialists  Ask for help creating a plan to break large goals into smaller steps  Your plan may include any of the following:  · Manage other health conditions  Your healthcare provider will work with you to make a care plan that meets your needs  You will be checked regularly for heart disease or other conditions that can make CKD worse, such as diabetes  Your blood pressure will be closely monitored  You will also get a target blood pressure and help making a plan to reach your target  This may include taking your blood pressure at home  · Maintain a healthy weight  Your weight and body mass index (BMI) will be checked regularly  BMI helps find if your weight is healthy for your height  Your healthcare provider will use other tests to check your muscle and protein levels  Extra weight can strain your kidneys  A low weight or low muscle mass can make you feel more tired  You may have trouble doing your daily activities  Ask your provider what a healthy weight is for you  He or she can help you create a plan to lose or gain weight safely, if needed  The plan may include keeping a food diary  This is a list of foods and liquids you have each day  Your provider will use the diary to help you make changes, if needed  Changes are based on your health and any other conditions you have, such as diabetes  · Create an exercise plan    Regular exercise can help you manage CKD, high blood pressure, and diabetes  Exercise also helps control weight  Your provider can help you create exercise goals and a plan to reach those goals  For example, your goal may be to exercise for 30 minutes in a day  Your plan can include breaking exercise into 10 minute sessions, 3 times during the day  · Create a healthy eating plan  Your provider may tell you to eat food low in potassium, phosphorus, or protein  Your provider may also recommend vitamin or mineral supplements  Do not take any supplements without talking to your provider  A dietitian can help you plan meals if needed  Ask how much liquid to drink each day and which liquids are best for you  · Limit sodium (salt) as directed  You may need to limit sodium to less than 2,300 milligrams (mg) each day  Ask your dietitian or healthcare provider how much sodium you can have each day  The amount depends on your stage of kidney disease  Table salt, canned foods, soups, salted snacks, and processed meats, like deli meats and sausage, are high in sodium  Your provider or a dietitian can show you how to read food labels for sodium  · Limit alcohol as directed  Alcohol can cause fluid retention and can affect your kidneys  Ask how much alcohol is safe for you  A drink of alcohol is 12 ounces of beer, 5 ounces of wine, or 1½ ounces of liquor  · Do not smoke  Nicotine and other chemicals in cigarettes and cigars can cause kidney damage  Ask your provider for information if you currently smoke and need help to quit  E-cigarettes or smokeless tobacco still contain nicotine  Talk to your provider before you use these products  · Ask about over-the-counter medicines  Medicines such as NSAIDs and laxatives may harm your kidneys  Some cough and cold medicines can raise your blood pressure  Always ask if a medicine is safe before you take it  · Ask about vaccines you may need    CKD can increase your risk for infections such as pneumonia, influenza, and hepatitis  Vaccines lower your risk for infection  Your healthcare provider will tell you which vaccines you need and when to get them  Follow up with your doctor or nephrologist as directed: You will need to return for tests to monitor your kidney and nerve function, and your parathyroid hormone level  Your medicines may be changed, based on certain test results  Write down your questions so you remember to ask them during your visits  © Copyright [employer] 2021 Information is for End User's use only and may not be sold, redistributed or otherwise used for commercial purposes  All illustrations and images included in CareNotes® are the copyrighted property of A D A M , Inc  or [facility]  The above information is an  only  It is not intended as medical advice for individual conditions or treatments  Talk to your doctor, nurse or pharmacist before following any medical regimen to see if it is safe and effective for you  HISTORY OF PRESENT ILLNESS:  Requesting Physician: Fortunato Hebert is a 80 y o  female who has a history of prediabetes for few years but is diet controlled, atrial fibrillation and underlying chronic kidney disease which has been quite stable since 2018 who presents due to concerns about undergoing an MRI with contrast   I explained to them that the contrast used for MRI is gadolinium which is not known to be nephrotoxic but the concern is if the reduced GFR can cause nephrogenic sclerosing fibrosis  I reassure them that at [address] [place] [address], which is a group 2 agent which has few if any unfounded cases of NSF and is okay to proceed  Her EGFR is also greater than 30 mL/minute  Her renal function currently is stable with a creatinine of 1 2 mg/dL on her most recent blood work from this past Monday  Her electrolytes are stable    Her blood pressure is controlled on ACE-inhibitor and calcium channel blocker  She is following with Urology and Gynecology for prolapse and urinary symptoms  She reports no chest pain or shortness of breath  She has become more forgetful over the past have a brain cyst and is plan for this MRI study      PAST MEDICAL HISTORY:  Past Medical History:   Diagnosis Date    Arthritis     Borderline diabetes     watches diet    Diabetes mellitus ([facility] [address] )     Dry eye syndrome, bilateral     Full dentures     Gastric ulcer     dx in 9/2017    Glaucoma     had laser    History of mammogram 2018    Hyperlipidemia     controlled    Hypertension     controlled    Irregular heart beat 09/2017    "pre-mature" beat-saw cardiologist-12/17-Holter monitor 1/18    Ovarian cyst 07/06/2018    Papanicolaou smear for cervical cancer screening 06/05/2015    Neg     PONV (postoperative nausea and vomiting)     Presence of intracervical pessary     Wears glasses        PAST SURGICAL HISTORY:  Past Surgical History:   Procedure Laterality Date    APPENDECTOMY      age 6    COLONOSCOPY  09/13/2017    DILATION AND CURETTAGE OF UTERUS      EGD  01/22/2018    Ulcers healed    EGD AND COLONOSCOPY      ESOPHAGOGASTRIC FUNDOPLASTY  09/13/2017    JOINT REPLACEMENT Bilateral     knees    ROTATOR CUFF REPAIR Left     titanium screw    TONSILLECTOMY         ALLERGIES:  Allergies   Allergen Reactions    Erythromycin GI Intolerance     "stomach burning", bruises    Minocycline GI Intolerance     "stomach burning", bruises    Penicillins GI Intolerance     "burning stomach", bruising    Propoxyphene GI Intolerance    Tetracycline GI Intolerance     "stomach burning"-bruising    Vicodin [Hydrocodone-Acetaminophen] GI Intolerance     "stomach burning" ,nausea    Prednisone     Avelox [Moxifloxacin] GI Intolerance     Avoids d/t N/V    Percocet [Oxycodone-Acetaminophen] GI Intolerance     Avoids d/t N/V    Sulfa Antibiotics Rash       SOCIAL HISTORY:  Social History     Substance and Sexual Activity   Alcohol Use Yes    Comment: occ     Social History     Substance and Sexual Activity   Drug Use No     Social History     Tobacco Use   Smoking Status Never Smoker   Smokeless Tobacco Never Used       FAMILY HISTORY:  Family History   Problem Relation Age of Onset    Cancer Mother         breast    Melanoma Mother         Malignant, internal melanoma     Heart disease Brother         CHF       MEDICATIONS:    Current Outpatient Medications:     Acetaminophen 325 MG CAPS, Take 650 mg by mouth every 6 (six) hours, Disp: , Rfl:     ALPRAZolam (XANAX) 0 5 mg tablet, Take by mouth in the morning  , Disp: , Rfl:     amLODIPine (NORVASC) 5 mg tablet, Take 5 mg by mouth daily, Disp: , Rfl:     apixaban (ELIQUIS) 5 mg, Take 5 mg by mouth 2 (two) times a day, Disp: , Rfl:     brimonidine (ALPHAGAN P) 0 1 %, 1 drop in the morning Each eye  , Disp: , Rfl:     Calcium Citrate (CITRACAL PO), Take by mouth every morning, Disp: , Rfl:     pravastatin (PRAVACHOL) 40 mg tablet, Take 40 mg by mouth daily at bedtime, Disp: , Rfl:     Propylene Glycol (SYSTANE BALANCE) 0 6 % SOLN, 4 (four) times a day Each eye , Disp: , Rfl:     quinapril (ACCUPRIL) 10 mg tablet, Take 20 mg by mouth daily  , Disp: , Rfl:     apixaban (Eliquis) 5 mg, Take 5 mg by mouth 2 (two) times a day (Patient not taking: Reported on 2/10/2022 ), Disp: , Rfl:     ascorbic acid (VITAMIN C) 500 mg tablet, Take 500 mg by mouth every morning (Patient not taking: Reported on 2/10/2022 ), Disp: , Rfl:     CLINDAMYCIN HCL PO, Take 600 mg by mouth as needed Prior to invasive procedures   (Patient not taking: Reported on 2/9/2022 ), Disp: , Rfl:     clotrimazole-betamethasone (LOTRISONE) 1-0 05 % cream, Apply topically 2 (two) times a day (Patient not taking: Reported on 9/21/2021), Disp: 30 g, Rfl: 0    latanoprost (XALATAN) 0 005 % ophthalmic solution, Administer 1 drop to both eyes daily at bedtime (Patient not taking: Reported on 9/21/2021), Disp: , Rfl:     Mirabegron ER 50 MG TB24, Take 1 tablet (50 mg total) by mouth daily at bedtime (Patient not taking: Reported on 9/21/2021), Disp: 30 tablet, Rfl: 9    Multiple Vitamins-Minerals (PRESERVISION AREDS 2+MULTI VIT PO), Take 2 tablets by mouth daily (Patient not taking: Reported on 9/21/2021), Disp: , Rfl:     multivitamin (THERAGRAN) TABS, Take 1 tablet by mouth every morning (Patient not taking: Reported on 2/9/2022 ), Disp: , Rfl:     omeprazole (PriLOSEC) 40 MG capsule, Take 40 mg by mouth daily at bedtime (Patient not taking: Reported on 9/21/2021), Disp: , Rfl:     Polyethyl Glycol-Propyl Glycol (SYSTANE OP), Apply to eye 4 (four) times a day Each eye (Patient not taking: Reported on 9/21/2021), Disp: , Rfl:     rivaroxaban (Xarelto) 15 mg tablet, Take 15 mg by mouth daily with dinner   (Patient not taking: Reported on 2/10/2022 ), Disp: , Rfl:     REVIEW OF SYSTEMS:  Review of Systems   Constitutional: Negative for activity change and fever  Respiratory: Negative for cough, chest tightness, shortness of breath and wheezing  Cardiovascular: Negative for chest pain and leg swelling  Gastrointestinal: Negative for abdominal pain, diarrhea, nausea and vomiting  Endocrine: Negative for polyuria  Genitourinary: Negative for difficulty urinating, dysuria, flank pain, frequency and urgency  Skin: Negative for rash  Neurological: Positive for speech difficulty  Negative for dizziness, syncope, light-headedness and headaches  All the systems were reviewed and were negative except as documented on the HPI  PHYSICAL EXAM:  Current Weight: Body mass index is 35 87 kg/m²  Vitals:    02/10/22 0817   Weight: 94 8 kg (209 lb)   Height: 5' 4" (1 626 m)       Physical Exam  Vitals and nursing note reviewed  Exam conducted with a chaperone present  Constitutional:       General: She is not in acute distress  Appearance: She is well-developed   She is not diaphoretic  HENT:      Head: Normocephalic and atraumatic  Eyes:      General: No scleral icterus  Pupils: Pupils are equal, round, and reactive to light  Cardiovascular:      Rate and Rhythm: Normal rate and regular rhythm  Heart sounds: Normal heart sounds  No murmur heard  No friction rub  No gallop  Pulmonary:      Effort: Pulmonary effort is normal  No respiratory distress  Breath sounds: Normal breath sounds  No wheezing or rales  Chest:      Chest wall: No tenderness  Abdominal:      General: Bowel sounds are normal  There is no distension  Palpations: Abdomen is soft  Tenderness: There is no abdominal tenderness  There is no rebound  Musculoskeletal:         General: Normal range of motion  Cervical back: Normal range of motion and neck supple  Left lower leg: Edema present  Skin:     Findings: No rash  Neurological:      Mental Status: She is alert and oriented to person, place, and time  Laboratory results:   Results for orders placed or performed in visit on 07/20/21   Tissue Exam   Result Value Ref Range    Case Report       Surgical Pathology Report                         Case: [id number]                                   Authorizing Provider:  Laurie Mccurdy MD        Collected:           07/20/2021 1141              Ordering Location:     [facility] [place]    Received:            07/20/2021 1141                                     Area                                                                         Pathologist:           Flower Nicholson MD                                                                  Specimen:    Endometrium                                                                                Final Diagnosis       A  Endometrium, biopsy:  - Granulation tissue and detached benign squamous epithelium, compatible with uterine prolapse  - No endometrium present      Comment: Clinical correlation recommended, Re-biopsy as clinically indicated  Microscopic Description       Immunohistochemistry for pan cytokeratin AE1/AE3 highlights benign squamous cells  Additional Information       All reported additional testing was performed with appropriately reactive controls  These tests were developed and their performance characteristics determined by [facility] or appropriate performing facility, though some tests may be performed on tissues which have not been validated for performance characteristics (such as staining performed on alcohol exposed cell blocks and decalcified tissues)  Results should be interpreted with caution and in the context of the patients clinical condition  These tests may not be cleared or approved by the [place]  Food and Drug Administration, though the FDA has determined that such clearance or approval is not necessary  These tests are used for clinical purposes and they should not be regarded as investigational or for research  This laboratory has been approved by [facility], designated as a high-complexity laboratory and is qualified to perform these tests  Interpretation performed at [facility], [address] [place] [address]      Gross Description          A  The specimen is received in formalin, labeled with the patient's name and hospital number, and is designated " endometrium "  The specimen consists of multiple colorless, friable irregularly-shaped soft tissue fragments measuring in aggregate of 0 5 x 0 4 x 0 1 cm  The specimen is drained into an embedding bag  Entirely submitted  One cassette  Note: The estimated total formalin fixation time based upon information provided by the submitting clinician and the standard processing schedule is under 72 hours   VAlvarez          Liquid-based pap, screening   Result Value Ref Range    Case Report       Gynecologic Cytology Report                       Case: [id number] Authorizing Provider:  Pepito Herring MD        Collected:           07/20/2021 1141              Ordering Location:     [facility] [place]    Received:            07/20/2021 1141                                     Area                                                                         First Screen:          Stephen Ayers                                                               Specimen:    LIQUID-BASED PAP, SCREENING, Cervix                                                        Primary Interpretation Negative for intraepithelial lesion or malignancy     Interpretation Shift in winsome suggestive of bacterial vaginosis     Specimen Adequacy Satisfactory for evaluation  (See note)     Note       No endocervical cells identified  It is difficult to differentiate between squamous metaplastic cells and parabasal cells in atrophic specimens due to numerous causes including menopause, postpartum changes and progestational agents  Scant cellularity  Additional Information       [employer] FDA approved ,  and ThinPrep Imaging Duo System are utilized with strict adherence to the 's instruction manual to prepare gynecologic and non-gynecologic cytology specimens for the production of ThinPrep slides as well as for gynecologic ThinPrep imaging  These processes have been validated by our laboratory and/or by the   The Pap test is not a diagnostic procedure and should not be used as the sole means to detect cervical cancer  It is only a screening procedure to aid in the detection of cervical cancer and its precursors  Both false-negative and false-positive results have been experienced  Your patient's test result should be interpreted in this context together with the history and clinical findings

## 2022-02-17 ENCOUNTER — TELEPHONE (OUTPATIENT)
Dept: GERIATRICS | Age: 87
End: 2022-02-17

## 2022-02-17 NOTE — TELEPHONE ENCOUNTER
Patient's spouse, Roberta Gibbons ([phone]) was calling about therapy that was discussed at the patient's initial consultation  Patient has not heard anything from [facility] (OUTPATIENT [facility])  I advised patient's spouse, Russel Gonzalez, that the referral was sent and provided him with [facility] phone #[phone] so he could follow up if he wanted to

## 2022-03-09 DIAGNOSIS — G93.0 CYST OF BRAIN: Primary | ICD-10-CM

## 2022-03-20 ENCOUNTER — HOSPITAL ENCOUNTER (OUTPATIENT)
Dept: MRI IMAGING | Facility: HOSPITAL | Age: 87
Discharge: HOME | End: 2022-03-20
Payer: MEDICARE

## 2022-03-20 DIAGNOSIS — G93.0 CYST OF BRAIN: ICD-10-CM

## 2022-03-20 PROCEDURE — G1004 CDSM NDSC: HCPCS

## 2022-03-20 PROCEDURE — A9585 GADOBUTROL INJECTION: HCPCS | Performed by: STUDENT IN AN ORGANIZED HEALTH CARE EDUCATION/TRAINING PROGRAM

## 2022-03-20 PROCEDURE — 70553 MRI BRAIN STEM W/O & W/DYE: CPT

## 2022-03-20 RX ADMIN — GADOBUTROL 9.5 ML: 604.72 INJECTION INTRAVENOUS at 14:17

## 2022-03-25 ENCOUNTER — TELEPHONE (OUTPATIENT)
Dept: GERIATRICS | Age: 87
End: 2022-03-25

## 2022-03-25 NOTE — TELEPHONE ENCOUNTER
----- Message from Francisco Javier Breaux MD sent at 3/25/2022 11:53 AM EDT -----    Please call the patient's daughter and let her know that the MRI shows a multi loculated cystic  lesion  The patient has an upcoming appointment where we can discuss further  Alternatively I can place a referral to Neurosurgery for consultation with them    Family can decide plan of care now or at upcoming appointment

## 2022-03-25 NOTE — TELEPHONE ENCOUNTER
Called pt daughter and relayed Dr Kellen Lorenzo message  Pt daughter stated she is ok with waiting for appt to discuss plan of care  Pt daughter would just like general information about what this means

## 2022-03-27 DIAGNOSIS — G93.0 CYST OF BRAIN: Primary | ICD-10-CM

## 2022-03-28 NOTE — TELEPHONE ENCOUNTER
MD Ruperto Pollock Ala  Kindly let the daughter know that my speciality is Geriatrics which is why I would recommend a referral to neurosurgery to address her questions as this is their field of study  At that appointment they can review the films and determine if this MRI finding needs to be worked up or just be monitored  For now, the report says its is not causing obstruction and is a multiloculated cyst  Referral placed to neurosurgery so they can consult with them and address their questions  They can decide if they wish to keep the appt with me as a follow up of her cognitive deficits       Called pt daughter and relayed Dr Nicolas Hurst message  Pt daughter confirmed and stated she will talk to pt  regarding information and give neurosurgery a call   Phone number was given to pt- daughter to call for scheduling [phone]

## 2022-04-29 ENCOUNTER — OFFICE VISIT (OUTPATIENT)
Dept: NEUROSURGERY | Facility: CLINIC | Age: 87
End: 2022-04-29
Payer: MEDICARE

## 2022-04-29 VITALS
BODY MASS INDEX: 35.68 KG/M2 | HEART RATE: 68 BPM | WEIGHT: 209 LBS | TEMPERATURE: 97.9 F | HEIGHT: 64 IN | RESPIRATION RATE: 18 BRPM | SYSTOLIC BLOOD PRESSURE: 130 MMHG | DIASTOLIC BLOOD PRESSURE: 70 MMHG

## 2022-04-29 DIAGNOSIS — G93.0 CYST OF BRAIN: ICD-10-CM

## 2022-04-29 PROCEDURE — 99204 OFFICE O/P NEW MOD 45 MIN: CPT | Performed by: NEUROLOGICAL SURGERY

## 2022-04-29 NOTE — PROGRESS NOTES
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  No obstructive hydrocephalus  Plan  · Reviewed imaging findings with pt and her family that showed the stable multiloculated cystic lesion within the lateral ventricles  · Discussed with pt the natural hx of brain cysts e g intraventricular arachnoid cysts  · Given that the cystic lesion is stable on imaging, pt's age and pt's comorbidities including Afib on Eliquis, surgical intervention is not recommended at this time  · Recommended continued surveillance  Discussed with pt options for follow up in 6 months vs 1 year, pt chose to follow up in 1 year  Pt will f/u in 1 year with repeat MRI brain w wo  · Patient made aware to contact neurosurgery with any questions or concerns in the interim  Diagnoses and all orders for this visit:    Cyst of brain  -     Ambulatory Referral to Neurosurgery  -     MRI brain with and without contrast; Future  -     BUN; Future  -     Creatinine, serum;  Future            CHIEF COMPLAINT    Chief Complaint   Patient presents with    Consult       HISTORY    History of Present Illness     80y o  year old female She reports that she has glaucoma  Patient is an 51-year-old female past medical history of atrial fibrillation on Eliquis, hypertension, history of glaucoma, history of stage III chronic kidney disease, hyperlipidemia and arthritis who presents to Neurosurgery office for consultation for multiloculated cystic lesion within the lateral ventricles  These findings were incidentally found during workup for mild cognitive impairment  Today patient denies having any headache, dizziness, lightheadedness, nausea, vomiting  Denies changes with her vision and hearing  Patient denies new numbness, tingling, or weakness in bilateral arms or legs  Patient's daughter and  who accompanied patient to this visit reported that patient has been having some memory issues as well as issues with speech  Particularly having word-finding difficulties  Patient's reports that she has longstanding issues with high gait  She reports that she has been using a rolling walker for several years  Patient reports that she has a history of prolapsed bladder and reports she has urinary frequency  She reports that occasionally she will have urinary leakage when she does not make it to the bathroom on time  She denies bowel dysfunction or issues  REVIEW OF SYSTEMS    Review of Systems   Constitutional: Negative  HENT: Negative  Eyes: Negative  H/o Glaucoma   Respiratory: Negative  Cardiovascular: Negative  H/o HTN/Hyperlipidemia   Gastrointestinal: Negative  Endocrine: Negative  H/o Stage 3 Kidney Disease   Genitourinary: Negative  Musculoskeletal: Negative  Skin: Negative  Allergic/Immunologic: Negative  Neurological: Positive for speech difficulty (slurred speech, in speech therapy)  6 3 CM BRAIN CYST   Hematological: Bruises/bleeds easily  On Xarelto   Psychiatric/Behavioral: Positive for confusion (forgetful at times)  All other systems reviewed and are negative          Meds/Allergies     Current Outpatient Medications   Medication Sig Dispense Refill    Acetaminophen 325 MG CAPS Take 650 mg by mouth every 6 (six) hours      ALPRAZolam (XANAX) 0 5 mg tablet Take by mouth in the morning        amLODIPine (NORVASC) 5 mg tablet Take 5 mg by mouth daily      apixaban (Eliquis) 5 mg Take 5 mg by mouth 2 (two) times a day        brimonidine (ALPHAGAN P) 0 1 % 1 drop in the morning Each eye        Calcium Citrate (CITRACAL PO) Take by mouth every morning      latanoprost (XALATAN) 0 005 % ophthalmic solution Administer 1 drop to both eyes daily at bedtime        Multiple Vitamins-Minerals (PRESERVISION AREDS 2+MULTI VIT PO) Take 2 tablets by mouth daily        multivitamin (THERAGRAN) TABS Take 1 tablet by mouth every morning        pravastatin (PRAVACHOL) 40 mg tablet Take 40 mg by mouth daily at bedtime      quinapril (ACCUPRIL) 10 mg tablet Take 20 mg by mouth daily        apixaban (ELIQUIS) 5 mg Take 5 mg by mouth 2 (two) times a day      ascorbic acid (VITAMIN C) 500 mg tablet Take 500 mg by mouth every morning (Patient not taking: Reported on 2/10/2022 )      CLINDAMYCIN HCL PO Take 600 mg by mouth as needed Prior to invasive procedures   (Patient not taking: Reported on 2/9/2022 )      clotrimazole-betamethasone (LOTRISONE) 1-0 05 % cream Apply topically 2 (two) times a day (Patient not taking: Reported on 9/21/2021) 30 g 0    Mirabegron ER 50 MG TB24 Take 1 tablet (50 mg total) by mouth daily at bedtime (Patient not taking: Reported on 9/21/2021) 30 tablet 9    omeprazole (PriLOSEC) 40 MG capsule Take 40 mg by mouth daily at bedtime (Patient not taking: Reported on 9/21/2021)      Polyethyl Glycol-Propyl Glycol (SYSTANE OP) Apply to eye 4 (four) times a day Each eye (Patient not taking: Reported on 9/21/2021)      Propylene Glycol (SYSTANE BALANCE) 0 6 % SOLN 4 (four) times a day Each eye       rivaroxaban (Xarelto) 15 mg tablet Take 15 mg by mouth daily with dinner   (Patient not taking: Reported on 2/10/2022 )       No current facility-administered medications for this visit         Allergies   Allergen Reactions    Hydrocodone-Acetaminophen Abdominal Pain    Erythromycin GI Intolerance     "stomach burning", bruises    Minocycline GI Intolerance     "stomach burning", bruises    Penicillins GI Intolerance     "burning stomach", bruising    Propoxyphene GI Intolerance    Tetracycline GI Intolerance     "stomach burning"-bruising    Vicodin [Hydrocodone-Acetaminophen] GI Intolerance     "stomach burning" ,nausea    Prednisone     Avelox [Moxifloxacin] GI Intolerance     Avoids d/t N/V    Percocet [Oxycodone-Acetaminophen] GI Intolerance     Avoids d/t N/V    Sulfa Antibiotics Rash       PAST HISTORY    Past Medical History:   Diagnosis Date    Arthritis     Borderline diabetes     watches diet    Diabetes mellitus ([facility] [address] )     Dry eye syndrome, bilateral     Full dentures     Gastric ulcer     dx in 9/2017    Glaucoma     had laser    History of mammogram 2018    Hyperlipidemia     controlled    Hypertension     controlled    Irregular heart beat 09/2017    "pre-mature" beat-saw cardiologist-12/17-Holter monitor 1/18    Ovarian cyst 07/06/2018    Papanicolaou smear for cervical cancer screening 06/05/2015    Neg     PONV (postoperative nausea and vomiting)     Presence of intracervical pessary     Wears glasses        Past Surgical History:   Procedure Laterality Date    APPENDECTOMY      age 6    COLONOSCOPY  09/13/2017    DILATION AND CURETTAGE OF UTERUS      EGD  01/22/2018    Ulcers healed    EGD AND COLONOSCOPY      ESOPHAGOGASTRIC FUNDOPLASTY  09/13/2017    JOINT REPLACEMENT Bilateral     knees    ROTATOR CUFF REPAIR Left     titanium screw    TONSILLECTOMY         Social History     Tobacco Use    Smoking status: Never Smoker    Smokeless tobacco: Never Used   Substance Use Topics    Alcohol use: Yes     Comment: occ    Drug use: No       Family History   Problem Relation Age of Onset    Cancer Mother         breast    Melanoma Mother         Malignant, internal melanoma     Heart disease Brother         CHF         Above history personally reviewed         EXAM    Vitals:Blood pressure 130/70, pulse 68, temperature 97 9 °F (36 6 °C), temperature source Temporal, resp  rate 18, height 5' 4" (1 626 m), weight 94 8 kg (209 lb)  ,Body mass index is 35 87 kg/m²  Physical Exam  Constitutional:       General: She is not in acute distress  Appearance: She is well-developed  Comments: Pt walking with a rolling walker  HENT:      Head: Normocephalic and atraumatic  Eyes:      Pupils: Pupils are equal, round, and reactive to light  Neck:      Trachea: No tracheal deviation  Cardiovascular:      Rate and Rhythm: Normal rate  Pulmonary:      Effort: Pulmonary effort is normal    Abdominal:      Palpations: Abdomen is soft  Tenderness: There is no abdominal tenderness  There is no guarding  Musculoskeletal:      Cervical back: Neck supple  Skin:     General: Skin is warm and dry  Coloration: Skin is not pale  Findings: No rash  Neurological:      Mental Status: She is alert and oriented to person, place, and time  Comments: GCS 15, Awake, Alert, Oriented x 3    Motor: EDWARDS, strength 4+/5 BUE, 4/5 BLE  Sensation:  intact to LTX 4     Reflexes: 2+ and symmetric, no honeycutt's or clonus     Coordination: no drift bilateral upper extremities   Psychiatric:         Behavior: Behavior normal          Neurologic Exam     Mental Status   Oriented to person, place, and time  Cranial Nerves     CN III, IV, VI   Pupils are equal, round, and reactive to light  MEDICAL DECISION MAKING    Imaging Studies:     I have personally reviewed pertinent reports     and I have personally reviewed pertinent films in PACS

## 2022-04-29 NOTE — ASSESSMENT & PLAN NOTE
Pt presents for consultation for cystic lesion within the lateral ventricles  Incidentally found during workup for mild cognitive impairment  · Imaging reviewed personally and by attending  Final results below discussed with the patient  · MRI brain w wo 03/20/2022: Stable multiloculated cystic lesion within the lateral ventricles with no associated enhancement or solid component  Differential considerations would include intraventricular arachnoid cysts  No obstructive hydrocephalus  Plan  · Reviewed imaging findings with pt and her family that showed the stable multiloculated cystic lesion within the lateral ventricles  · Discussed with pt the natural hx of brain cysts e g intraventricular arachnoid cysts  · Given that the cystic lesion is stable on imaging, pt's age and pt's comorbidities including Afib on Eliquis, surgical intervention is not recommended at this time  · Recommended continued surveillance  Discussed with pt options for follow up in 6 months vs 1 year, pt chose to follow up in 1 year  Pt will f/u in 1 year with repeat MRI brain w wo  · Patient made aware to contact neurosurgery with any questions or concerns in the interim

## 2022-05-03 ENCOUNTER — TELEMEDICINE (OUTPATIENT)
Dept: GERIATRICS | Age: 87
End: 2022-05-03
Payer: MEDICARE

## 2022-05-03 DIAGNOSIS — F03.90 MILD DEMENTIA (HCC): Primary | ICD-10-CM

## 2022-05-03 DIAGNOSIS — G93.0 CYST OF BRAIN: ICD-10-CM

## 2022-05-03 DIAGNOSIS — R26.81 GAIT INSTABILITY: ICD-10-CM

## 2022-05-03 PROCEDURE — 99213 OFFICE O/P EST LOW 20 MIN: CPT | Performed by: STUDENT IN AN ORGANIZED HEALTH CARE EDUCATION/TRAINING PROGRAM

## 2022-05-03 NOTE — PROGRESS NOTES
Virtual Regular Visit    Verification of patient location:    Patient is located in the following state in which I hold an active license [place]      Assessment/Plan:    Problem List Items Addressed This Visit        Nervous and Auditory    Mild dementia ([facility] [address] ) - Primary     Overall, no acute changes in cognition, mood or behaviors  The patient currently is enrolled in cognitive rehabilitation with speech therapy  Continue reorientation and redirection as needed  Manage chronic conditions  Maintain fall precautions  Encouraged patient to remain active mentally, physically and socially  Participate in cognitively stimulating exercises as able  Will plan to follow up with scheduled reassessment appointment         Cyst of brain     MRI previously revealed a multiloculated cystic lesion within the lateral ventricles  Patient was referred to neurosensory for consultation with recommendations for conservative management and continued surveillance with repeat MRI in 1 year  Family reassured              Other    Gait instability     Current enrolled in physical therapy for gait training, balance and strengthening  Maintain fall precautions                    Reason for visit is   Chief Complaint   Patient presents with    Virtual Brief Visit    Virtual Regular Visit        Encounter provider Susan Mccartney MD    Provider located at [address] [place] [address] [address] [place] [address]  [phone]      Recent Visits  Date Type Provider Dept   05/03/22 Treasure Tirado MD [address] [place] [address]   Showing recent visits within past 7 days and meeting all other requirements  Future Appointments  No visits were found meeting these conditions  Showing future appointments within next 150 days and meeting all other requirements       The patient was identified by name and date of birth   [facility] was informed that this is a telemedicine visit and that the visit is being conducted through [employer] and patient was informed that this is a secure, HIPAA-compliant platform  She agrees to proceed     My office door was closed  No one else was in the room  She acknowledged consent and understanding of privacy and security of the video platform  The patient has agreed to participate and understands they can discontinue the visit at any time  Patient is aware this is a billable service  Subjective  Atul Siddiqui is a 80 y o  female presents for follow-up of dementia and abnormal brain MRI   HPI     Patient presents for follow-up of dementia and abnormal MRI of the brain  During her initial neurocognitive assessment workup, the patient's MRI brain was noted to have an incidental finding of a cystic lesion  A repeat MRI with contrast was completed which confirmed a multiloculated cyst for which the patient was referred to Neurosurgery for consultation  Per medical records, plan per Neurosurgery is for conservative management with continued surveillance and repeat MRI in 1 year  Patient and family were reassured  Since prior care conference, no acute changes in cognition, mood or behaviors per family  The patient is currently enrolled in speech therapy for cognitive rehab and physical therapy for gait training and strengthening which has been keeping her busy  Family has also been attempting to ensure the patient remains socially active  No cardiorespiratory distress, fever, chills, URI, urinary symptoms or recent falls  The patient has been tolerating oral intake, sleeping well and having regular bowel movements  Will plan to follow up for reassessment in August as scheduled          Past Medical History:   Diagnosis Date    Arthritis     Borderline diabetes     watches diet    Diabetes mellitus ([facility] [address] )     Dry eye syndrome, bilateral     Full dentures     Gastric ulcer     dx in 9/2017    Glaucoma     had laser    History of mammogram 2018    Hyperlipidemia     controlled    Hypertension     controlled    Irregular heart beat 09/2017    "pre-mature" beat-saw cardiologist-12/17-Holter monitor 1/18    Ovarian cyst 07/06/2018    Papanicolaou smear for cervical cancer screening 06/05/2015    Neg     PONV (postoperative nausea and vomiting)     Presence of intracervical pessary     Wears glasses        Past Surgical History:   Procedure Laterality Date    APPENDECTOMY      age 6    COLONOSCOPY  09/13/2017    DILATION AND CURETTAGE OF UTERUS      EGD  01/22/2018    Ulcers healed    EGD AND COLONOSCOPY      ESOPHAGOGASTRIC FUNDOPLASTY  09/13/2017    JOINT REPLACEMENT Bilateral     knees    ROTATOR CUFF REPAIR Left     titanium screw    TONSILLECTOMY         Current Outpatient Medications   Medication Sig Dispense Refill    Acetaminophen 325 MG CAPS Take 650 mg by mouth every 6 (six) hours      ALPRAZolam (XANAX) 0 5 mg tablet Take by mouth in the morning        amLODIPine (NORVASC) 5 mg tablet Take 5 mg by mouth daily      apixaban (Eliquis) 5 mg Take 5 mg by mouth 2 (two) times a day        brimonidine (ALPHAGAN P) 0 1 % 1 drop in the morning Each eye        Calcium Citrate (CITRACAL PO) Take by mouth every morning      latanoprost (XALATAN) 0 005 % ophthalmic solution Administer 1 drop to both eyes daily at bedtime        Multiple Vitamins-Minerals (PRESERVISION AREDS 2+MULTI VIT PO) Take 2 tablets by mouth daily        multivitamin (THERAGRAN) TABS Take 1 tablet by mouth every morning        pravastatin (PRAVACHOL) 40 mg tablet Take 40 mg by mouth daily at bedtime      Propylene Glycol (SYSTANE BALANCE) 0 6 % SOLN 4 (four) times a day Each eye       quinapril (ACCUPRIL) 10 mg tablet Take 20 mg by mouth daily        apixaban (ELIQUIS) 5 mg Take 5 mg by mouth 2 (two) times a day      ascorbic acid (VITAMIN C) 500 mg tablet Take 500 mg by mouth every morning (Patient not taking: Reported on 2/10/2022 )      CLINDAMYCIN HCL PO Take 600 mg by mouth as needed Prior to invasive procedures   (Patient not taking: Reported on 2/9/2022 )      clotrimazole-betamethasone (LOTRISONE) 1-0 05 % cream Apply topically 2 (two) times a day (Patient not taking: Reported on 9/21/2021) 30 g 0    Mirabegron ER 50 MG TB24 Take 1 tablet (50 mg total) by mouth daily at bedtime (Patient not taking: Reported on 9/21/2021) 30 tablet 9    omeprazole (PriLOSEC) 40 MG capsule Take 40 mg by mouth daily at bedtime (Patient not taking: Reported on 9/21/2021)      Polyethyl Glycol-Propyl Glycol (SYSTANE OP) Apply to eye 4 (four) times a day Each eye (Patient not taking: Reported on 9/21/2021)      rivaroxaban (Xarelto) 15 mg tablet Take 15 mg by mouth daily with dinner   (Patient not taking: Reported on 2/10/2022 )       No current facility-administered medications for this visit  Allergies   Allergen Reactions    Hydrocodone-Acetaminophen Abdominal Pain    Erythromycin GI Intolerance     "stomach burning", bruises    Minocycline GI Intolerance     "stomach burning", bruises    Penicillins GI Intolerance     "burning stomach", bruising    Propoxyphene GI Intolerance    Tetracycline GI Intolerance     "stomach burning"-bruising    Vicodin [Hydrocodone-Acetaminophen] GI Intolerance     "stomach burning" ,nausea    Prednisone     Avelox [Moxifloxacin] GI Intolerance     Avoids d/t N/V    Percocet [Oxycodone-Acetaminophen] GI Intolerance     Avoids d/t N/V    Sulfa Antibiotics Rash       Review of Systems   Constitutional: Positive for activity change and fatigue (Chronic)  HENT: Negative for trouble swallowing  Respiratory: Negative for cough and shortness of breath  Cardiovascular: Negative for chest pain  Gastrointestinal: Negative for abdominal pain  Genitourinary: Positive for frequency (Chronic)  Negative for decreased urine volume  Musculoskeletal: Positive for gait problem     Psychiatric/Behavioral: Positive for decreased concentration  Negative for behavioral problems, confusion, dysphoric mood, sleep disturbance and suicidal ideas  The patient is not nervous/anxious  Video Exam    There were no vitals filed for this visit  Physical Exam  Constitutional:       General: She is not in acute distress  Appearance: Normal appearance  She is not ill-appearing or diaphoretic  HENT:      Head: Normocephalic and atraumatic  Right Ear: External ear normal       Left Ear: External ear normal       Mouth/Throat:      Mouth: Mucous membranes are moist    Eyes:      General:         Right eye: No discharge  Left eye: No discharge  Conjunctiva/sclera: Conjunctivae normal    Pulmonary:      Effort: Pulmonary effort is normal  No respiratory distress  Abdominal:      General: There is no distension  Palpations: Abdomen is soft  Tenderness: There is no abdominal tenderness  Musculoskeletal:         General: Normal range of motion  Cervical back: Normal range of motion  Skin:     General: Skin is dry  Neurological:      General: No focal deficit present  Mental Status: She is alert and oriented to person, place, and time  Mental status is at baseline  Gait: Gait abnormal    Psychiatric:         Mood and Affect: Mood normal           I spent 23 minutes directly with the patient during this visit    VIRTUAL VISIT DISCLAIMER      Maria Luisa Gomez verbally agrees to participate in [employer]  Pt is aware that [employer] could be limited without vital signs or the ability to perform a full hands-on physical Latasha Lacks understands she or the provider may request at any time to terminate the video visit and request the patient to seek care or treatment in person

## 2022-05-04 NOTE — ASSESSMENT & PLAN NOTE
Overall, no acute changes in cognition, mood or behaviors  The patient currently is enrolled in cognitive rehabilitation with speech therapy    Continue reorientation and redirection as needed  Manage chronic conditions  Maintain fall precautions  Encouraged patient to remain active mentally, physically and socially  Participate in cognitively stimulating exercises as able  Will plan to follow up with scheduled reassessment appointment

## 2022-05-04 NOTE — ASSESSMENT & PLAN NOTE
MRI previously revealed a multiloculated cystic lesion within the lateral ventricles    Patient was referred to neurosensory for consultation with recommendations for conservative management and continued surveillance with repeat MRI in 1 year  Family reassured

## 2022-05-04 NOTE — ASSESSMENT & PLAN NOTE
Current enrolled in physical therapy for gait training, balance and strengthening  Maintain fall precautions

## 2022-08-05 LAB
CREAT ?TM UR-SCNC: 78.8 UMOL/L
EXT MICROALBUMIN URINE RANDOM: 2.5
MICROALBUMIN/CREAT UR: 31.7 MG/G{CREAT}

## 2022-08-08 ENCOUNTER — OFFICE VISIT (OUTPATIENT)
Dept: NEPHROLOGY | Facility: CLINIC | Age: 87
End: 2022-08-08
Payer: MEDICARE

## 2022-08-08 VITALS
HEIGHT: 64 IN | WEIGHT: 205 LBS | SYSTOLIC BLOOD PRESSURE: 122 MMHG | BODY MASS INDEX: 35 KG/M2 | DIASTOLIC BLOOD PRESSURE: 68 MMHG

## 2022-08-08 DIAGNOSIS — E11.22 TYPE 2 DIABETES MELLITUS WITH STAGE 3 CHRONIC KIDNEY DISEASE AND HYPERTENSION (HCC): Primary | ICD-10-CM

## 2022-08-08 DIAGNOSIS — I12.9 TYPE 2 DIABETES MELLITUS WITH STAGE 3 CHRONIC KIDNEY DISEASE AND HYPERTENSION (HCC): Primary | ICD-10-CM

## 2022-08-08 DIAGNOSIS — N18.30 BENIGN HYPERTENSION WITH CKD (CHRONIC KIDNEY DISEASE) STAGE III (HCC): ICD-10-CM

## 2022-08-08 DIAGNOSIS — I12.9 BENIGN HYPERTENSION WITH CKD (CHRONIC KIDNEY DISEASE) STAGE III (HCC): ICD-10-CM

## 2022-08-08 DIAGNOSIS — N18.30 TYPE 2 DIABETES MELLITUS WITH STAGE 3 CHRONIC KIDNEY DISEASE AND HYPERTENSION (HCC): Primary | ICD-10-CM

## 2022-08-08 DIAGNOSIS — I10 PRIMARY HYPERTENSION: ICD-10-CM

## 2022-08-08 PROCEDURE — 99213 OFFICE O/P EST LOW 20 MIN: CPT | Performed by: INTERNAL MEDICINE

## 2022-08-08 NOTE — PROGRESS NOTES
NEPHROLOGY OFFICE VISIT   Talib Phoenix 80 y o  female MRN: [id number]  2022    Reason for Visit:  Chronic kidney disease    History of Present Illness (HPI):    I had the pleasure of seeing Isidra Thornton today in the renal clinic for the continued management of kidney disease  Since our last visit, there has been no ER visits or hospitilizations  He currently has no complaints at this time and is feeling well  Patient denies any chest pain, shortness of breath and swelling  The last blood work was done on yesterday  I try to find the results under media, along with also checking Care everywhere for the blood work results  Blood work was done at [facility]  I will have 1 of our office staff call and which she the results and then I will give her a call later to discuss the results  Otherwise she has no new complaints  MRI from February reviewed and appear to be quite stable  ASSESSMENT and PLAN:    Chronic Kidney Disease Stage IIIB  --Imagin renal ultrasound reviewed  Kidneys were noted to be symmetrical and normal size  Both kidneys were diffusely echogenic consistent with underlying chronic kidney disease  There was numerous of simple cyst on the left kidney  And there was a right upper pole renal cyst about 4 1 cm that was Bosniak 2, also on the left kidney which is usually a benign  Will check a repeat renal ultrasound, there pelvic cyst which was followed up by a pelvic ultrasound shortly after  --Urinalysis:  Did not provide a urine specimen in the office    Will check a urinalysis at the next visit  --Proteinuria:  Screen for microalbuminuria  --Baseline creatinine: Low 1's, 1 1-1 4 mg/dL more recently  --Etiology:  Age-related nephron loss, hypertensive nephrosclerosis  --Biopsy Proven:  No  --Status:  Stable since 2018, follow-up most recent blood  --Management:  follow-up most recent blood work  --Reducing Cardiovascular Risk Factors:  Simvastatin+ Ezetimibe reduced atherosclerotic events in CKD (SHARP trial); Low dose aspirin safe (if no contraindications exist); smoking is an independent risk factor for Chronic Kidney Disease and progression, strongly recommend smoking cessation     Brain cyst  --following with Neurosurgery  --MRI reviewed and showed stable multiloculated cystic lesion with no associated enhancement or solid component  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   No evidence of obstructive hydrocephalus  --clinically asymptomatic     Pre diabetes  --exercise diet and weight loss  --not on any medications     Hypertension  --amlodipine 5 mg daily, quinapril 20 mg daily  --blood pressure at target     Paroxysmal atrial fibrillation  --management as per Cardiology  --on anticoagulation with Eliquis     Anticoagulation in chronic kidney disease  --medication:  Eliquis 5 mg twice a day  --GFR/Creatinine Clearance: > 35 mL/min  --Indication:  Atrial fibrillation  --recommendation:  See below  --risk of major bleeding increases as level of renal dysfunction worsens, especially for eGFR < 30 cc/min  --non vitamin K oral anticoagulatants shown to be equal equivalent or superior efficacy and safety in comparison to warfarin in the general population (except for patients with mechanical valves)  However no data on the use of these agents in patients with eGFR < 30 cc/min  Non vitamin K anticoagulants and enoxaparin, need to be dose adjusted or voided in moderate Chronic Kidney Disease because they are dependent on the kidneys were elimination  Heparin warfarin are not dependent on the kidney for clearance and does require no dose adjustments in Chronic Kidney Disease    --Warfarin (Coumadin)                Renal elimination: none (hepatic clearance)                Dialysis removal: < 1% (4 hour treatment)                Dosage Recommendation:  Titrate per INR                Preop management:  Would recommend holding 3-5 days regardless of GFR, depending on the indication  --Apixaban (Eliquis)                Renal elimination:  Approximately 27%                Dialysis removal:  7% (4 hour treatment)                Dosage recommendation: GFR 30-50 cc/min 2 5 mg BID, dosage for atrial fibrillation, serum creatinine greater than 1 5 mg/dL and either age greater than 80 years or body weight  less than 60 kg 2 5 mg twice daily  Otherwise 5 mg twice daily   Dosage for DVT:  Avoid use of GFR less than 30 cc/minute  No dose adjustment of GFR greater than 30 cc/minute                  Preoperative management:  Hold for 24-48 hours for GFR greater than 30 cc/min, 36-48 hours for GFR 15-29 cc/minute; 96 hours for GFR less than 15 cc/minute                Reversal:  Somewhat reversible with 4-Factor prothrombin complex concentrate (Andexanet fabby is the reversal agent)  --Rivaroxaban (Xarelto)                Renal elimination:  35%                Dialysis removal: < 1% (4 hour treatment)                Dosage recommendation:  GFR between 30-50 cc/minute:  15 mg daily; GFR less than 30 cc/minute:  Avoid                Preoperative management:  Hold for 24-48 hours for GFR greater than 30 cc/minute; 36-48 hours for GFR between 15 and 29 cc/minute; 96 hours for GFR less than 15 cc/minute                Reversal:  Somewhat reversible with 4-factor prothrombin complex concentrate (Andexanet fabby is a reversal agent)  --Dabigatran (Pradaxa)                Renal elimination:  80%                Dialysis removal:  50% (4 hour treatment), very dialyzable                Dose recommendation:  GFR between 30-50 cc/minute:  75 mg twice a day ; avoid the use if GFR less than 30 cc/minute                Preoperative management:  Hold for 24-48 hours for GFR greater than 60 cc/minute; 48-96 hours for GFR between 15-60 cc/minute;  hours for GFR less than 15 cc/minute                Reversal: Idarucizumab, neutralize is the anticoagulant effect of data get trend in 30 minutes; 6% risk of thrombotic complication  --Edoxaban (Savaysa)                Renal elimination:  50%                Dialysis removal: 9% (4 hour treatment)                Dosage recommendations:  GFR between 30-50 cc/minute 30 mg daily ; avoid the use of GFR less than 30 cc/minute  --for hemodialysis patient started on anticoagulation recommend reducing their intra dialytic heparin dose by at least 50%  --life-threatening bleeding:  Desmopressin IV 0 4 micrograms/kilogram over 10 minutes if there is a concern for uremic bleeding         PATIENT INSTRUCTIONS:    Patient Instructions   1 )  Low 2 g sodium diet    2 )  Monitor weights at home    3 )  Avoid NSAIDs (ibuprofen, Motrin, Advil, Aleve, naproxen)    4 )  Monitor blood pressure at home, call if blood pressure greater than 150/90 persistently    5 ) I will plan to discuss all results including blood work, and/or imaging at our next visit, unless there is an urgent indication, in which case I will call you earlier  If you have any questions or concerns about your results, please feel free to call our office  Orders Placed This Encounter   Procedures    Cystatin C With eGFR     Standing Status:   Future     Standing Expiration Date:   8/8/2023    Comprehensive metabolic panel     This is a patient instruction: Patient fasting for 8 hours or longer recommended  Standing Status:   Future     Standing Expiration Date:   8/8/2023    CBC     Standing Status:   Future     Standing Expiration Date:   8/8/2023    Microalbumin / creatinine urine ratio     Standing Status:   Future     Standing Expiration Date:   8/8/2023       OBJECTIVE:  Current Weight: Weight - Scale: 93 kg (205 lb)  Vitals:    08/08/22 1325 08/08/22 1338   BP:  122/68   Weight: 93 kg (205 lb)    Height: 5' 4" (1 626 m)     Body mass index is 35 19 kg/m²  REVIEW OF SYSTEMS:    Review of Systems   Constitutional: Negative for activity change and fever     Respiratory: Negative for cough, chest tightness, shortness of breath and wheezing  Cardiovascular: Negative for chest pain and leg swelling  Gastrointestinal: Negative for abdominal pain, diarrhea, nausea and vomiting  Endocrine: Negative for polyuria  Genitourinary: Negative for difficulty urinating, dysuria, flank pain, frequency and urgency  Skin: Negative for rash  Neurological: Negative for dizziness, syncope, light-headedness and headaches  PHYSICAL EXAM:      Physical Exam  Vitals and nursing note reviewed  Exam conducted with a chaperone present  Constitutional:       General: She is not in acute distress  Appearance: She is well-developed  She is not diaphoretic  HENT:      Head: Normocephalic and atraumatic  Eyes:      General: No scleral icterus  Pupils: Pupils are equal, round, and reactive to light  Cardiovascular:      Rate and Rhythm: Normal rate and regular rhythm  Heart sounds: Normal heart sounds  No murmur heard  No friction rub  No gallop  Pulmonary:      Effort: Pulmonary effort is normal  No respiratory distress  Breath sounds: Normal breath sounds  No wheezing or rales  Chest:      Chest wall: No tenderness  Abdominal:      General: Bowel sounds are normal  There is no distension  Palpations: Abdomen is soft  Tenderness: There is no abdominal tenderness  There is no rebound  Musculoskeletal:         General: Normal range of motion  Cervical back: Normal range of motion and neck supple  Skin:     Findings: No rash  Neurological:      Mental Status: She is alert and oriented to person, place, and time           Medications:    Current Outpatient Medications:     Acetaminophen 325 MG CAPS, Take 650 mg by mouth every 6 (six) hours, Disp: , Rfl:     ALPRAZolam (XANAX) 0 5 mg tablet, Take by mouth in the morning  , Disp: , Rfl:     amLODIPine (NORVASC) 5 mg tablet, Take 5 mg by mouth daily, Disp: , Rfl:     apixaban (ELIQUIS) 5 mg, Take 5 mg by mouth 2 (two) times a day  , Disp: , Rfl:     brimonidine (ALPHAGAN P) 0 1 %, 1 drop in the morning Each eye  , Disp: , Rfl:     Calcium Citrate (CITRACAL PO), Take by mouth every morning, Disp: , Rfl:     latanoprost (XALATAN) 0 005 % ophthalmic solution, Administer 1 drop to both eyes daily at bedtime  , Disp: , Rfl:     Multiple Vitamins-Minerals (PRESERVISION AREDS 2+MULTI VIT PO), Take 2 tablets by mouth daily  , Disp: , Rfl:     multivitamin (THERAGRAN) TABS, Take 1 tablet by mouth every morning  , Disp: , Rfl:     pravastatin (PRAVACHOL) 40 mg tablet, Take 40 mg by mouth daily at bedtime, Disp: , Rfl:     Propylene Glycol 0 6 % SOLN, 4 (four) times a day Each eye , Disp: , Rfl:     quinapril (ACCUPRIL) 10 mg tablet, Take 20 mg by mouth daily  , Disp: , Rfl:     apixaban (ELIQUIS) 5 mg, Take 5 mg by mouth 2 (two) times a day, Disp: , Rfl:     ascorbic acid (VITAMIN C) 500 mg tablet, Take 500 mg by mouth every morning (Patient not taking: Reported on 2/10/2022 ), Disp: , Rfl:     CLINDAMYCIN HCL PO, Take 600 mg by mouth as needed Prior to invasive procedures   (Patient not taking: No sig reported), Disp: , Rfl:     clotrimazole-betamethasone (LOTRISONE) 1-0 05 % cream, Apply topically 2 (two) times a day (Patient not taking: Reported on 9/21/2021), Disp: 30 g, Rfl: 0    Mirabegron ER 50 MG TB24, Take 1 tablet (50 mg total) by mouth daily at bedtime (Patient not taking: Reported on 9/21/2021), Disp: 30 tablet, Rfl: 9    omeprazole (PriLOSEC) 40 MG capsule, Take 40 mg by mouth daily at bedtime (Patient not taking: Reported on 9/21/2021), Disp: , Rfl:     Polyethyl Glycol-Propyl Glycol (SYSTANE OP), Apply to eye 4 (four) times a day Each eye (Patient not taking: Reported on 9/21/2021), Disp: , Rfl:     rivaroxaban (Xarelto) 15 mg tablet, Take 15 mg by mouth daily with dinner   (Patient not taking: Reported on 2/10/2022 ), Disp: , Rfl:     Laboratory Results:        Invalid input(s): ALBUMIN    Results for orders placed or performed in visit on 07/20/21   Tissue Exam   Result Value Ref Range    Case Report       Surgical Pathology Report                         Case: [id number]                                   Authorizing Provider:  Ivan Ruffin MD        Collected:           07/20/2021 1141              Ordering Location:     [address] [place] [address] OB/GYN [facility]    Received:            07/20/2021 1141                                     Area                                                                         Pathologist:           Day Alberts MD                                                                  Specimen:    Endometrium                                                                                Final Diagnosis       A  Endometrium, biopsy:  - Granulation tissue and detached benign squamous epithelium, compatible with uterine prolapse  - No endometrium present  Comment: Clinical correlation recommended, Re-biopsy as clinically indicated  Microscopic Description       Immunohistochemistry for pan cytokeratin AE1/AE3 highlights benign squamous cells  Additional Information       All reported additional testing was performed with appropriately reactive controls  These tests were developed and their performance characteristics determined by [facility] or appropriate performing facility, though some tests may be performed on tissues which have not been validated for performance characteristics (such as staining performed on alcohol exposed cell blocks and decalcified tissues)  Results should be interpreted with caution and in the context of the patients clinical condition  These tests may not be cleared or approved by the [place]  Food and Drug Administration, though the FDA has determined that such clearance or approval is not necessary  These tests are used for clinical purposes and they should not be regarded as investigational or for research   This laboratory has been approved by [facility], designated as a high-complexity laboratory and is qualified to perform these tests  Interpretation performed at [facility], [address] [address]      Gross Description          A  The specimen is received in formalin, labeled with the patient's name and hospital number, and is designated " endometrium "  The specimen consists of multiple colorless, friable irregularly-shaped soft tissue fragments measuring in aggregate of 0 5 x 0 4 x 0 1 cm  The specimen is drained into an embedding bag  Entirely submitted  One cassette  Note: The estimated total formalin fixation time based upon information provided by the submitting clinician and the standard processing schedule is under 72 hours  Lilliana Mary          Liquid-based pap, screening   Result Value Ref Range    Case Report       Gynecologic Cytology Report                       Case: [id number]                                  Authorizing Provider:  Sherita May MD        Collected:           07/20/2021 1141              Ordering Location:     [address] [place] [address] OB/GYN [facility]    Received:            07/20/2021 1141                                     Area                                                                         First Screen:          Fern Garcia                                                               Specimen:    LIQUID-BASED PAP, SCREENING, Cervix                                                        Primary Interpretation Negative for intraepithelial lesion or malignancy     Interpretation Shift in winsome suggestive of bacterial vaginosis     Specimen Adequacy Satisfactory for evaluation  (See note)     Note       No endocervical cells identified  It is difficult to differentiate between squamous metaplastic cells and parabasal cells in atrophic specimens due to numerous causes including menopause, postpartum changes and progestational agents  Scant cellularity        Additional Information       Medical Referral Source's FDA approved ,  and ThinPrep Imaging Duo System are utilized with strict adherence to the 's instruction manual to prepare gynecologic and non-gynecologic cytology specimens for the production of ThinPrep slides as well as for gynecologic ThinPrep imaging  These processes have been validated by our laboratory and/or by the   The Pap test is not a diagnostic procedure and should not be used as the sole means to detect cervical cancer  It is only a screening procedure to aid in the detection of cervical cancer and its precursors  Both false-negative and false-positive results have been experienced  Your patient's test result should be interpreted in this context together with the history and clinical findings

## 2022-08-10 ENCOUNTER — OFFICE VISIT (OUTPATIENT)
Dept: GERIATRICS | Age: 87
End: 2022-08-10
Payer: MEDICARE

## 2022-08-10 VITALS
OXYGEN SATURATION: 97 % | HEIGHT: 64 IN | BODY MASS INDEX: 35.07 KG/M2 | RESPIRATION RATE: 16 BRPM | SYSTOLIC BLOOD PRESSURE: 124 MMHG | DIASTOLIC BLOOD PRESSURE: 70 MMHG | HEART RATE: 72 BPM | WEIGHT: 205.4 LBS | TEMPERATURE: 98.2 F

## 2022-08-10 DIAGNOSIS — I12.9 BENIGN HYPERTENSION WITH CKD (CHRONIC KIDNEY DISEASE) STAGE III (HCC): ICD-10-CM

## 2022-08-10 DIAGNOSIS — G93.0 CYST OF BRAIN: ICD-10-CM

## 2022-08-10 DIAGNOSIS — F03.A0 MILD DEMENTIA: Primary | ICD-10-CM

## 2022-08-10 DIAGNOSIS — N18.30 BENIGN HYPERTENSION WITH CKD (CHRONIC KIDNEY DISEASE) STAGE III (HCC): ICD-10-CM

## 2022-08-10 DIAGNOSIS — E78.49 OTHER HYPERLIPIDEMIA: ICD-10-CM

## 2022-08-10 DIAGNOSIS — F41.9 ANXIETY: ICD-10-CM

## 2022-08-10 DIAGNOSIS — R26.81 GAIT INSTABILITY: ICD-10-CM

## 2022-08-10 DIAGNOSIS — I48.91 ATRIAL FIBRILLATION, UNSPECIFIED TYPE (HCC): ICD-10-CM

## 2022-08-10 PROCEDURE — 99483 ASSMT & CARE PLN PT COG IMP: CPT | Performed by: STUDENT IN AN ORGANIZED HEALTH CARE EDUCATION/TRAINING PROGRAM

## 2022-08-10 RX ORDER — CHOLECALCIFEROL (VITAMIN D3) 1250 MCG
CAPSULE ORAL
COMMUNITY

## 2022-08-10 RX ORDER — ASCORBIC ACID 125 MG
TABLET,CHEWABLE ORAL
COMMUNITY

## 2022-08-10 NOTE — PROGRESS NOTES
[facility]  [address]  [place], [address] [facility] [place]  [phone]    Social Work Follow-Up    LCSW met with Meche Sanchez for follow up visit  Completed [place] Cognitive Assessment, score 18/30 (previously 23/30 in January 2022), and Geriatric Depression Screen, 1/15 (previously 2/15 in January 2022)  LCSW also met briefly with Joyce's  and daughter  Provided information on [facility] and monthly Alzheimer's [employer] caregiver support group (virtual currently)  Des Cognitive Assessment (MoCA) Version 8 2  Education: High School    Points Earned POSSIBLE Points   Visuospatial/Executive   Alternating [place] Making 0 1   Visuoconstructional skills 0 1   Visuoconstructional skills (clock) 2 3   Naming   Naming Animals 3 3   Attention   Digit Span 2 2   Vigilance (letters) 1 1   Serial 7 subtraction 1 3   Language   Sentence Repetition 1 2   Verbal fluency 0 1   Abstraction   Abstraction (word pairings) 0 2   Delayed recall   Delayed recall 1 5   Memory index score: 10/15   Orientation   Orientation 6 6   TOTAL SCORE: 18/30  (Normal ?26/30)   Additional notes:      LCSW to remain available as needed

## 2022-08-10 NOTE — PROGRESS NOTES
ASSESSMENT AND PLAN:  1  Mild dementia ([facility] [address] )  Assessment & Plan:  MOCA 18/30 (prev 23/30), GDS 1, TUGT 14 sec  Patient with significant deficits in visual spatial, executive function, attention, language, abstraction and delayed recall  Given history, physical exam above neurocognitive screening, patient continues at a level of mild dementia  Patient with notable decline in the office today as she significant word-finding difficulty, was repetitive and required frequent reorientation redirection  The patient remains extremely sedentary at home and does not participate in any cognitively stimulating exercises  Discussed in detail with patient and family, however patient continues to refuse restarting cognitive therapy or participating in an additional cognitive and physical activity  Encourage family to ensure patient remains social  Recommend the memory cafe for positive socialization  Consider senior  programs in the future for positive socialization, physical and cognitive stimulation  Discussed pharmacotherapy options, family declines trialing Aricept  Consider blister packaging for ease of medication administration  Recommend a falls Alert device as a safety precaution  Discussed with  should he need any additional services to call our MSW  Consider our caregiver support group for family members  Continue routine follow-up with neurosurgery given history of brain cyst  Reorientation and redirection as needed  Manage chronic conditions  Maintain Falls precautions  Encourage patient to remain active mentally, physically and socially  Participate in cognitively stimulating exercises as able    Orders:  -     Ambulatory Referral to Speech Therapy; Future    2   Anxiety  Assessment & Plan:  Patient continues on alprazolam  Discuss states again side effects of chronic benzodiazepine use which may contribute to memory loss, confusion, amnesia, dysarthria  Weaning schedule given to patient's family  Start lorazepam 0 5 mg alternating with 0 25 mg 3 weeks then decreased to 0 25 mg daily for an additional 3 weeks  Will follow up with a medication check in that time  Continue social support by family and friends      3  Atrial fibrillation, unspecified type [facility])  Assessment & Plan:  No cardiorespiratory distress  Heart rate remains controlled  Continue Eliquis  Follow-up with Cardiology as scheduled      4  Benign hypertension with CKD (chronic kidney disease) stage III (HCC)  Assessment & Plan:  /70   Controlled   Continue Accupril 20mg daily  Healthy lifestyle changes      5  Gait instability  Assessment & Plan:  TUGT 14 sec  Previously completed physical therapy for gait training, balance and strengthening  Patient remains very sedentary with a fear of falling any further referrals therapy  Maintain Falls precautions      6  Other hyperlipidemia  Assessment & Plan:  Maintained on pravastatin 40 mg daily  Continue healthy lifestyle changes      7  Cyst of brain  Assessment & Plan:  Was seen by Neurosurgery recommendation for no intervention at this time  Patient is scheduled for 1 year repeat MRI  Follow-up with neurosurgery as scheduled        Decision-making capacity:  The patient should not make any independent medical or financial decisions without the presence of her POA as assessed during this visit    Staging:  Mild dementia, FAST stage 4    Medications Review:  Discussed side effects of chronic benzodiazepine use  Weaning schedule given to family      HPI:    We had the pleasure of re-evaluating Shilpa Alexander who is a 80 y o  female in Geriatric follow up today  Ms Pawan James is in the office with her daughter and   Family explain over the past few months, the patient returned to being very sedentary again  after completing cognitive as well as physical therapy    The patient does continue to have a fear of falling and will ambulate in the house however declines going outdoors to ambulate  The patient typically will sponge bathe and remains independent in most ADLs but dependent in some IADLs  She does awaken at nighttime to urinate frequently     The patient does continue to cook however often is forgetful about recipes  She did have 1 episode of burning the food however she was distracted as there were visitors in the home  No recurrent episodes or additional safety concerns  The patient does continue to assist with the finances however  has noted she has been making mistakes as a result of which he will double check all payments  No overt changes in mood, personality or behaviors  I did have a detailed conversation with patient's family and patient about chronic benzodiazepine use  Family in agreement to trial weaning the patient off it  Weaning schedule given for 6 weeks after which we will follow-up with a medication check in  No cardiorespiratory distress, fever, chills, URI, urinary symptoms or recent falls  The patient has been tolerating oral intake, sleeping well and having regular bowel movements  During the office visit today, the patient had significant word-finding difficulty and was repetitive          HISTORY AS PER daughter,     COGNITION:  Memory Issues noticed since 2-3 years    Memory affected: short term memory loss    Symptoms started: gradual  Over time the memory has:  worsened  Memory issue(s) were noted by: family   Difficulty finding the right word while speaking: Yes  Requires repeat information or asking the same question repeatedly: Yes  Fluctuation in alertness: No  Changes in mood or personality:Yes  Current or previous treatment for depression or anxiety: Yes    Family member with dementia and what type? no  History of head trauma: No  History of stroke: No  History of alcohol or substance abuse: No    FUNCTIONAL STATUS:  BADLs  Does patient require assistance with:  Bathing: No  Dressing: No  Transferring: No  Continence: No  Toileting: No  Feeding: No    IADLs  Dose patient require assistance with:  Telephone: Yes  Shopping: Yes  Food Preparation: No  Housekeeping: Yes  Laundry: Yes  Transportation: Yes  Medications: No  Finances: Yes    NEUROPSYCH SYMPTOMS:  Does patient get angry or hostile? Resist care from others? No  Does patient see or hear things that no one else can see or hear? No  Does patient act impatient and cranky? Does mood frequently change for no apparent reason? Yes  Does patient act suspicious without good reason (example: believes that others are stealing from him or her, or planning to harm him or her in some way)? No  Does patient less interested in his or her usual activities or in the activities and plans of others? Yes  Does patient have trouble sleeping at night? No  Dose patient have abnormal movements while asleep? No    SAFETY:  Hearing and vision issue: No  Any gait or balance disorder: Yes  Uses: walker  Any falls in the last year: No  Any history of wandering: No  Are there firearms or guns in the home: No  Does patient drive: No  Any driving accidents or citations in the last year: No  Any concerns about patient's ability to drive: No    ACP REVIEW:  Does patient have POA: Yes  Does patient have a Living will: Yes  Any legal assistance needed for healthcare planning?: No    ROS: Review of Systems   Constitutional: Positive for activity change and fatigue  Negative for chills and fever  HENT: Negative for congestion, ear pain, rhinorrhea and sore throat  Eyes: Negative for discharge  Respiratory: Negative for cough, chest tightness, shortness of breath, wheezing and stridor  Cardiovascular: Negative for chest pain, palpitations and leg swelling  Gastrointestinal: Negative for abdominal pain, constipation, diarrhea, nausea and vomiting  Genitourinary: Positive for frequency (chronic)  Negative for decreased urine volume and dysuria  Musculoskeletal: Positive for gait problem     Skin: Negative for color change, pallor, rash and wound  Neurological: Positive for speech difficulty  Negative for dizziness  Psychiatric/Behavioral: Positive for decreased concentration  Negative for behavioral problems, confusion and sleep disturbance  The patient is not nervous/anxious          Allergies   Allergen Reactions    Hydrocodone-Acetaminophen Abdominal Pain    Erythromycin GI Intolerance     "stomach burning", bruises    Minocycline GI Intolerance     "stomach burning", bruises    Penicillins GI Intolerance     "burning stomach", bruising    Propoxyphene GI Intolerance    Tetracycline GI Intolerance     "stomach burning"-bruising    Vicodin [Hydrocodone-Acetaminophen] GI Intolerance     "stomach burning" ,nausea    Prednisone     Avelox [Moxifloxacin] GI Intolerance     Avoids d/t N/V    Percocet [Oxycodone-Acetaminophen] GI Intolerance     Avoids d/t N/V    Sulfa Antibiotics Rash       Medications:    Current Outpatient Medications on File Prior to Visit   Medication Sig Dispense Refill    Acetaminophen 325 MG CAPS Take 650 mg by mouth every 6 (six) hours      ALPRAZolam (XANAX) 0 5 mg tablet Take by mouth in the morning        amLODIPine (NORVASC) 5 mg tablet Take 5 mg by mouth daily      apixaban (ELIQUIS) 5 mg Take 5 mg by mouth 2 (two) times a day        brimonidine (ALPHAGAN P) 0 1 % 1 drop in the morning Each eye        Calcium Citrate (CITRACAL PO) Take by mouth every morning      Cholecalciferol (Vitamin D3) 1 25 MG (45189 UT) CAPS Take by mouth      Cyanocobalamin (B-12) 5000 MCG CAPS Take by mouth      latanoprost (XALATAN) 0 005 % ophthalmic solution Administer 1 drop to both eyes daily at bedtime        Multiple Vitamins-Minerals (PRESERVISION AREDS 2+MULTI VIT PO) Take 2 tablets by mouth daily        multivitamin (THERAGRAN) TABS Take 1 tablet by mouth every morning        pravastatin (PRAVACHOL) 40 mg tablet Take 40 mg by mouth daily at bedtime      Propylene Glycol 0 6 % SOLN 4 (four) times a day Each eye       quinapril (ACCUPRIL) 10 mg tablet Take 20 mg by mouth daily        apixaban (ELIQUIS) 5 mg Take 5 mg by mouth 2 (two) times a day      ascorbic acid (VITAMIN C) 500 mg tablet Take 500 mg by mouth every morning (Patient not taking: Reported on 2/10/2022 )      CLINDAMYCIN HCL PO Take 600 mg by mouth as needed Prior to invasive procedures   (Patient not taking: No sig reported)      clotrimazole-betamethasone (LOTRISONE) 1-0 05 % cream Apply topically 2 (two) times a day (Patient not taking: Reported on 9/21/2021) 30 g 0    Mirabegron ER 50 MG TB24 Take 1 tablet (50 mg total) by mouth daily at bedtime (Patient not taking: Reported on 9/21/2021) 30 tablet 9    omeprazole (PriLOSEC) 40 MG capsule Take 40 mg by mouth daily at bedtime (Patient not taking: Reported on 9/21/2021)      Polyethyl Glycol-Propyl Glycol (SYSTANE OP) Apply to eye 4 (four) times a day Each eye (Patient not taking: Reported on 9/21/2021)       No current facility-administered medications on file prior to visit         History:  Past Medical History:   Diagnosis Date    Arthritis     Borderline diabetes     watches diet    Diabetes mellitus ([facility] [address] )     Dry eye syndrome, bilateral     Full dentures     Gastric ulcer     dx in 9/2017    Glaucoma     had laser    History of mammogram 2018    Hyperlipidemia     controlled    Hypertension     controlled    Irregular heart beat 09/2017    "pre-mature" beat-saw cardiologist-12/17-Holter monitor 1/18    Ovarian cyst 07/06/2018    Papanicolaou smear for cervical cancer screening 06/05/2015    Neg     PONV (postoperative nausea and vomiting)     Presence of intracervical pessary     Wears glasses      Past Surgical History:   Procedure Laterality Date    APPENDECTOMY      age 6    COLONOSCOPY  09/13/2017    DILATION AND CURETTAGE OF UTERUS      EGD  01/22/2018    Ulcers healed    EGD AND COLONOSCOPY      ESOPHAGOGASTRIC FUNDOPLASTY 09/13/2017    JOINT REPLACEMENT Bilateral     knees    ROTATOR CUFF REPAIR Left     titanium screw    TONSILLECTOMY       Family History   Problem Relation Age of Onset    Cancer Mother         breast    Melanoma Mother         Malignant, internal melanoma     Heart disease Brother         CHF     Social History     Socioeconomic History    Marital status: /Civil Union     Spouse name: Not on file    Number of children: Not on file    Years of education: Not on file    Highest education level: Not on file   Occupational History    Not on file   Tobacco Use    Smoking status: Never Smoker    Smokeless tobacco: Never Used   Substance and Sexual Activity    Alcohol use: Yes     Comment: occ    Drug use: No    Sexual activity: Not Currently     Partners: Male   Other Topics Concern    Not on file   Social History Narrative    No alcohol intake - As per [place]     Caffeine intake: None    Seat belts used routinely     Advance directive: No     Social Determinants of Health     Financial Resource Strain: Not on file   Food Insecurity: Not on file   Transportation Needs: Not on file   Physical Activity: Not on file   Stress: Not on file   Social Connections: Not on file   Intimate Partner Violence: Not on file   Housing Stability: Not on file     Past Surgical History:   Procedure Laterality Date    APPENDECTOMY      age 6    COLONOSCOPY  09/13/2017    DILATION AND CURETTAGE OF UTERUS      EGD  01/22/2018    Ulcers healed    EGD AND COLONOSCOPY      ESOPHAGOGASTRIC FUNDOPLASTY  09/13/2017    JOINT REPLACEMENT Bilateral     knees    ROTATOR CUFF REPAIR Left     titanium screw    TONSILLECTOMY         OBJECTIVE:  Vitals:    08/10/22 1113   BP: 124/70   BP Location: Left arm   Patient Position: Sitting   Cuff Size: Adult   Pulse: 72   Resp: 16   Temp: 98 2 °F (36 8 °C)   TempSrc: Tympanic   SpO2: 97%   Weight: 93 2 kg (205 lb 6 4 oz)   Height: 5' 4" (1 626 m)     Body mass index is 35 26 kg/m²  Physical Exam  Vitals reviewed  Constitutional:       General: She is not in acute distress  Appearance: Normal appearance  She is well-developed  She is not ill-appearing or diaphoretic  HENT:      Head: Normocephalic and atraumatic  Right Ear: Tympanic membrane, ear canal and external ear normal       Left Ear: Tympanic membrane, ear canal and external ear normal       Nose: Nose normal       Mouth/Throat:      Mouth: Mucous membranes are moist       Pharynx: No oropharyngeal exudate  Eyes:      General: No scleral icterus  Right eye: No discharge  Left eye: No discharge  Conjunctiva/sclera: Conjunctivae normal    Neck:      Vascular: No JVD  Cardiovascular:      Rate and Rhythm: Normal rate and regular rhythm  Heart sounds: Murmur heard  No friction rub  No gallop  Pulmonary:      Effort: Pulmonary effort is normal  No respiratory distress  Breath sounds: Normal breath sounds  No wheezing or rales  Abdominal:      General: Bowel sounds are normal  There is no distension  Palpations: Abdomen is soft  Tenderness: There is no abdominal tenderness  There is no guarding  Musculoskeletal:         General: No tenderness or deformity  Normal range of motion  Cervical back: Normal range of motion and neck supple  Right lower leg: No edema  Left lower leg: No edema  Skin:     General: Skin is warm  Coloration: Skin is not pale  Findings: No erythema or rash  Neurological:      General: No focal deficit present  Mental Status: She is alert and oriented to person, place, and time  Mental status is at baseline  Cranial Nerves: No cranial nerve deficit  Gait: Gait abnormal       Deep Tendon Reflexes: Reflexes are normal and symmetric     Psychiatric:         Mood and Affect: Mood normal          MoCA: 18/30      Labs & Imaging:  No results found for: WBC, HGB, HCT, MCV, PLT  No results found for: NA, SODIUM, K, CL, CO2, ANIONGAP, AGAP, BUN, CREATININE, GLUC, GLUF, CALCIUM, AST, ALT, ALKPHOS, PROT, TP, BILITOT, TBILI, EGFR  Lab Results   Component Value Date    HGBA1C 5 6 02/07/2022     No results found for: IGESEGQK01  No results found for: [id number], TSH  No results found for: RPR  No results found for: [id number], [id number]   No results found for this or any previous visit

## 2022-08-10 NOTE — PATIENT INSTRUCTIONS
Xanax Weanin) take 1 tablet at night alternating with half tablet at night for 3 weeks  2)Take half tablet at night        CARE PLAN:  1  Mild dementia ([facility] [address] )  Assessment & Plan:  MOCA  (prev ), GDS 1, TUGT 14 sec  Patient with significant deficits in visual spatial, executive function, attention, language, abstraction and delayed recall  Given history, physical exam above neurocognitive screening, patient continues at a level of mild dementia  Patient with notable decline in the office today as she significant word-finding difficulty, was repetitive and required frequent reorientation redirection  The patient remains extremely sedentary at home and does not participate in any cognitively stimulating exercises  Discussed in detail with patient and family, however patient continues to refuse restarting cognitive therapy or participating in an additional cognitive and physical activity  Encourage family to ensure patient remains social  Recommend the memory cafe for positive socialization  Consider senior  programs in the future for positive socialization, physical and cognitive stimulation  Consider blister packaging for ease of medication administration  Recommend a falls Alert device as a safety precaution  Discussed with  should he need any additional services to call our MSW  Consider our caregiver support group for family members  Reorientation and redirection as needed  Manage chronic conditions  Maintain Falls precautions  Encourage patient to remain active mentally, physically and socially  Participate in cognitively stimulating exercises as able    Orders:  -     Ambulatory Referral to Speech Therapy; Future    2   Anxiety  Assessment & Plan:  Patient continues on alprazolam  Discuss states again side effects of chronic benzodiazepine use which may contribute to memory loss, confusion, amnesia, dysarthria  Weaning schedule given to patient's family  Start lorazepam 0 5 mg alternating with 0 25 mg 3 weeks then decreased to 0 25 mg daily for an additional 3 weeks  Will follow up with a medication check in that time  Continue social support by family and friends      3  Atrial fibrillation, unspecified type [facility])  Assessment & Plan:  No cardiorespiratory distress  Heart rate remains controlled  Continue Eliquis  Follow-up with Cardiology as scheduled      4  Benign hypertension with CKD (chronic kidney disease) stage III ([facility])  Assessment & Plan:  /70   Controlled   Continue Accupril 20mg daily  Healthy lifestyle changes      5  Gait instability  Assessment & Plan:  TUGT 14 sec  Previously completed physical therapy for gait training, balance and strengthening  Patient remains very sedentary with a fear of falling any further referrals therapy  Maintain Falls precautions      6   Other hyperlipidemia  Assessment & Plan:  Maintained on pravastatin 40 mg daily  Continue healthy lifestyle changes Initial (On Arrival)

## 2022-08-11 NOTE — ASSESSMENT & PLAN NOTE
Patient continues on alprazolam  Discuss states again side effects of chronic benzodiazepine use which may contribute to memory loss, confusion, amnesia, dysarthria  Weaning schedule given to patient's family  Start lorazepam 0 5 mg alternating with 0 25 mg 3 weeks then decreased to 0 25 mg daily for an additional 3 weeks      Will follow up with a medication check in that time  Continue social support by family and friends

## 2022-08-11 NOTE — ASSESSMENT & PLAN NOTE
MOCA 18/30 (prev 23/30), GDS 1, TUGT 14 sec  Patient with significant deficits in visual spatial, executive function, attention, language, abstraction and delayed recall    Given history, physical exam above neurocognitive screening, patient continues at a level of mild dementia  Patient with notable decline in the office today as she significant word-finding difficulty, was repetitive and required frequent reorientation redirection  The patient remains extremely sedentary at home and does not participate in any cognitively stimulating exercises  Discussed in detail with patient and family, however patient continues to refuse restarting cognitive therapy or participating in an additional cognitive and physical activity  Encourage family to ensure patient remains social  Recommend the memory cafe for positive socialization  Consider senior  programs in the future for positive socialization, physical and cognitive stimulation  Discussed pharmacotherapy options, family declines trialing Aricept  Consider blister packaging for ease of medication administration  Recommend a falls Alert device as a safety precaution  Discussed with  should he need any additional services to call our MSW  Consider our caregiver support group for family members  Continue routine follow-up with neurosurgery given history of brain cyst  Reorientation and redirection as needed  Manage chronic conditions  Maintain Falls precautions  Encourage patient to remain active mentally, physically and socially  Participate in cognitively stimulating exercises as able

## 2022-08-11 NOTE — ASSESSMENT & PLAN NOTE
No cardiorespiratory distress  Heart rate remains controlled  Continue Eliquis  Follow-up with Cardiology as scheduled

## 2022-08-11 NOTE — ASSESSMENT & PLAN NOTE
TUGT 14 sec  Previously completed physical therapy for gait training, balance and strengthening  Patient remains very sedentary with a fear of falling any further referrals therapy  Maintain Falls precautions

## 2022-08-11 NOTE — ASSESSMENT & PLAN NOTE
Was seen by Neurosurgery recommendation for no intervention at this time  Patient is scheduled for 1 year repeat MRI  Follow-up with neurosurgery as scheduled

## 2022-08-15 ENCOUNTER — NURSE TRIAGE (OUTPATIENT)
Dept: OTHER | Facility: OTHER | Age: 87
End: 2022-08-15

## 2022-08-15 NOTE — TELEPHONE ENCOUNTER
Reason for Disposition   Swelling is painful to touch and no fever    Answer Assessment - Initial Assessment Questions  1  APPEARANCE of SWELLING: "What does it look like?" (e g , lymph node, insect bite, mole)      Lump     2  SIZE: "How large is the swelling?" (inches, cm or compare to coins)      Smaller than a marble     3  LOCATION: "Where is the swelling located?"      Outside of vagina    4  ONSET: "When did the swelling start?"      Couple days ago     5  PAIN: "Is it painful?" If Yes, ask: "How much?"      Yes, hurts to sit     6  ITCH: "Does it itch?" If Yes, ask: "How much?"      Denies    7  CAUSE: "What do you think caused the swelling?"      Unknown     8   OTHER SYMPTOMS: "Do you have any other symptoms?" (e g , fever)      Denies    Protocols used: SKIN LUMP OR LOCALIZED SWELLING-ADULT-OH

## 2022-08-15 NOTE — TELEPHONE ENCOUNTER
Regarding: Lumb on out side of her vagina  ----- Message from Yanira Martines sent at 8/15/2022  9:25 AM EDT -----  "I have a little lump (smaller than a marble) on the out side of my vagina  I put neosporin on it but it didn't help  Last night a little blood came out  I wanted to made an appt   With Dr Lyndon Foster"

## 2022-08-16 ENCOUNTER — OFFICE VISIT (OUTPATIENT)
Dept: OBGYN CLINIC | Facility: CLINIC | Age: 87
End: 2022-08-16
Payer: MEDICARE

## 2022-08-16 DIAGNOSIS — N76.4 VULVAR ABSCESS: Primary | ICD-10-CM

## 2022-08-16 PROCEDURE — 87070 CULTURE OTHR SPECIMN AEROBIC: CPT | Performed by: OBSTETRICS & GYNECOLOGY

## 2022-08-16 PROCEDURE — 99213 OFFICE O/P EST LOW 20 MIN: CPT | Performed by: OBSTETRICS & GYNECOLOGY

## 2022-08-16 PROCEDURE — 87205 SMEAR GRAM STAIN: CPT | Performed by: OBSTETRICS & GYNECOLOGY

## 2022-08-16 PROCEDURE — 87077 CULTURE AEROBIC IDENTIFY: CPT | Performed by: OBSTETRICS & GYNECOLOGY

## 2022-08-16 PROCEDURE — 87186 SC STD MICRODIL/AGAR DIL: CPT | Performed by: OBSTETRICS & GYNECOLOGY

## 2022-08-16 RX ORDER — CLINDAMYCIN HYDROCHLORIDE 300 MG/1
300 CAPSULE ORAL 4 TIMES DAILY
Qty: 40 CAPSULE | Refills: 0 | Status: SHIPPED | OUTPATIENT
Start: 2022-08-16 | End: 2022-08-26

## 2022-08-16 NOTE — PROGRESS NOTES
Assessment/Plan:         Diagnoses and all orders for this visit:    Vulvar abscess  -     clindamycin (CLEOCIN) 300 MG capsule; Take 1 capsule (300 mg total) by mouth 4 (four) times a day for 10 days          Subjective:      Patient ID: Talib Phoenix is a 80 y o  female  The patient is an 66-year-old postmenopausal female who presents today with a painful lesion on her right vulva  The lesion is slightly above the Bartholin's gland and is spontaneously draining  She has been using warm washcloths and Neosporin ointment  There is generalized erythema  She denies fever or chills  The lesion is painful  She it has been there for a few days and started draining spontaneously last night with some relief in the pain  She has multiple allergies to antibiotics but has a standing order for clindamycin prior to invasive procedures which she is able to take without difficulty  Will prescribe antibiotics and recheck the lesion approximately 2 weeks  The following portions of the patient's history were reviewed and updated as appropriate: allergies, current medications, past family history, past medical history, past social history, past surgical history and problem list     Review of Systems   Constitutional: Negative for chills, diaphoresis, fatigue, fever and unexpected weight change  HENT: Negative for congestion, sinus pressure, sinus pain, tinnitus and trouble swallowing  Eyes: Negative for visual disturbance  Respiratory: Negative for cough, chest tightness and shortness of breath  Cardiovascular: Negative for chest pain, palpitations and leg swelling  Gastrointestinal: Negative for abdominal distention, abdominal pain, anal bleeding, constipation, diarrhea, nausea, rectal pain and vomiting  Endocrine: Negative for heat intolerance  Genitourinary: Negative for difficulty urinating, dysuria, flank pain, frequency, genital sores, hematuria and urgency     Musculoskeletal: Negative for arthralgias, back pain and joint swelling  Skin: Negative for rash  Allergic/Immunologic: Negative for environmental allergies and food allergies  Neurological: Negative for headaches  Hematological: Negative for adenopathy  Does not bruise/bleed easily  Psychiatric/Behavioral: Negative for decreased concentration and dysphoric mood  The patient is not nervous/anxious  Objective: There were no vitals taken for this visit  Physical Exam  Vitals and nursing note reviewed  Exam conducted with a chaperone present  Constitutional:       Appearance: Normal appearance  She is normal weight  HENT:      Head: Normocephalic and atraumatic  Nose: Nose normal    Eyes:      Conjunctiva/sclera: Conjunctivae normal    Pulmonary:      Effort: Pulmonary effort is normal    Abdominal:      General: Abdomen is flat  Palpations: Abdomen is soft  Genitourinary:      Musculoskeletal:         General: Normal range of motion  Skin:     General: Skin is warm and dry  Neurological:      General: No focal deficit present  Mental Status: She is alert  Mental status is at baseline  Psychiatric:         Mood and Affect: Mood normal          Behavior: Behavior normal          Thought Content:  Thought content normal          Judgment: Judgment normal

## 2022-08-19 LAB
BACTERIA WND AEROBE CULT: ABNORMAL
BACTERIA WND AEROBE CULT: ABNORMAL
GRAM STN SPEC: ABNORMAL

## 2022-09-21 ENCOUNTER — TELEMEDICINE (OUTPATIENT)
Dept: GERIATRICS | Age: 87
End: 2022-09-21
Payer: MEDICARE

## 2022-09-21 ENCOUNTER — OFFICE VISIT (OUTPATIENT)
Dept: OBGYN CLINIC | Facility: CLINIC | Age: 87
End: 2022-09-21
Payer: MEDICARE

## 2022-09-21 VITALS
SYSTOLIC BLOOD PRESSURE: 140 MMHG | HEIGHT: 64 IN | BODY MASS INDEX: 35 KG/M2 | WEIGHT: 205 LBS | DIASTOLIC BLOOD PRESSURE: 60 MMHG

## 2022-09-21 DIAGNOSIS — F03.A0 MILD DEMENTIA: ICD-10-CM

## 2022-09-21 DIAGNOSIS — F41.9 ANXIETY: Primary | ICD-10-CM

## 2022-09-21 DIAGNOSIS — N76.4 VULVAR ABSCESS: Primary | ICD-10-CM

## 2022-09-21 PROCEDURE — 99213 OFFICE O/P EST LOW 20 MIN: CPT | Performed by: OBSTETRICS & GYNECOLOGY

## 2022-09-21 PROCEDURE — 99441 PR PHYS/QHP TELEPHONE EVALUATION 5-10 MIN: CPT | Performed by: STUDENT IN AN ORGANIZED HEALTH CARE EDUCATION/TRAINING PROGRAM

## 2022-09-21 NOTE — ASSESSMENT & PLAN NOTE
Patient chronically on Xanax however denied any overt symptoms of anxiety at prior visit as her by family    Attempted to wean Xanax   Patient successfully weaned to 0 25 mg at nighttime on alternate days   Will continue current regimen for now  Would consider an SSRI in the future should anxiety worsen for maintenance management instead of benzodiazepine therapy given her cognitive deficits  Family to follow-up with our office should they wish to continue weaning  Continue social support by family and friends

## 2022-09-21 NOTE — ASSESSMENT & PLAN NOTE
Reorientation and redirection as needed   Manage chronic conditions and follow with PCP   Encourage patient remain active mentally, physically and socially   Participate in cognitively stimulating exercises as able

## 2022-09-21 NOTE — PROGRESS NOTES
Assessment/Plan:         Diagnoses and all orders for this visit:    Vulvar abscess          Subjective:      Patient ID: Talib Phoenix is a 80 y o  female  Patient is an 49-year-old  4 para 80 postmenopausal female who presents for recheck of a vulvar abscess  The culture grew Klebsiella and she was treated with clindamycin  The abscess is completely resolved  She is seeing a urogynecologist and will be fitted with a new pessary in and attempt to decrease her urinary incontinence and possible overactive bladder  We will see her back as needed  The following portions of the patient's history were reviewed and updated as appropriate: allergies, current medications, past family history, past medical history, past social history, past surgical history and problem list     Review of Systems   Constitutional: Negative for chills, diaphoresis, fatigue, fever and unexpected weight change  HENT: Negative for congestion, sinus pressure, sinus pain, tinnitus and trouble swallowing  Eyes: Negative for visual disturbance  Respiratory: Negative for cough, chest tightness and shortness of breath  Cardiovascular: Negative for chest pain, palpitations and leg swelling  Gastrointestinal: Negative for abdominal distention, abdominal pain, anal bleeding, constipation, diarrhea, nausea, rectal pain and vomiting  Endocrine: Negative for heat intolerance  Genitourinary: Negative for difficulty urinating, dysuria, flank pain, frequency, genital sores, hematuria and urgency  Musculoskeletal: Negative for arthralgias, back pain and joint swelling  Skin: Negative for rash  Allergic/Immunologic: Negative for environmental allergies and food allergies  Neurological: Negative for headaches  Hematological: Negative for adenopathy  Does not bruise/bleed easily  Psychiatric/Behavioral: Negative for decreased concentration and dysphoric mood  The patient is not nervous/anxious  Objective:      /60 (BP Location: Left arm, Patient Position: Sitting, Cuff Size: Large)   Ht 5' 4" (1 626 m)   Wt 93 kg (205 lb)   BMI 35 19 kg/m²          Physical Exam  Vitals and nursing note reviewed  Exam conducted with a chaperone present  Constitutional:       Appearance: Normal appearance  She is normal weight  HENT:      Head: Normocephalic and atraumatic  Nose: Nose normal    Eyes:      Conjunctiva/sclera: Conjunctivae normal    Pulmonary:      Effort: Pulmonary effort is normal    Abdominal:      General: Abdomen is flat  Palpations: Abdomen is soft  Musculoskeletal:         General: Normal range of motion  Skin:     General: Skin is warm and dry  Neurological:      General: No focal deficit present  Mental Status: She is alert  Mental status is at baseline  Psychiatric:         Mood and Affect: Mood normal          Behavior: Behavior normal          Thought Content:  Thought content normal          Judgment: Judgment normal

## 2022-09-22 NOTE — PROGRESS NOTES
Virtual Brief Visit    Patient is located in the following state in which I hold an active license [place]    Patient presents for medication check in after recommendations to wean alprazolam at prior visit  The patient has been on chronic benzodiazepine therapy for anxiety which can contribute to amnesia, impaired coordination, memory loss and confusion in older patients  At prior visit, patient and family admitted that anxiety was notably controlled and the patient had been using alprazolam daily despite no symptoms of anxiety  Weaning schedule was given to the patient and her family and she currently is maintained on 0 25 mg on alternate nights  Since weaning, both patient and family relay that her mood has remained stable overall   explains only recently she will perseverate on certain tasks which become repetitive  Would recommend continuing current regimen for the next few months and to follow up with our geriatric nurse practitioner should patient and family wish to wean completely off this medication  No cardiorespiratory distress, fever, chills, URI or urinary symptoms    Assessment/Plan:    Problem List Items Addressed This Visit        Nervous and Auditory    Mild dementia ([address] )     Reorientation and redirection as needed   Manage chronic conditions and follow with PCP   Encourage patient remain active mentally, physically and socially   Participate in cognitively stimulating exercises as able            Other    Anxiety - Primary     Patient chronically on Xanax however denied any overt symptoms of anxiety at prior visit as her by family    Attempted to wean Xanax   Patient successfully weaned to 0 25 mg at nighttime on alternate days   Will continue current regimen for now  Would consider an SSRI in the future should anxiety worsen for maintenance management instead of benzodiazepine therapy given her cognitive deficits  Family to follow-up with our office should they wish to continue weaning  Continue social support by family and friends               Recent Visits  Date Type Provider Dept   09/21/22 Nicole Pompa MD [address] [place] recent visits within past 7 days and meeting all other requirements  Future Appointments  No visits were found meeting these conditions    Showing future appointments within next 150 days and meeting all other requirements         I spent 10 minutes directly with the patient during this visit

## 2022-10-05 ENCOUNTER — TELEPHONE (OUTPATIENT)
Dept: SPEECH THERAPY | Facility: CLINIC | Age: 87
End: 2022-10-05

## 2023-02-10 ENCOUNTER — OFFICE VISIT (OUTPATIENT)
Age: 88
End: 2023-02-10

## 2023-02-10 VITALS
TEMPERATURE: 97.5 F | DIASTOLIC BLOOD PRESSURE: 70 MMHG | HEART RATE: 64 BPM | BODY MASS INDEX: 32.81 KG/M2 | WEIGHT: 192.2 LBS | SYSTOLIC BLOOD PRESSURE: 140 MMHG | OXYGEN SATURATION: 98 % | HEIGHT: 64 IN

## 2023-02-10 DIAGNOSIS — E11.22 TYPE 2 DIABETES MELLITUS WITH STAGE 3 CHRONIC KIDNEY DISEASE AND HYPERTENSION (HCC): ICD-10-CM

## 2023-02-10 DIAGNOSIS — N18.30 BENIGN HYPERTENSION WITH CKD (CHRONIC KIDNEY DISEASE) STAGE III (HCC): ICD-10-CM

## 2023-02-10 DIAGNOSIS — M79.89 SWELLING OF LEFT LOWER EXTREMITY: ICD-10-CM

## 2023-02-10 DIAGNOSIS — I12.9 TYPE 2 DIABETES MELLITUS WITH STAGE 3 CHRONIC KIDNEY DISEASE AND HYPERTENSION (HCC): ICD-10-CM

## 2023-02-10 DIAGNOSIS — I12.9 BENIGN HYPERTENSION WITH CKD (CHRONIC KIDNEY DISEASE) STAGE III (HCC): ICD-10-CM

## 2023-02-10 DIAGNOSIS — F03.A0 MILD DEMENTIA WITHOUT BEHAVIORAL DISTURBANCE, PSYCHOTIC DISTURBANCE, MOOD DISTURBANCE, OR ANXIETY, UNSPECIFIED DEMENTIA TYPE: Primary | ICD-10-CM

## 2023-02-10 DIAGNOSIS — F41.9 ANXIETY: ICD-10-CM

## 2023-02-10 DIAGNOSIS — N18.30 TYPE 2 DIABETES MELLITUS WITH STAGE 3 CHRONIC KIDNEY DISEASE AND HYPERTENSION (HCC): ICD-10-CM

## 2023-02-10 DIAGNOSIS — R26.81 GAIT INSTABILITY: ICD-10-CM

## 2023-02-10 NOTE — PROGRESS NOTES
Assessment & Plan:   1  Mild dementia without behavioral disturbance, psychotic disturbance, mood disturbance, or anxiety, unspecified dementia type  Assessment & Plan:  Pt independent with adl, dependent/assist for iadls  Memory loss: STM/LTM declining but stable, word finding issues   **MOCA 18/30- deficits in executive function, attention, language, recall  Patient scored 6/6 in  orientation  Cont supportive cares -lives with , daughter supportive  Ensure pt has phone and reinforce to not use stove or shower while gone  SW needs this visit: See intake  Discussed safe environment  Some items to consider would be door video surveillance, ID bracelet, Tile GPS - in case wandering ever occurs  Daughter would like information on HHA to help with social interaction and exercise for patient  Continue to engage in physical, cognitive, social activity  Cont doing games, puzzles, trivia, current event discussions   **We will refer to OT for cognitive therapy  Cont daily walks or exercise video  Cont outings with friends and family  Avoid social isolation  Optimize chronic and acute conditions  Cont to f/u with providers and ensure medication compliance  Vascular risk factors: A-fib, hypertension, DM 2  Monitor for changes in behavior/mood- if noted, notify provider for eval  Avoid medications that worsen cognition - check with provider or pharmacist before starting new or OTC meds  Do not overwhelm pt with info  Do one task at a time  Use slower pace  Keep to one conversation at a time  Do not multitask  2  Gait instability  Assessment & Plan:  · Patient utilizes rollator, cane  Has fall history  · Continue fall precautions      3   Benign hypertension with CKD (chronic kidney disease) stage III ([facility])  Assessment & Plan:  No results found for: EGFR, CREATININE   · BP stable without complaints of headache or dizziness  · Patient continues on enalapril  · Continue dietary and lifestyle interventions      4  Anxiety  Assessment & Plan:  · Mood stable  Patient denies currently anxiety or depression  · Patient continues on Xanax, she is using half dose per    and daughter were counseled on potential side effects of Xanax which include increase cognitive decline and fall risk  · Continue emotional support, relaxation techniques      5  Type 2 diabetes mellitus with stage 3 chronic kidney disease and hypertension ([facility] [address] )  Assessment & Plan:    Lab Results   Component Value Date    HGBA1C 5 6 02/07/2022   · Does not check fingerstics, HA1c is good  · Conts diet controlled  · Continue dietary and lifestyle interventions  · Continue follow-ups with PCP, ophthalmology, podiatry      6  Swelling of left lower extremity  Assessment & Plan:  · Unclear if this is new or chronic finding, but did show to  and daughter  · Pt is on eliquis, will be transitioning to coumadin  · Requested dgt to follow up with pcp to see if this needs to be evaluated  She verbalized understanding and states they will follow up  HPI:  We had the pleasure of evaluating Rayetta Hodgkins who is a 80 y o  female in Geriatric follow up today  Previous MOCA:  18/30  Comorbidities include mild dementia, htn, dm2, gait instability  She lives with     Memory update per family:  Ms Kenzie Edwards is in the office with her  and daughter  STM  Independent with adls  Cooking - good, no cleaning  Takes own medications-  monitors   monitors banking - sometimes not quite as good  No longer drives  Mood is doing ok  Having more pbs with speech - ST was working with- can't hold conversation, can make needs known  Having more trouble with memory  Not doing much activity- watches too much tv  Goes out to lunch often   goes out for short periods, pt has life alert, she does well when home alone    Has appointment with urology on Monday as her pessary is out and bladder has dropped per dgt/  Memory update per patient  STM/LTM moderate decline, but stable  Memory loss about the same  Does self care independ  Cooking is ok, no pbs   cleans, doing ok with laundry machine  Does own meds   does most banking  Doesn't drive  Likes word search, game show on tv  No hobbies  Walks in the spring  No steps inside  Going out to eat, visiting kids  Denies anxiety/depression  Gets up for bathroom multiple times to void  Denies constipation  Appetite good  No swallow issues  She has difficulty finding the right word while speaking: Yes  Patient requires repeat information or ask the same question repeatedly: Yes  Do you drive: No       Do you handle your own financial affairs such as balancing your checkbook, paying bills, investments: Yes - patient and  do together  Have you or your family noted any change in your mood or personality:No  Are you currently or have you been treated in the past for depression or anxiety: Yes - pt did decrease xanax dosing  Have you noticed any gait or balance disorder: Yes  Uses :Walker: recent fall around the bed- no injury  Any hallucination or delusion: No  Sleep Issues: Yes - for BR  Urinary/Stool Incontinence: No  Hearing and vision issue: No- doing good  ADL/IADL:  Independent/assist  Appetite/swallow:  Good/good  Pain:  back    Past Medical, surgical, social, medication and allergy history and patients previous records reviewed  Family Review of Behavior [facility]:    pacing  No - more sedentary  agressive/combative behavior  No    agitated  No   wandering  No   resistance to care  Yes   hoarding/hiding objects  No    suspicious  No  withdrawn No  misplacing/losing objects No  personal hygiene problems  No  forgetfulness of actions No     ROS: Review of Systems   Constitutional: Negative for activity change, appetite change, chills and fatigue  HENT: Negative for congestion, hearing loss and trouble swallowing  Eyes: Negative for visual disturbance (wears glasses)  Respiratory: Negative for cough and shortness of breath  Cardiovascular: Negative for chest pain  Gastrointestinal: Negative for abdominal pain, constipation, diarrhea, nausea and vomiting  Genitourinary: Negative for difficulty urinating  Musculoskeletal: Positive for back pain and gait problem  Negative for arthralgias  Neurological: Negative for dizziness and light-headedness  Psychiatric/Behavioral: Positive for decreased concentration (forgetful)  Negative for dysphoric mood, hallucinations and sleep disturbance  The patient is not nervous/anxious  Allergies:    Allergies   Allergen Reactions   • Hydrocodone-Acetaminophen Abdominal Pain   • Erythromycin GI Intolerance     "stomach burning", bruises   • Minocycline GI Intolerance     "stomach burning", bruises   • Penicillins GI Intolerance     "burning stomach", bruising   • Propoxyphene GI Intolerance   • Tetracycline GI Intolerance     "stomach burning"-bruising   • Vicodin [Hydrocodone-Acetaminophen] GI Intolerance     "stomach burning" ,nausea   • Prednisone    • Avelox [Moxifloxacin] GI Intolerance     Avoids d/t N/V   • Percocet [Oxycodone-Acetaminophen] GI Intolerance     Avoids d/t N/V   • Sulfa Antibiotics Rash       Medications:      Current Outpatient Medications:   •  ALPRAZolam (XANAX) 0 5 mg tablet, Take by mouth in the morning  , Disp: , Rfl:   •  amLODIPine (NORVASC) 5 mg tablet, Take 5 mg by mouth daily, Disp: , Rfl:   •  apixaban (ELIQUIS) 5 mg, Take 5 mg by mouth 2 (two) times a day  , Disp: , Rfl:   •  brimonidine (ALPHAGAN P) 0 1 %, 1 drop in the morning Each eye  , Disp: , Rfl:   •  Calcium Citrate (CITRACAL PO), Take by mouth every morning, Disp: , Rfl:   •  Cholecalciferol (Vitamin D3) 1 25 MG (26838 UT) CAPS, Take by mouth, Disp: , Rfl:   •  CLINDAMYCIN HCL PO, Take 600 mg by mouth as needed Prior to invasive procedures, Disp: , Rfl:   •  Cyanocobalamin (B-12) 5000 MCG CAPS, Take by mouth, Disp: , Rfl:   •  latanoprost (XALATAN) 0 005 % ophthalmic solution, Administer 1 drop to both eyes daily at bedtime  , Disp: , Rfl:   •  Multiple Vitamins-Minerals (PRESERVISION AREDS 2+MULTI VIT PO), Take 2 tablets by mouth daily  , Disp: , Rfl:   •  multivitamin (THERAGRAN) TABS, Take 1 tablet by mouth every morning  , Disp: , Rfl:   •  pravastatin (PRAVACHOL) 40 mg tablet, Take 40 mg by mouth daily at bedtime, Disp: , Rfl:   •  Propylene Glycol 0 6 % SOLN, 4 (four) times a day Each eye , Disp: , Rfl:   •  quinapril (ACCUPRIL) 10 mg tablet, Take 20 mg by mouth daily  , Disp: , Rfl:   •  Acetaminophen 325 MG CAPS, Take 650 mg by mouth every 6 (six) hours, Disp: , Rfl:   •  apixaban (ELIQUIS) 5 mg, Take 5 mg by mouth 2 (two) times a day, Disp: , Rfl:   •  ascorbic acid (VITAMIN C) 500 mg tablet, Take 500 mg by mouth every morning (Patient not taking: Reported on 2/10/2022 ), Disp: , Rfl:   •  clotrimazole-betamethasone (LOTRISONE) 1-0 05 % cream, Apply topically 2 (two) times a day (Patient not taking: Reported on 9/21/2021), Disp: 30 g, Rfl: 0  •  Mirabegron ER 50 MG TB24, Take 1 tablet (50 mg total) by mouth daily at bedtime (Patient not taking: Reported on 9/21/2021), Disp: 30 tablet, Rfl: 9  •  omeprazole (PriLOSEC) 40 MG capsule, Take 40 mg by mouth daily at bedtime (Patient not taking: Reported on 9/21/2021), Disp: , Rfl:   •  Polyethyl Glycol-Propyl Glycol (SYSTANE OP), Apply to eye 4 (four) times a day Each eye (Patient not taking: Reported on 9/21/2021), Disp: , Rfl:     Vitals:  Vitals:    02/10/23 1123   BP: 140/70   Pulse: 64   Temp: 97 5 °F (36 4 °C)   SpO2: 98%       History:  Past Medical History:   Diagnosis Date   • Arthritis    • Borderline diabetes     watches diet   • Diabetes mellitus (HCC)    • Dry eye syndrome, bilateral    • Full dentures    • Gastric ulcer     dx in 9/2017   • Glaucoma     had laser   • History of mammogram 2018   • Hyperlipidemia controlled   • Hypertension     controlled   • Irregular heart beat 09/2017    "pre-mature" beat-saw cardiologist-12/17-Holter monitor 1/18   • Ovarian cyst 07/06/2018   • Papanicolaou smear for cervical cancer screening 06/05/2015    Neg    • PONV (postoperative nausea and vomiting)    • Presence of intracervical pessary    • Wears glasses      Past Surgical History:   Procedure Laterality Date   • APPENDECTOMY      age 6   • COLONOSCOPY  09/13/2017   • DILATION AND CURETTAGE OF UTERUS     • EGD  01/22/2018    Ulcers healed   • EGD AND COLONOSCOPY     • ESOPHAGOGASTRIC FUNDOPLASTY  09/13/2017   • JOINT REPLACEMENT Bilateral     knees   • ROTATOR CUFF REPAIR Left     titanium screw   • TONSILLECTOMY       Family History   Problem Relation Age of Onset   • Cancer Mother         breast   • Melanoma Mother         Malignant, internal melanoma    • Heart disease Brother         CHF     Social History     Socioeconomic History   • Marital status: /Civil Union     Spouse name: Not on file   • Number of children: Not on file   • Years of education: Not on file   • Highest education level: Not on file   Occupational History   • Not on file   Tobacco Use   • Smoking status: Never   • Smokeless tobacco: Never   Vaping Use   • Vaping Use: Not on file   Substance and Sexual Activity   • Alcohol use: Yes     Comment: occ   • Drug use: No   • Sexual activity: Not Currently     Partners: Male   Other Topics Concern   • Not on file   Social History Narrative    No alcohol intake - As per [place]     Caffeine intake: None    Seat belts used routinely     Advance directive: No     Social Determinants of Health     Financial Resource Strain: Not on file   Food Insecurity: Not on file   Transportation Needs: Not on file   Physical Activity: Not on file   Stress: Not on file   Social Connections: Not on file   Intimate Partner Violence: Not on file   Housing Stability: Not on file     Past Surgical History:   Procedure Laterality Date   • APPENDECTOMY      age 6   • COLONOSCOPY  09/13/2017   • DILATION AND CURETTAGE OF UTERUS     • EGD  01/22/2018    Ulcers healed   • EGD AND COLONOSCOPY     • ESOPHAGOGASTRIC FUNDOPLASTY  09/13/2017   • JOINT REPLACEMENT Bilateral     knees   • ROTATOR CUFF REPAIR Left     titanium screw   • TONSILLECTOMY           Physical Exam:  Observed ambulation: Use of rollator, slow gait, fairly steady  Physical Exam  Vitals and nursing note reviewed  Constitutional:       General: She is not in acute distress  Appearance: Normal appearance  She is well-developed  She is not diaphoretic  HENT:      Head: Normocephalic  Cardiovascular:      Rate and Rhythm: Normal rate  Heart sounds: No murmur heard  No friction rub  No gallop  Pulmonary:      Effort: Pulmonary effort is normal  No respiratory distress  Breath sounds: Normal breath sounds  No wheezing or rales  Abdominal:      General: Bowel sounds are normal  There is no distension  Palpations: Abdomen is soft  Tenderness: There is no abdominal tenderness  There is no rebound  Musculoskeletal:         General: Swelling (left LE +1 edema, warm, no erythema or tenderness,) present  Normal range of motion  Skin:     General: Skin is warm and dry  Neurological:      General: No focal deficit present  Mental Status: She is alert and oriented to person, place, and time  Mental status is at baseline        Comments: Patient does have some difficulty with words, when given time she is able to think of words she wants to say   Psychiatric:         Mood and Affect: Mood normal          Behavior: Behavior normal

## 2023-02-10 NOTE — ASSESSMENT & PLAN NOTE
Lab Results   Component Value Date    HGBA1C 5 6 02/07/2022   · Does not check fingerstics, HA1c is good  · Conts diet controlled  · Continue dietary and lifestyle interventions  · Continue follow-ups with PCP, ophthalmology, podiatry

## 2023-02-10 NOTE — ASSESSMENT & PLAN NOTE
Pt independent with adl, dependent/assist for iadls  Memory loss: STM/LTM declining but stable, word finding issues   **MOCA 18/30- deficits in executive function, attention, language, recall  Patient scored 6/6 in  orientation  Cont supportive cares -lives with , daughter supportive  Ensure pt has phone and reinforce to not use stove or shower while gone  SW needs this visit: See intake  Discussed safe environment  Some items to consider would be door video surveillance, ID bracelet, Tile GPS - in case wandering ever occurs  Daughter would like information on HHA to help with social interaction and exercise for patient  Continue to engage in physical, cognitive, social activity  Cont doing games, puzzles, trivia, current event discussions   **We will refer to OT for cognitive therapy  Cont daily walks or exercise video  Cont outings with friends and family  Avoid social isolation  Optimize chronic and acute conditions  Cont to f/u with providers and ensure medication compliance  Vascular risk factors: A-fib, hypertension, DM 2  Monitor for changes in behavior/mood- if noted, notify provider for eval  Avoid medications that worsen cognition - check with provider or pharmacist before starting new or OTC meds  Do not overwhelm pt with info  Do one task at a time  Use slower pace  Keep to one conversation at a time  Do not multitask

## 2023-02-10 NOTE — ASSESSMENT & PLAN NOTE
· Mood stable  Patient denies currently anxiety or depression  · Patient continues on Xanax, she is using half dose per      and daughter were counseled on potential side effects of Xanax which include increase cognitive decline and fall risk  · Continue emotional support, relaxation techniques

## 2023-02-10 NOTE — ASSESSMENT & PLAN NOTE
No results found for: EGFR, CREATININE   · BP stable without complaints of headache or dizziness  · Patient continues on enalapril  · Continue dietary and lifestyle interventions

## 2023-02-10 NOTE — ASSESSMENT & PLAN NOTE
· Unclear if this is new or chronic finding, but did show to  and daughter  · Pt is on eliquis, will be transitioning to coumadin  · Requested dgt to follow up with pcp to see if this needs to be evaluated  She verbalized understanding and states they will follow up

## 2023-02-10 NOTE — PROGRESS NOTES
[facility]  [address] [place] [address], [address] [place] [address], [address] [place] [address]  [phone]    Social Work Follow-Up    LSW met with [facility] for follow up visit  Completed Des Cognitive Assessment, score 18/30 (previously 18/30 in 8/10/22), and Geriatric Depression Screen, 1/15 (previously 1/15 in 8/10/22)  LSW also met with daughter Choco Jones and  Nicola Luciano who report that Madison Baxter has not been very active at home  LSW provided Participating in [address], [facility] [address] [facility] [address]  [facility] and Games: Tips from the [employer] on [address], [employer] on Communication, brochure for [facility] OT services (Haydee Crawford will place referral), and home health aide list as options to help encourage Madison Baxter to be more active physically and cognitively  Des Cognitive Assessment (MoCA) Version 8 3  Education: HS    Points Earned POSSIBLE Points   Visuospatial/Executive   Alternating [place] Making 0 1   Visuoconstructional skills 0 1   Visuoconstructional skills (clock) 2 3   Naming   Naming Animals 3 3   Attention   Digit Span 1 2   Vigilance (letters) 0 1   Serial 7 subtraction 1 3   Language   Sentence Repetition 0 2   Verbal fluency 0 1   Abstraction   Abstraction (word pairings) 2 2   Delayed recall   Delayed recall 2 5   Memory index score: 8/15   Orientation   Orientation 6 6   TOTAL SCORE: 18/30  (Normal ?26/30)   Additional notes:      LSW to remain available as needed

## 2023-02-13 ENCOUNTER — OFFICE VISIT (OUTPATIENT)
Dept: OBGYN CLINIC | Facility: CLINIC | Age: 88
End: 2023-02-13

## 2023-02-13 VITALS — HEIGHT: 64 IN | BODY MASS INDEX: 32.95 KG/M2 | WEIGHT: 193 LBS

## 2023-02-13 DIAGNOSIS — N32.81 OVERACTIVE BLADDER DUE TO PROLAPSE OF FEMALE GENITAL ORGAN: ICD-10-CM

## 2023-02-13 DIAGNOSIS — N81.4 UTERINE PROLAPSE: Primary | ICD-10-CM

## 2023-02-13 DIAGNOSIS — N81.9 OVERACTIVE BLADDER DUE TO PROLAPSE OF FEMALE GENITAL ORGAN: ICD-10-CM

## 2023-02-13 RX ORDER — OXYBUTYNIN CHLORIDE 5 MG/1
5 TABLET, EXTENDED RELEASE ORAL
Qty: 90 TABLET | Refills: 12 | Status: SHIPPED | OUTPATIENT
Start: 2023-02-13

## 2023-02-13 NOTE — PROGRESS NOTES
Assessment/Plan:       Diagnoses and all orders for this visit:    Overactive bladder due to prolapse of female genital organ  -     oxybutynin (DITROPAN-XL) 5 mg 24 hr tablet; Take 1 tablet (5 mg total) by mouth daily at bedtime    Other orders  -     warfarin (COUMADIN) 0 5 mg tablet; Take 0 5 mg by mouth daily          Subjective:      Patient ID: Gina Glez is a 80 y o  female  The patient is an 28-year-old  4 para 4-0-0-4 who presents for evaluation of uterine prolapse and urinary urgency  She has nocturia approximately 3 times every night and needs to have a commode next to her bed in order to avoid without episodic incontinence  She had a 2 Gellhorn in the past which was removed and she was asymptomatic without it for a considerable period of time  She noticed recurrent symptoms of prolapse and has now had a 3 Gellhorn with a short stem replaced  This fell out very quickly and she did not return to the urogynecology office after that point  She does not want to have surgery  She has a large midline cystocele and apical prolapse as well  Options risk and benefits were discussed  She would like to try a different type of pessary  #3 closed ring was inserted  Of note, a 5 closed ring did not open fully at the the vaginal apex  After she left the office, it fell out quickly  We will try a foreclosed ring and then possibly a Gellhorn  We also discussed the addition of medication and she will try Ditropan XL  Of note Detrol LA is not covered by her insurance  The medication was sent to the mail order pharmacy  We will see her back tomorrow to retry a different pessary        The following portions of the patient's history were reviewed and updated as appropriate: allergies, current medications, past family history, past medical history, past social history, past surgical history and problem list     Review of Systems      Objective:      Ht 5' 4" (1 626 m)   Wt 87 5 kg (193 lb) BMI 33 13 kg/m²          Physical Exam  Vitals and nursing note reviewed  Exam conducted with a chaperone present  Constitutional:       Appearance: Normal appearance  She is normal weight  HENT:      Head: Normocephalic and atraumatic  Nose: Nose normal    Eyes:      Conjunctiva/sclera: Conjunctivae normal    Pulmonary:      Effort: Pulmonary effort is normal    Abdominal:      General: Abdomen is flat  Palpations: Abdomen is soft  Genitourinary:     General: Normal vulva  Exam position: Lithotomy position  Vagina: Prolapsed vaginal walls present  Cervix: Normal       Uterus: With uterine prolapse  Adnexa: Right adnexa normal and left adnexa normal       Comments: Large cystocele - #3 closed ring  Musculoskeletal:         General: Normal range of motion  Skin:     General: Skin is warm and dry  Neurological:      General: No focal deficit present  Mental Status: She is alert  Mental status is at baseline  Psychiatric:         Mood and Affect: Mood normal          Behavior: Behavior normal          Thought Content:  Thought content normal          Judgment: Judgment normal

## 2023-02-14 ENCOUNTER — OFFICE VISIT (OUTPATIENT)
Dept: OBGYN CLINIC | Facility: CLINIC | Age: 88
End: 2023-02-14

## 2023-02-14 VITALS — HEIGHT: 64 IN | WEIGHT: 193 LBS | BODY MASS INDEX: 32.95 KG/M2

## 2023-02-14 DIAGNOSIS — N32.81 OVERACTIVE BLADDER DUE TO PROLAPSE OF FEMALE GENITAL ORGAN: ICD-10-CM

## 2023-02-14 DIAGNOSIS — N81.4 UTERINE PROLAPSE: Primary | ICD-10-CM

## 2023-02-14 DIAGNOSIS — N81.9 OVERACTIVE BLADDER DUE TO PROLAPSE OF FEMALE GENITAL ORGAN: ICD-10-CM

## 2023-02-15 ENCOUNTER — OFFICE VISIT (OUTPATIENT)
Dept: NEPHROLOGY | Facility: CLINIC | Age: 88
End: 2023-02-15

## 2023-02-15 VITALS
HEART RATE: 62 BPM | HEIGHT: 64 IN | SYSTOLIC BLOOD PRESSURE: 142 MMHG | WEIGHT: 192 LBS | BODY MASS INDEX: 32.78 KG/M2 | DIASTOLIC BLOOD PRESSURE: 66 MMHG

## 2023-02-15 DIAGNOSIS — M79.89 SWELLING OF LEFT LOWER EXTREMITY: ICD-10-CM

## 2023-02-15 DIAGNOSIS — E66.09 CLASS 1 OBESITY DUE TO EXCESS CALORIES WITH SERIOUS COMORBIDITY AND BODY MASS INDEX (BMI) OF 33.0 TO 33.9 IN ADULT: ICD-10-CM

## 2023-02-15 DIAGNOSIS — N18.30 TYPE 2 DIABETES MELLITUS WITH STAGE 3 CHRONIC KIDNEY DISEASE AND HYPERTENSION (HCC): Primary | ICD-10-CM

## 2023-02-15 DIAGNOSIS — I12.9 TYPE 2 DIABETES MELLITUS WITH STAGE 3 CHRONIC KIDNEY DISEASE AND HYPERTENSION (HCC): Primary | ICD-10-CM

## 2023-02-15 DIAGNOSIS — N81.4 UTERINE PROLAPSE: Primary | ICD-10-CM

## 2023-02-15 DIAGNOSIS — R60.0 EDEMA LEG: ICD-10-CM

## 2023-02-15 DIAGNOSIS — E11.22 TYPE 2 DIABETES MELLITUS WITH STAGE 3 CHRONIC KIDNEY DISEASE AND HYPERTENSION (HCC): Primary | ICD-10-CM

## 2023-02-15 PROBLEM — E66.811 CLASS 1 OBESITY DUE TO EXCESS CALORIES WITH SERIOUS COMORBIDITY AND BODY MASS INDEX (BMI) OF 33.0 TO 33.9 IN ADULT: Status: ACTIVE | Noted: 2023-02-15

## 2023-02-15 PROBLEM — E66.01 OBESITY, MORBID, BMI 40.0-49.9 (HCC): Status: RESOLVED | Noted: 2022-02-10 | Resolved: 2023-02-15

## 2023-02-15 NOTE — PROGRESS NOTES
NEPHROLOGY OFFICE VISIT   Aleksandr Schmitt 80 y o  female MRN: [id number]  2/15/2023    Reason for Visit: Chronic kidney disease    History of Present Illness (HPI):    I had the pleasure of seeing Kitty Casper today in the renal clinic for the continued management of chronic kidney disease  Since our last visit, there has been no ER visits or hospitilizations  He currently has no complaints at this time and is feeling well  Patient denies any chest pain, shortness of breath but having left leg swelling  The last blood work was done on November, which we have reviewed together  She had seen her geriatric team who noticed that she had left leg swelling  She did not notice it prior to this  She reported no recent trauma to the left leg  She was recently transition from Eliquis to Coumadin due to cost  Elevation of the leg appears to have not helped with the swelling much  We will set her up for a Doppler of the left lower extremity to make sure that there is no evidence of a DVT  I am hoping that is less likely given that she is already on anticoagulation        ASSESSMENT and PLAN:    Chronic Kidney Disease Stage IIIB  [facility]  [facility] reviewed  Gil Flores were noted to be symmetrical and normal size   Both kidneys were diffusely echogenic consistent with underlying chronic kidney disease   There was numerous of simple cyst on the left kidney   And there was a right upper pole renal cyst about 4 1 cm that was Bosniak 2, also on the left kidney which is usually a benign   Will check a repeat renal ultrasound, there pelvic cyst which was followed up by a pelvic ultrasound shortly after  --Urinalysis: Trace protein with no evidence of microscopic hematuria  --Proteinuria:  Screen for microalbuminuria  --Baseline creatinine: Low 1's, 1 1-1 4 mg/dL  --Etiology:  Age-related nephron loss, hypertensive nephrosclerosis  --Biopsy Proven:  No  --Status: Renal function remained stable, most recent creatinine 1 2 mg/dL with a GFR 41 mL/min  --Management: Continue current management  --Reducing Cardiovascular Risk Factors:  Simvastatin+ Ezetimibe reduced atherosclerotic events in CKD (SHARP trial); Low dose aspirin safe (if no contraindications exist); smoking is an independent risk factor for Chronic Kidney Disease and progression, strongly recommend smoking cessation     Brain cyst  --following with Neurosurgery  --MRI reviewed and showed stable multiloculated cystic lesion with no associated enhancement or solid component  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   No evidence of obstructive hydrocephalus  --clinically asymptomatic     Diabetes mellitus type 2  --exercise diet and weight loss  --not on any medications  --Hemoglobin 5 6     Hypertension  --amlodipine 5 mg daily, quinapril 20 mg daily  --blood pressure acceptable given her age     Paroxysmal atrial fibrillation  --management as per Cardiology  -- No longer on Eliquis transition to Coumadin     Anticoagulation in chronic kidney disease  --medication: Coumadin  --GFR/Creatinine Clearance: > 35 mL/min  --Indication:  Atrial fibrillation  --recommendation:  See below  --risk of major bleeding increases as level of renal dysfunction worsens, especially for eGFR < 30 cc/min  --non vitamin K oral anticoagulatants shown to be equal equivalent or superior efficacy and safety in comparison to warfarin in the general population (except for patients with mechanical valves)   However no data on the use of these agents in patients with eGFR < 30 cc/min   Non vitamin K anticoagulants and enoxaparin, need to be dose adjusted or voided in moderate Chronic Kidney Disease because they are dependent on the kidneys were elimination   Heparin warfarin are not dependent on the kidney for clearance and does require no dose adjustments in Chronic Kidney Disease    --Warfarin (Coumadin)                Renal elimination: none (hepatic clearance)                Dialysis removal: < 1% (4 hour treatment)                Dosage Recommendation:  Titrate per INR                Preop management:  Would recommend holding 3-5 days regardless of GFR, depending on the indication  --Apixaban (Eliquis)                Renal elimination:  Approximately 27%                Dialysis removal:  7% (4 hour treatment)                Dosage recommendation: GFR 30-50 cc/min 2 5 mg BID, dosage for atrial fibrillation, serum creatinine greater than 1 5 mg/dL and either age greater than 80 years or body weight  less than 60 kg 2 5 mg twice daily   Otherwise 5 mg twice daily    Dosage for DVT:  Avoid use of GFR less than 30 cc/minute   No dose adjustment of GFR greater than 30 cc/minute                 Preoperative management:  Hold for 24-48 hours for GFR greater than 30 cc/min, 36-48 hours for GFR 15-29 cc/minute; 96 hours for GFR less than 15 cc/minute                Reversal:  Somewhat reversible with 4-Factor prothrombin complex concentrate (Andexanet fabby is the reversal agent)  --Rivaroxaban (Xarelto)                Renal elimination:  35%                Dialysis removal: < 1% (4 hour treatment)                Dosage recommendation:  GFR between 30-50 cc/minute:  15 mg daily; GFR less than 30 cc/minute:  Avoid                Preoperative management:  Hold for 24-48 hours for GFR greater than 30 cc/minute; 36-48 hours for GFR between 15 and 29 cc/minute; 96 hours for GFR less than 15 cc/minute                Reversal:  Somewhat reversible with 4-factor prothrombin complex concentrate (Andexanet fabby is a reversal agent)  --Dabigatran (Pradaxa)                Renal elimination:  80%                Dialysis removal:  50% (4 hour treatment), very dialyzable                Dose recommendation:  GFR between 30-50 cc/minute:  75 mg twice a day ; avoid the use if GFR less than 30 cc/minute                Preoperative management:  Hold for 24-48 hours for GFR greater than 60 cc/minute; 48-96 hours for GFR between 15-60 cc/minute;  hours for GFR less than 15 cc/minute                Reversal: Idarucizumab, neutralize is the anticoagulant effect of data get trend in 30 minutes; 6% risk of thrombotic complication  --Edoxaban (Savaysa)                Renal elimination:  50%                Dialysis removal: 9% (4 hour treatment)                Dosage recommendations:  GFR between 30-50 cc/minute 30 mg daily ; avoid the use of GFR less than 30 cc/minute  --for hemodialysis patient started on anticoagulation recommend reducing their intra dialytic heparin dose by at least 50%  --life-threatening bleeding:  Desmopressin IV 0 4 micrograms/kilogram over 10 minutes if there is a concern for uremic bleeding      Obesity  -- BMI 32 9  --Recommend decreasing portion sizes, encouraging healthy choices of fruits and vegetables, consuming healthier snacks and moderation in carbohydrate intake  Exercise recommendations; 3-5 times a week, the American Heart Association recommends 150 minutes a week moderate exercise  --Strongly recommend evaluation by nutritionist  --Overweight and obesity have been associated with increased mortality and morbidity  Associated with a reduction in life expectancy, associated with increased all cause and cardiovascular mortality  --Metabolic risks, including increased risk of diabetes type 2, insulin resistance with hyperinsulinemia (weight loss of 5 to 7% associated with a decreased risk of type 2 diabetes among individuals with prediabetes and weight loss of 15% can lead to dramatic improvement of diabetes in nearly half of people)  Dyslipidemia, hypertension and heart disease are all increased in patients with obesity  Along with increased risk of stroke increased risk of multiple cancer types  Can also be associated with increased renal dysfunction, strongest risk factor for new onset chronic kidney disease    There is also a degree of compensatory hyperfiltration to meet high in the metabolic demands of increased body weight  This can also result in increased increased risk for nephrolithiasis  Left lower extremity edema  -- Check a lower extremity Doppler    Mineral bone disorder-chronic kidney disease  -- Check vitamin D, PTH and phosphorus    PATIENT INSTRUCTIONS:    Patient Instructions   1 )  Low 2 g sodium diet    2 )  Monitor weights at home    3 )  Avoid NSAIDs (ibuprofen, Motrin, Advil, Aleve, naproxen)    4 )  Monitor blood pressure at home, call if blood pressure greater than 150/90 persistently    5 ) I will plan to discuss all results including blood work, and/or imaging at our next visit, unless there is an urgent indication, in which case I will call you earlier  If you have any questions or concerns about your results, please feel free to call our office  6 ) Check US of left leg          Orders Placed This Encounter   Procedures   • Comprehensive metabolic panel     This is a patient instruction: Patient fasting for 8 hours or longer recommended  Standing Status:   Future     Standing Expiration Date:   2/15/2024   • CBC     Standing Status:   Future     Standing Expiration Date:   2/15/2024   • PTH, intact     Standing Status:   Future     Standing Expiration Date:   2/15/2024   • Phosphorus     Standing Status:   Future     Standing Expiration Date:   2/15/2024   • Vitamin D 25 hydroxy     Standing Status:   Future     Standing Expiration Date:   2/15/2024       OBJECTIVE:  Current Weight: Weight - Scale: 87 1 kg (192 lb)  Vitals:    02/15/23 1114   BP: 142/66   BP Location: Left arm   Patient Position: Sitting   Cuff Size: Standard   Pulse: 62   Weight: 87 1 kg (192 lb)   Height: 5' 4" (1 626 m)    Body mass index is 32 96 kg/m²  REVIEW OF SYSTEMS:    Review of Systems   Constitutional: Negative for activity change and fever  Respiratory: Negative for cough, chest tightness, shortness of breath and wheezing  Cardiovascular: Positive for leg swelling  Negative for chest pain  Gastrointestinal: Negative for abdominal pain, diarrhea, nausea and vomiting  Endocrine: Negative for polyuria  Genitourinary: Negative for difficulty urinating, dysuria, flank pain, frequency and urgency  Skin: Negative for rash  Neurological: Negative for dizziness, syncope, light-headedness and headaches  PHYSICAL EXAM:      Physical Exam  Vitals and nursing note reviewed  Exam conducted with a chaperone present  Constitutional:       General: She is not in acute distress  Appearance: She is well-developed  HENT:      Head: Normocephalic and atraumatic  Eyes:      General: No scleral icterus  Conjunctiva/sclera: Conjunctivae normal       Pupils: Pupils are equal, round, and reactive to light  Cardiovascular:      Rate and Rhythm: Normal rate and regular rhythm  Heart sounds: S1 normal and S2 normal  No murmur heard  No friction rub  No gallop  Pulmonary:      Effort: Pulmonary effort is normal  No respiratory distress  Breath sounds: Normal breath sounds  No wheezing or rales  Abdominal:      General: Bowel sounds are normal       Palpations: Abdomen is soft  Tenderness: There is no abdominal tenderness  There is no rebound  Musculoskeletal:         General: Normal range of motion  Cervical back: Normal range of motion and neck supple  Left lower leg: Edema present  Skin:     Findings: No rash  Neurological:      Mental Status: She is alert and oriented to person, place, and time     Psychiatric:         Behavior: Behavior normal          Medications:    Current Outpatient Medications:   •  ALPRAZolam (XANAX) 0 5 mg tablet, Take by mouth in the morning  , Disp: , Rfl:   •  amLODIPine (NORVASC) 5 mg tablet, Take 5 mg by mouth daily, Disp: , Rfl:   •  brimonidine (ALPHAGAN P) 0 1 %, 1 drop in the morning Each eye, Disp: , Rfl:   •  Calcium Citrate (CITRACAL PO), Take by mouth every morning, Disp: , Rfl:   •  Cholecalciferol (Vitamin D3) 1 25 MG (19160 UT) CAPS, Take 1,000 Units by mouth in the morning, Disp: , Rfl:   •  CLINDAMYCIN HCL PO, Take 600 mg by mouth as needed Prior to invasive procedures, Disp: , Rfl:   •  Cyanocobalamin (B-12) 5000 MCG CAPS, Take by mouth, Disp: , Rfl:   •  latanoprost (XALATAN) 0 005 % ophthalmic solution, Administer 1 drop to both eyes daily at bedtime  , Disp: , Rfl:   •  Multiple Vitamins-Minerals (PRESERVISION AREDS 2+MULTI VIT PO), Take 2 tablets by mouth daily  , Disp: , Rfl:   •  multivitamin (THERAGRAN) TABS, Take 1 tablet by mouth every morning  , Disp: , Rfl:   •  pravastatin (PRAVACHOL) 40 mg tablet, Take 40 mg by mouth daily at bedtime, Disp: , Rfl:   •  quinapril (ACCUPRIL) 10 mg tablet, Take 20 mg by mouth daily  , Disp: , Rfl:   •  warfarin (COUMADIN) 0 5 mg tablet, Take 0 5 mg by mouth daily, Disp: , Rfl:   •  Acetaminophen 325 MG CAPS, Take 650 mg by mouth every 6 (six) hours, Disp: , Rfl:   •  ascorbic acid (VITAMIN C) 500 mg tablet, Take 500 mg by mouth every morning (Patient not taking: Reported on 2/10/2022), Disp: , Rfl:   •  clotrimazole-betamethasone (LOTRISONE) 1-0 05 % cream, Apply topically 2 (two) times a day (Patient not taking: Reported on 9/21/2021), Disp: 30 g, Rfl: 0  •  omeprazole (PriLOSEC) 40 MG capsule, Take 40 mg by mouth daily at bedtime (Patient not taking: Reported on 9/21/2021), Disp: , Rfl:   •  oxybutynin (DITROPAN-XL) 5 mg 24 hr tablet, Take 1 tablet (5 mg total) by mouth daily at bedtime, Disp: 90 tablet, Rfl: 12  •  Polyethyl Glycol-Propyl Glycol (SYSTANE OP), Apply to eye 4 (four) times a day Each eye (Patient not taking: Reported on 9/21/2021), Disp: , Rfl:   •  Propylene Glycol 0 6 % SOLN, 4 (four) times a day Each eye , Disp: , Rfl:     Laboratory Results:        Invalid input(s): ALBUMIN    Results for orders placed or performed in visit on 08/16/22   Wound culture and Gram stain    Specimen: Vulva;  Wound   Result Value Ref Range    Wound Culture 2+ Growth of Klebsiella pneumoniae (A)     Wound Culture 1+ Growth of     Gram Stain Result Rare Gram positive cocci in pairs (A)     Gram Stain Result Rare Gram negative rods (A)     Gram Stain Result No polys seen (A)        Susceptibility    Klebsiella pneumoniae - XIOMARA     ZID Performed Yes       Amoxicillin + Clavulanate <=8/4 Susceptible ug/ml     Ampicillin ($$) >16 00 Resistant ug/ml     Ampicillin + Sulbactam ($) 8/4 Susceptible ug/ml     Aztreonam ($$$)  <=4 Susceptible ug/ml     Cefazolin ($) <=2 00 Susceptible ug/ml     Ciprofloxacin ($)  <=0 25 Susceptible ug/ml     Gentamicin ($$) <=2 Susceptible ug/ml     Levofloxacin ($) <=0 50 Susceptible ug/ml     Tetracycline <=4 Susceptible ug/ml     Tobramycin ($) <=2 Susceptible ug/ml     Trimethoprim + Sulfamethoxazole ($$$) <=0 5/9 5 Susceptible ug/ml

## 2023-02-15 NOTE — PROGRESS NOTES
Assessment/Plan:         Diagnoses and all orders for this visit:    Uterine prolapse    Overactive bladder due to prolapse of female genital organ          Subjective:      Patient ID: Eugene Phan is a 80 y o  female  The patient is an 41-year-old  4 para 4-0-0-4 who presents with longstanding uterine prolapse, which has over the years become increasingly difficult to treat with pessaries  She had a #2 Gellhorn for many years and did well with that until an incidental finding on a CAT scan revealed a thickened endometrium  The Gellhorn was found to be causing some vaginal irritation and was removed for a vaginal ultrasound; the patient felt fine without the Gellhorn and elected not to have it replaced  The endometrial thickening and small ovarian cyst proved to be stable and patient did not need any surgical surgical intervention  The patient was fine for approximately 2 years when she developed increasing symptoms of urgency and frequency  She has difficulty emptying her bladder completely and initiating the stream   The midline cystocele and prolapse have become progressively worse  She arranged a consultation with urogynecology at [facility] and a #3 Gellhorn pessary was placed  It fell out shortly after her return home for her visit  The chart documented that if that pessary did not work, that the patient would most likely not be a good candidate for any other pessary  Several different sizes and shapes of pessary were attempted here in the office, and each fell out fairly quickly  Options risk and benefits were discussed with the patient and her   She is very uncomfortable with the amount of prolapse that she has now and has nocturia at least 3 times every night     has done research on various surgical interventions and believes she would very likely benefit from surgery and would like her to be evaluated by urogynecologist   The patient is less sure about surgery, but she does understand at this point there may not be any other treatment to offer her which would resolve her symptoms  A referral to urogynecology at [place] [id number] will be generated  The following portions of the patient's history were reviewed and updated as appropriate: allergies, current medications, past family history, past medical history, past social history, past surgical history and problem list     Review of Systems   Constitutional: Negative for chills, diaphoresis, fatigue, fever and unexpected weight change  HENT: Negative for congestion, sinus pressure, sinus pain, tinnitus and trouble swallowing  Eyes: Negative for visual disturbance  Respiratory: Negative for cough, chest tightness and shortness of breath  Cardiovascular: Negative for chest pain, palpitations and leg swelling  Gastrointestinal: Negative for abdominal distention, abdominal pain, anal bleeding, constipation, diarrhea, nausea, rectal pain and vomiting  Endocrine: Negative for heat intolerance  Genitourinary: Negative for difficulty urinating, dysuria, flank pain, frequency, genital sores, hematuria and urgency  Musculoskeletal: Negative for arthralgias, back pain and joint swelling  Skin: Negative for rash  Allergic/Immunologic: Negative for environmental allergies and food allergies  Neurological: Negative for headaches  Hematological: Negative for adenopathy  Does not bruise/bleed easily  Psychiatric/Behavioral: Negative for decreased concentration and dysphoric mood  The patient is not nervous/anxious  Objective:      Ht 5' 4" (1 626 m)   Wt 87 5 kg (193 lb)   BMI 33 13 kg/m²          Physical Exam  Vitals and nursing note reviewed  Exam conducted with a chaperone present  Constitutional:       Appearance: Normal appearance  She is normal weight  HENT:      Head: Normocephalic and atraumatic        Nose: Nose normal    Eyes:      Conjunctiva/sclera: Conjunctivae normal  Pulmonary:      Effort: Pulmonary effort is normal    Abdominal:      General: Abdomen is flat  Palpations: Abdomen is soft  Genitourinary:     General: Normal vulva  Exam position: Lithotomy position  Vagina: Prolapsed vaginal walls present  Cervix: Normal       Uterus: With uterine prolapse  Adnexa: Right adnexa normal and left adnexa normal       Comments: Large midline cystocele  Musculoskeletal:         General: Normal range of motion  Skin:     General: Skin is warm and dry  Neurological:      General: No focal deficit present  Mental Status: She is alert  Mental status is at baseline  Psychiatric:         Mood and Affect: Mood normal          Behavior: Behavior normal          Thought Content:  Thought content normal          Judgment: Judgment normal

## 2023-02-15 NOTE — PATIENT INSTRUCTIONS
1  )  Low 2 g sodium diet    2 )  Monitor weights at home    3 )  Avoid NSAIDs (ibuprofen, Motrin, Advil, Aleve, naproxen)    4 )  Monitor blood pressure at home, call if blood pressure greater than 150/90 persistently    5 ) I will plan to discuss all results including blood work, and/or imaging at our next visit, unless there is an urgent indication, in which case I will call you earlier  If you have any questions or concerns about your results, please feel free to call our office      6 ) Check US of left leg

## 2023-02-19 ENCOUNTER — HOSPITAL ENCOUNTER (OUTPATIENT)
Dept: VASCULAR ULTRASOUND | Facility: HOSPITAL | Age: 88
Discharge: HOME | End: 2023-02-19
Attending: INTERNAL MEDICINE

## 2023-02-19 DIAGNOSIS — R60.0 EDEMA LEG: ICD-10-CM

## 2023-02-19 DIAGNOSIS — M79.89 SWELLING OF LEFT LOWER EXTREMITY: ICD-10-CM

## 2023-04-28 ENCOUNTER — APPOINTMENT (OUTPATIENT)
Dept: LAB | Facility: CLINIC | Age: 88
End: 2023-04-28

## 2023-04-28 DIAGNOSIS — G93.0 CYST OF BRAIN: ICD-10-CM

## 2023-04-28 LAB
BUN SERPL-MCNC: 41 MG/DL (ref 5–25)
CREAT SERPL-MCNC: 1.25 MG/DL (ref 0.6–1.3)
GFR SERPL CREATININE-BSD FRML MDRD: 38 ML/MIN/1.73SQ M

## 2023-04-29 ENCOUNTER — HOSPITAL ENCOUNTER (OUTPATIENT)
Dept: MRI IMAGING | Facility: HOSPITAL | Age: 88
Discharge: HOME | End: 2023-04-29

## 2023-04-29 DIAGNOSIS — G93.0 CYST OF BRAIN: ICD-10-CM

## 2023-04-29 RX ADMIN — GADOBUTROL 8 ML: 604.72 INJECTION INTRAVENOUS at 10:30

## 2023-05-01 ENCOUNTER — TELEPHONE (OUTPATIENT)
Dept: NEUROSURGERY | Facility: CLINIC | Age: 88
End: 2023-05-01

## 2023-05-01 NOTE — TELEPHONE ENCOUNTER
FOLLOW UP SNPX JUAN CARLOS/DR HARRIS SCHED WED 4/3 @ [facility]--  MRI BRAIN SL 4/29/23- CALLED FOR READ 10:07am

## 2023-05-12 ENCOUNTER — OFFICE VISIT (OUTPATIENT)
Dept: NEUROSURGERY | Facility: CLINIC | Age: 88
End: 2023-05-12

## 2023-05-12 VITALS
BODY MASS INDEX: 32.78 KG/M2 | HEIGHT: 64 IN | DIASTOLIC BLOOD PRESSURE: 71 MMHG | SYSTOLIC BLOOD PRESSURE: 131 MMHG | HEART RATE: 62 BPM | WEIGHT: 192 LBS | TEMPERATURE: 98.3 F | RESPIRATION RATE: 18 BRPM

## 2023-05-12 DIAGNOSIS — G93.0 INTRAVENTRICULAR CYST OF BRAIN: Primary | ICD-10-CM

## 2023-05-12 NOTE — PROGRESS NOTES
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, here today with her  annual follow up  Had brain MRI which shows stable multiloculated intraventricular cystic lesions without much mass effect or ventriculomegaly, no transependymal edema  Patient continues having word finding problems with expressive aphasia, some memory issues, denies any headache, seizures, nausea, vomiting, vision issues, or swallowing problems  Chronic gait instability uses walker for ambulation  Has urinary incontinence, but attributed due to gynecological problem  Patient did speech therapy but without much improvement in her speech  Exam-Alert and oriented to person, place and year/month, PERRL, EOMI 2 mm conj bilaterally  Carolann  Finger to nose tests-slight  dysmetria noted  No drift bilaterally  Expressive aphasias with word findings issues  Strength 5/5 and sensation to LT intact bilaterally  DTR 2+ no clonus bilaterally  unstable gait ( walker dependent)  Hx, PEx, and images reviewed with the patient and her   Management plan discussed  May shows a stable multiloculated cystic lesions in the ventricles without remarkable ventriculomegaly  I do not think the cystic lesions are related to her speech issues  Given her age and the stable cystic lesion, no need for regular annual follow-up images required  Patient is advised to call office or go to emergency room with development of new neurological symptoms, otherwise follow-up with speech therapy  All questions and concerns were answered to patient satisfaction  Both patient and her  expressed their understanding's and agreed with the plan  Plan:   1  I don't think patient's expressive speech issues is related to intraventricular cystic lesions  2   Given Stable cystic brain  lesions in "ventricles over 2-3 yrs, and advanced age, no procedure or regular follow up imaging's required  3  Advised to call office or go to ER with development of new neurological symptoms  4  Otherwise follow up on PRN basis  5  Call with questions or cocnerns        I have spent a total time of 45 minutes on 05/12/23 in caring for this patient including Diagnostic results, Prognosis, Risks and benefits of tx options, Instructions for management, Patient and family education, Importance of tx compliance, Risk factor reductions, Impressions, Counseling / Coordination of care, Documenting in the medical record, Reviewing / ordering tests, medicine, procedures   and Obtaining or reviewing history    Chief Complaint   Patient presents with   • Follow-up     FOLLOW  W Yvette Martin/DR WENDI VALERIO [facility]  MRI BRAIN SL 4/29/23         C/C: \" Here for multiloculated ventricular cystic lesions annual follow up\"    HPI:    All patient's medical histories were reviewed and updated as appropriate: Allergies, current medication list, past medical history, past surgical history, family history, social history, and current medical lists    Patient is 80years old pleasant woman here today with her  for annual follow-up for multi loculated intraventricular cystic lesions, imaging shows a stable findings when compared to images done in 2022  Patient reports no improvement with her expressive speech issues, and also some memory problems  Chronic gait issues , uses walker for ambulation  No headache, vision issues, nausea, vomiting, seizures, vertigo, swallowing problem  Denies any weakness, numbness or paresthesias extremities  Patient completed his speech therapy, and is still practicing on reading and other speech exercise materials  No fever, chills, rigors, cough or chest pain  REVIEW OF SYSTEMS  Review of system personally reviewed and updated  Review of Systems   Constitutional: Negative  HENT: Negative      Eyes: " Negative  H/o Glaucoma    Wears glasses   Respiratory: Negative  Cardiovascular: Negative  H/o HTN/Hyperlipidemia   Gastrointestinal: Negative  Endocrine: Negative  H/o Stage 3 Kidney Disease   Genitourinary: Positive for frequency (during the day and nocturia x3-4) and urgency  Musculoskeletal: Negative for back pain (left knee pain) and gait problem (uses walker as precaution)  Currently in occupational therapy   Skin: Negative  Allergic/Immunologic: Negative  Neurological: Positive for speech difficulty (unchanged since last visit, slurred speech, in speech therapy)  Negative for dizziness, seizures, syncope, weakness, numbness and headaches  6 3 CM BRAIN CYST   Hematological: Bruises/bleeds easily  On Xarelto   Psychiatric/Behavioral: Positive for confusion (unchanged since last visit, forgetful at times) and sleep disturbance (nocturia x3-4)  ROS obtained by MA  Reviewed  See HPI       Meds/Allergies     Current Outpatient Medications   Medication Sig Dispense Refill   • ALPRAZolam (XANAX) 0 5 mg tablet Take by mouth in the morning       • amLODIPine (NORVASC) 5 mg tablet Take 5 mg by mouth daily     • brimonidine (ALPHAGAN P) 0 1 % 1 drop in the morning Each eye     • Calcium Citrate (CITRACAL PO) Take by mouth every morning     • Cholecalciferol (Vitamin D3) 1 25 MG (04770 UT) CAPS Take 1,000 Units by mouth in the morning     • CLINDAMYCIN HCL PO Take 600 mg by mouth as needed Prior to invasive procedures     • Cyanocobalamin (B-12) 5000 MCG CAPS Take by mouth     • latanoprost (XALATAN) 0 005 % ophthalmic solution Administer 1 drop to both eyes daily at bedtime       • Multiple Vitamins-Minerals (PRESERVISION AREDS 2+MULTI VIT PO) Take 2 tablets by mouth daily       • Polyethyl Glycol-Propyl Glycol (SYSTANE OP) Apply to eye 4 (four) times a day Each eye     • pravastatin (PRAVACHOL) 40 mg tablet Take 40 mg by mouth daily at bedtime     • "quinapril (ACCUPRIL) 10 mg tablet Take 20 mg by mouth daily       • warfarin (COUMADIN) 0 5 mg tablet Take 0 5 mg by mouth daily     • Acetaminophen 325 MG CAPS Take 650 mg by mouth every 6 (six) hours     • ascorbic acid (VITAMIN C) 500 mg tablet Take 500 mg by mouth every morning (Patient not taking: Reported on 2/10/2022)     • clotrimazole-betamethasone (LOTRISONE) 1-0 05 % cream Apply topically 2 (two) times a day (Patient not taking: Reported on 9/21/2021) 30 g 0   • multivitamin (THERAGRAN) TABS Take 1 tablet by mouth every morning       • omeprazole (PriLOSEC) 40 MG capsule Take 40 mg by mouth daily at bedtime (Patient not taking: Reported on 9/21/2021)     • oxybutynin (DITROPAN-XL) 5 mg 24 hr tablet Take 1 tablet (5 mg total) by mouth daily at bedtime 90 tablet 12   • Propylene Glycol 0 6 % SOLN 4 (four) times a day Each eye        No current facility-administered medications for this visit         Allergies   Allergen Reactions   • Hydrocodone-Acetaminophen Abdominal Pain   • Erythromycin GI Intolerance     \"stomach burning\", bruises   • Minocycline GI Intolerance     \"stomach burning\", bruises   • Penicillins GI Intolerance     \"burning stomach\", bruising   • Propoxyphene GI Intolerance   • Tetracycline GI Intolerance     \"stomach burning\"-bruising   • Vicodin [Hydrocodone-Acetaminophen] GI Intolerance     \"stomach burning\" ,nausea   • Prednisone    • Avelox [Moxifloxacin] GI Intolerance     Avoids d/t N/V   • Percocet [Oxycodone-Acetaminophen] GI Intolerance     Avoids d/t N/V   • Sulfa Antibiotics Rash       PAST HISTORY    Past Medical History:   Diagnosis Date   • Arthritis    • Borderline diabetes     watches diet   • Diabetes mellitus ([facility] [address] )    • Dry eye syndrome, bilateral    • Full dentures    • Gastric ulcer     dx in 9/2017   • Glaucoma     had laser   • History of mammogram 2018   • Hyperlipidemia     controlled   • Hypertension     controlled   • Irregular heart beat 09/2017    \"pre-mature\" " "beat-saw cardiologist-12/17-Holter monitor 1/18   • Ovarian cyst 07/06/2018   • Papanicolaou smear for cervical cancer screening 06/05/2015    Neg    • PONV (postoperative nausea and vomiting)    • Presence of intracervical pessary    • Wears glasses        Past Surgical History:   Procedure Laterality Date   • APPENDECTOMY      age 6   • COLONOSCOPY  09/13/2017   • DILATION AND CURETTAGE OF UTERUS     • EGD  01/22/2018    Ulcers healed   • EGD AND COLONOSCOPY     • ESOPHAGOGASTRIC FUNDOPLASTY  09/13/2017   • JOINT REPLACEMENT Bilateral     knees   • ROTATOR CUFF REPAIR Left     titanium screw   • TONSILLECTOMY         Social History     Tobacco Use   • Smoking status: Never   • Smokeless tobacco: Never   Vaping Use   • Vaping Use: Never used   Substance Use Topics   • Alcohol use: Yes     Comment: occ   • Drug use: No       Family History   Problem Relation Age of Onset   • Cancer Mother         breast   • Melanoma Mother         Malignant, internal melanoma    • Heart disease Brother         CHF         Above history personally reviewed  EXAM    Vitals:Blood pressure 131/71, pulse 62, temperature 98 3 °F (36 8 °C), resp  rate 18, height 5' 4\" (1 626 m), weight 87 1 kg (192 lb)  ,Body mass index is 32 96 kg/m²  Physical Exam  Constitutional:       Appearance: Normal appearance  HENT:      Head: Normocephalic and atraumatic  Eyes:      Extraocular Movements: Extraocular movements intact  Pupils: Pupils are equal, round, and reactive to light  Cardiovascular:      Rate and Rhythm: Normal rate  Pulses: Normal pulses  Pulmonary:      Effort: Pulmonary effort is normal    Musculoskeletal:         General: Normal range of motion  Cervical back: Normal range of motion  Neurological:      General: No focal deficit present  Mental Status: She is alert and oriented to person, place, and time  GCS: GCS eye subscore is 4  GCS verbal subscore is 5  GCS motor subscore is 6        " Cranial Nerves: Cranial nerve deficit present  Sensory: Sensation is intact  Motor: Motor function is intact  Coordination: Finger-Nose-Finger Test normal       Gait: Gait abnormal       Deep Tendon Reflexes: Reflexes are normal and symmetric  Reflex Scores:       Tricep reflexes are 2+ on the right side and 2+ on the left side  Bicep reflexes are 2+ on the right side and 2+ on the left side  Brachioradialis reflexes are 2+ on the right side and 2+ on the left side  Patellar reflexes are 2+ on the right side and 2+ on the left side  Achilles reflexes are 2+ on the right side and 2+ on the left side  Neurologic Exam     Mental Status   Oriented to person, place, and time  Speech: (Expressive issues with word finding problem)  Level of consciousness: alert    Cranial Nerves     CN III, IV, VI   Pupils are equal, round, and reactive to light  Right pupil: Size: 2 mm  Shape: regular  Reactivity: brisk  Left pupil: Size: 2 mm  Shape: regular  Reactivity: brisk  CN VII   Facial expression full, symmetric       CN XI   CN XI normal      Motor Exam   Muscle bulk: normal  Overall muscle tone: normal  Right arm tone: normal  Left arm tone: normal  Right arm pronator drift: absent  Left arm pronator drift: absent  Right leg tone: normal  Left leg tone: normal    Sensory Exam   Light touch normal      Gait, Coordination, and Reflexes     Coordination   Finger to nose coordination: normal    Reflexes   Right brachioradialis: 2+  Left brachioradialis: 2+  Right biceps: 2+  Left biceps: 2+  Right triceps: 2+  Left triceps: 2+  Right patellar: 2+  Left patellar: 2+  Right achilles: 2+  Left achilles: 2+  Right : 2+  Left : 2+  Right Chew: absent  Left Chew: absent  Right ankle clonus: absent  Left pendular knee jerk: absent        MEDICAL DECISION MAKING    Imaging Studies:     MRI brain with and without contrast    Result Date: 5/1/2023  Narrative: MRI BRAIN WITH AND WITHOUT CONTRAST INDICATION: G93 0: Cerebral cysts  COMPARISON: 3/20/2022 and 2/3/2022, MRI exams  CT dated 11/8/2021  TECHNIQUE: Multiplanar, multisequence imaging of the brain was performed before and after gadolinium administration  IV Contrast:  8 mL of Gadobutrol injection (SINGLE-DOSE) IMAGE QUALITY:   Diagnostic  FINDINGS: BRAIN PARENCHYMA: There is no mass identified within the cerebral hemispheres  Stable mild white matter change within the periventricular white matter suggestive of mild chronic microangiopathy  Mild parenchymal volume loss within the cerebral hemispheres, age-appropriate  No abnormal enhancement  Diffusion imaging is unremarkable with no signs of acute ischemia  Stable appearance of the brainstem and cerebellum  There is thinning of the corpus callosum present  Postcontrast imaging of the brain demonstrates no abnormal enhancement  VENTRICLES: Stable ventricular system with a multiloculated cystic lesion identified within the body of the right lateral ventricle from anterior to posterior with slight extension across the midline  This is unchanged in size and configuration and there  is no enhancing component  Normal aqueduct and fourth ventricle  Stable mildly enlarged third ventricle  SELLA AND PITUITARY GLAND:  Normal  ORBITS:  Normal  PARANASAL SINUSES:  Normal  VASCULATURE:  Evaluation of the major intracranial vasculature demonstrates appropriate flow voids  CALVARIUM AND SKULL BASE:  Normal  EXTRACRANIAL SOFT TISSUES:  Normal      Impression: Stable MRI of the brain  Multicystic lesion within the ventricular system, primarily within the body of the right lateral ventricle is unchanged  Mild parenchymal volume loss and chronic microangiopathic change   Workstation performed: [id number]       I have personally reviewed pertinent reports   , I have personally reviewed pertinent films in PACS and I have personally reviewed pertinent films in PACS with a Radiologist

## 2023-05-23 ENCOUNTER — ANESTHESIA EVENT (OUTPATIENT)
Dept: PERIOP | Facility: HOSPITAL | Age: 88
End: 2023-05-23
Payer: MEDICARE

## 2023-05-31 NOTE — PRE-PROCEDURE INSTRUCTIONS
Pre-Surgery Instructions:   Medication Instructions   • ALPRAZolam (XANAX) 0 5 mg tablet Uses PRN @ HS   • amLODIPine (NORVASC) 5 mg tablet Take day of surgery  • brimonidine (ALPHAGAN P) 0 1 % Take day of surgery  • Calcium Citrate (CITRACAL PO) Stop taking 7 days prior to surgery  • CLINDAMYCIN HCL PO Uses PRN- DO NOT take day of surgery   • Cyanocobalamin (B-12) 5000 MCG CAPS Stop taking 7 days prior to surgery  • latanoprost (XALATAN) 0 005 % ophthalmic solution Take day of surgery  • Multiple Vitamins-Minerals (PRESERVISION AREDS 2+MULTI VIT PO) Stop taking 7 days prior to surgery  • Polyethyl Glycol-Propyl Glycol (SYSTANE OP) Uses PRN- DO NOT take day of surgery   • pravastatin (PRAVACHOL) 40 mg tablet Take night before surgery   • quinapril (ACCUPRIL) 10 mg tablet Hold day of surgery  • warfarin (COUMADIN) 0 5 mg tablet Stop taking 5 days prior to surgery  per CC     Spoke with pts  and daughter via phone  Advised hospital location will call with arrival time night before surgery  NPO after midnight the night before surgery, including chewing gum and hard candy  A small sip of water is allowed to take approved medications the morning of surgery  Instructed to leave contacts/jewelery/valuables at home  Ok to wear dentures, glasses and hearing aides into the hospital - they will be removed for surgery  No smoking or alcohol consumption 24 hours prior to surgery  Avoid all Aspirin products and OTC Vit/Supp 1 week prior to surgery and avoid NSAIDs 3 days prior to surgery per anesthesia instructions  Tylenol ok to take prn  Showering instructions reviewed for night before and morning of surgery using surgical soap or antibacterial soap  Patients family verbalized understanding of all of the above, including medication instructions

## 2023-06-06 ENCOUNTER — HOSPITAL ENCOUNTER (OUTPATIENT)
Facility: HOSPITAL | Age: 88
Setting detail: OUTPATIENT SURGERY
Discharge: HOME | End: 2023-06-06
Attending: OBSTETRICS & GYNECOLOGY | Admitting: OBSTETRICS & GYNECOLOGY
Payer: MEDICARE

## 2023-06-06 ENCOUNTER — ANESTHESIA (OUTPATIENT)
Dept: PERIOP | Facility: HOSPITAL | Age: 88
End: 2023-06-06
Payer: MEDICARE

## 2023-06-06 VITALS
RESPIRATION RATE: 18 BRPM | OXYGEN SATURATION: 98 % | WEIGHT: 191 LBS | TEMPERATURE: 97.2 F | SYSTOLIC BLOOD PRESSURE: 149 MMHG | HEIGHT: 64 IN | DIASTOLIC BLOOD PRESSURE: 67 MMHG | BODY MASS INDEX: 32.61 KG/M2 | HEART RATE: 56 BPM

## 2023-06-06 DIAGNOSIS — N81.11 CYSTOCELE, MIDLINE: Primary | ICD-10-CM

## 2023-06-06 PROBLEM — N81.2 INCOMPLETE UTEROVAGINAL PROLAPSE: Status: ACTIVE | Noted: 2023-06-06

## 2023-06-06 PROBLEM — N81.6 RECTOCELE: Status: ACTIVE | Noted: 2023-06-06

## 2023-06-06 PROBLEM — N39.3 STRESS INCONTINENCE (FEMALE) (MALE): Status: ACTIVE | Noted: 2023-06-06

## 2023-06-06 PROBLEM — N36.41 HYPERMOBILITY OF URETHRA: Status: ACTIVE | Noted: 2023-06-06

## 2023-06-06 LAB
APTT PPP: 24 SECONDS (ref 23–37)
GLUCOSE SERPL-MCNC: 119 MG/DL (ref 65–140)
INR PPP: 1.43 (ref 0.84–1.19)
PROTHROMBIN TIME: 17.4 SECONDS (ref 11.6–14.5)

## 2023-06-06 PROCEDURE — 85730 THROMBOPLASTIN TIME PARTIAL: CPT | Performed by: OBSTETRICS & GYNECOLOGY

## 2023-06-06 PROCEDURE — 85610 PROTHROMBIN TIME: CPT | Performed by: OBSTETRICS & GYNECOLOGY

## 2023-06-06 PROCEDURE — C1771 REP DEV, URINARY, W/SLING: HCPCS | Performed by: OBSTETRICS & GYNECOLOGY

## 2023-06-06 PROCEDURE — 82948 REAGENT STRIP/BLOOD GLUCOSE: CPT

## 2023-06-06 DEVICE — SINGLE INCISION SLING SYSTEM
Type: IMPLANTABLE DEVICE | Status: FUNCTIONAL
Brand: ALTIS

## 2023-06-06 RX ORDER — CEFAZOLIN SODIUM 2 G/50ML
2000 SOLUTION INTRAVENOUS ONCE
Status: COMPLETED | OUTPATIENT
Start: 2023-06-06 | End: 2023-06-06

## 2023-06-06 RX ORDER — ACETAMINOPHEN 325 MG/1
975 TABLET ORAL ONCE
Status: COMPLETED | OUTPATIENT
Start: 2023-06-06 | End: 2023-06-06

## 2023-06-06 RX ORDER — ONDANSETRON 2 MG/ML
INJECTION INTRAMUSCULAR; INTRAVENOUS AS NEEDED
Status: DISCONTINUED | OUTPATIENT
Start: 2023-06-06 | End: 2023-06-06

## 2023-06-06 RX ORDER — SENNOSIDES 8.6 MG
1300 CAPSULE ORAL EVERY 8 HOURS PRN
Qty: 30 TABLET | Refills: 0
Start: 2023-06-06

## 2023-06-06 RX ORDER — SODIUM CHLORIDE, SODIUM LACTATE, POTASSIUM CHLORIDE, CALCIUM CHLORIDE 600; 310; 30; 20 MG/100ML; MG/100ML; MG/100ML; MG/100ML
125 INJECTION, SOLUTION INTRAVENOUS CONTINUOUS
Status: DISCONTINUED | OUTPATIENT
Start: 2023-06-06 | End: 2023-06-06 | Stop reason: HOSPADM

## 2023-06-06 RX ORDER — ACETAMINOPHEN 325 MG/1
975 TABLET ORAL EVERY 6 HOURS PRN
Status: DISCONTINUED | OUTPATIENT
Start: 2023-06-06 | End: 2023-06-06 | Stop reason: HOSPADM

## 2023-06-06 RX ORDER — PROPOFOL 10 MG/ML
INJECTION, EMULSION INTRAVENOUS AS NEEDED
Status: DISCONTINUED | OUTPATIENT
Start: 2023-06-06 | End: 2023-06-06

## 2023-06-06 RX ORDER — DOCUSATE SODIUM 100 MG/1
100 CAPSULE, LIQUID FILLED ORAL 2 TIMES DAILY
Status: DISCONTINUED | OUTPATIENT
Start: 2023-06-06 | End: 2023-06-06 | Stop reason: HOSPADM

## 2023-06-06 RX ORDER — HEPARIN SODIUM 5000 [USP'U]/ML
5000 INJECTION, SOLUTION INTRAVENOUS; SUBCUTANEOUS ONCE
Status: COMPLETED | OUTPATIENT
Start: 2023-06-06 | End: 2023-06-06

## 2023-06-06 RX ORDER — GABAPENTIN 400 MG/1
400 CAPSULE ORAL ONCE
Status: COMPLETED | OUTPATIENT
Start: 2023-06-06 | End: 2023-06-06

## 2023-06-06 RX ORDER — MAGNESIUM HYDROXIDE 1200 MG/15ML
LIQUID ORAL AS NEEDED
Status: DISCONTINUED | OUTPATIENT
Start: 2023-06-06 | End: 2023-06-06 | Stop reason: HOSPADM

## 2023-06-06 RX ORDER — PHENAZOPYRIDINE HYDROCHLORIDE 100 MG/1
100 TABLET, FILM COATED ORAL ONCE
Status: COMPLETED | OUTPATIENT
Start: 2023-06-06 | End: 2023-06-06

## 2023-06-06 RX ORDER — ONDANSETRON 2 MG/ML
4 INJECTION INTRAMUSCULAR; INTRAVENOUS ONCE AS NEEDED
Status: DISCONTINUED | OUTPATIENT
Start: 2023-06-06 | End: 2023-06-06 | Stop reason: HOSPADM

## 2023-06-06 RX ORDER — DOCUSATE SODIUM 100 MG/1
100 CAPSULE, LIQUID FILLED ORAL 2 TIMES DAILY
Status: DISCONTINUED | OUTPATIENT
Start: 2023-06-06 | End: 2023-06-06

## 2023-06-06 RX ORDER — FENTANYL CITRATE/PF 50 MCG/ML
25 SYRINGE (ML) INJECTION
Status: DISCONTINUED | OUTPATIENT
Start: 2023-06-06 | End: 2023-06-06 | Stop reason: HOSPADM

## 2023-06-06 RX ORDER — POLYETHYLENE GLYCOL 3350 17 G/17G
17 POWDER, FOR SOLUTION ORAL DAILY
Refills: 0
Start: 2023-06-06

## 2023-06-06 RX ORDER — FENTANYL CITRATE 50 UG/ML
INJECTION, SOLUTION INTRAMUSCULAR; INTRAVENOUS AS NEEDED
Status: DISCONTINUED | OUTPATIENT
Start: 2023-06-06 | End: 2023-06-06

## 2023-06-06 RX ORDER — ONDANSETRON 2 MG/ML
4 INJECTION INTRAMUSCULAR; INTRAVENOUS EVERY 6 HOURS PRN
Status: DISCONTINUED | OUTPATIENT
Start: 2023-06-06 | End: 2023-06-06 | Stop reason: HOSPADM

## 2023-06-06 RX ORDER — DOCUSATE SODIUM 100 MG/1
CAPSULE, LIQUID FILLED ORAL
Status: COMPLETED
Start: 2023-06-06 | End: 2023-06-06

## 2023-06-06 RX ORDER — LIDOCAINE HYDROCHLORIDE 20 MG/ML
INJECTION, SOLUTION EPIDURAL; INFILTRATION; INTRACAUDAL; PERINEURAL AS NEEDED
Status: DISCONTINUED | OUTPATIENT
Start: 2023-06-06 | End: 2023-06-06

## 2023-06-06 RX ADMIN — LIDOCAINE HYDROCHLORIDE 100 MG: 20 INJECTION, SOLUTION EPIDURAL; INFILTRATION; INTRACAUDAL at 12:37

## 2023-06-06 RX ADMIN — ACETAMINOPHEN 975 MG: 325 TABLET ORAL at 11:33

## 2023-06-06 RX ADMIN — DOCUSATE SODIUM 100 MG: 100 CAPSULE ORAL at 16:15

## 2023-06-06 RX ADMIN — PHENAZOPYRIDINE 100 MG: 100 TABLET ORAL at 11:33

## 2023-06-06 RX ADMIN — SODIUM CHLORIDE, SODIUM LACTATE, POTASSIUM CHLORIDE, AND CALCIUM CHLORIDE 125 ML/HR: .6; .31; .03; .02 INJECTION, SOLUTION INTRAVENOUS at 11:54

## 2023-06-06 RX ADMIN — FENTANYL CITRATE 25 MCG: 50 INJECTION INTRAMUSCULAR; INTRAVENOUS at 14:09

## 2023-06-06 RX ADMIN — FENTANYL CITRATE 25 MCG: 50 INJECTION INTRAMUSCULAR; INTRAVENOUS at 13:02

## 2023-06-06 RX ADMIN — ACETAMINOPHEN 975 MG: 325 TABLET ORAL at 16:14

## 2023-06-06 RX ADMIN — PROPOFOL 130 MG: 10 INJECTION, EMULSION INTRAVENOUS at 12:37

## 2023-06-06 RX ADMIN — ONDANSETRON 4 MG: 2 INJECTION INTRAMUSCULAR; INTRAVENOUS at 12:37

## 2023-06-06 RX ADMIN — FENTANYL CITRATE 25 MCG: 50 INJECTION INTRAMUSCULAR; INTRAVENOUS at 13:37

## 2023-06-06 RX ADMIN — FENTANYL CITRATE 25 MCG: 50 INJECTION INTRAMUSCULAR; INTRAVENOUS at 13:00

## 2023-06-06 RX ADMIN — PROPOFOL 30 MG: 10 INJECTION, EMULSION INTRAVENOUS at 13:03

## 2023-06-06 RX ADMIN — CEFAZOLIN SODIUM 2000 MG: 2 SOLUTION INTRAVENOUS at 12:32

## 2023-06-06 RX ADMIN — HEPARIN SODIUM 5000 UNITS: 5000 INJECTION INTRAVENOUS; SUBCUTANEOUS at 11:54

## 2023-06-06 RX ADMIN — GABAPENTIN 400 MG: 400 CAPSULE ORAL at 11:34

## 2023-06-06 NOTE — ANESTHESIA POSTPROCEDURE EVALUATION
"Post-Op Assessment Note    CV Status:  Stable  Pain Score: 1    Pain management: adequate     Mental Status:  Alert and awake   Hydration Status:  Euvolemic   PONV Controlled:  Controlled   Airway Patency:  Patent      Post Op Vitals Reviewed: Yes      Staff: Anesthesiologist         No notable events documented      BP      Temp     Pulse     Resp      SpO2      /56   Pulse 58   Temp (!) 97 2 °F (36 2 °C) (Temporal)   Resp 15   Ht 5' 4\" (1 626 m)   Wt 86 6 kg (191 lb)   SpO2 94%   BMI 32 79 kg/m²     "

## 2023-06-06 NOTE — OP NOTE
OPERATIVE REPORT  PATIENT NAME: Tayo Nino    :  1935  MRN: [id number]  Pt Location: [facility] OR ROOM 04    SURGERY DATE: 2023    Surgeon(s) and Role:     * Sunni Coleman MD - Primary     * Ana Maria Holder MD - Resident Freddie Landeros MD - Fellow Assisting    Preop Diagnosis:  Incomplete uterovaginal prolapse [N81 2]  Stress incontinence (female) [N39 3]  Rectocele [N81 6]    Postop Diagnosis:  Incomplete uterovaginal prolapse [N81 2]  Stress incontinence (female) [N39 3]  Rectocele [N81 6]    Procedure(s):  1 - LeFort colpocleisis  2 - Pubovaginal sling (ALTIS)  3 - Posterior colporrhaphy  4 - Cystoscopy    Specimen(s):  * No specimens in log *    Estimated Blood Loss:   40 mL    Drains:  Urethral Catheter 18 Fr  (Active)   Number of days: 0       Anesthesia Type:   General/LMA    Operative Findings:  Unable to visualize cervix or cervical os secondary to vaginal atrophy    Cystoscopy at the end of the procedure showed normal appearing urothelium with no suture, mesh, or other foreign body  Bilateral ureteral jets were seen  Complications:   None    Procedure and Technique:  The patient was taken to the operating room where general LMA anesthesia was administered and found to be adequate  She was positioned in dorsal lithotomy using [place] stirrups  SCDs were placed on her legs for DVT prophylaxis and antibiotics were administered for surgical site infection prophylaxis  The vagina and perineum were prepped and the patient was draped in the normal sterile fashion  Timeout was called, identifying the correct patient and procedure  An exam under anesthesia was performed  Due to significant atrophy of the vaginal apex we were unable to locate or visualize the cervix  Therefore a D&C was not performed  We then began the LeFort colpocleisis  A stapleton catheter was inserted into the bladder   A rectangular piece of vaginal epithelium was sharply dissected from the anterior and posterior vagina  Epithelial channels were created laterally for drainage  Serial imbricating sutures of 0-vicryl were placed through the anterior and posterior endopelvic fascia to reduce the vagina inward  The vaginal epithelium was closed using interrupted sutures  Excellent hemostasis was noted  Next, attention was turned to the single incision pubovaginal sling (using the ALTIS system)  Hydrodissection of the vaginal wall beneath the mid urethra and vaginal sulci was performed  A 2 cm midurethral incision was made and periurethral tunnels were created towards the obturator membranes at 2 and 10 o'clock  The sling trocar was inserted into the fixation stay of the mesh and the static end of the mesh was placed into the left periurethral tunnel with the bladder protected and the tip of the trocar against the  complex  The trocar was first pushed cephalad, then advanced laterally until a pop was appreciated, then the trocar was rotated 1/4 turn, then withdrawn, leaving the stay in place  This same procedure was repeated on the patient's right side with the adjustable end of the sling  Cystoscopy was performed  Brisk efflux was noted from both ureters  The urothelium appeared normal with no lesions, suture, sling, or other foreign body  Using the Crede maneuver the sling was then tightened until minimal urine was seen leaking from the urethra  The adjustment suture was cut  The vaginal incision was closed with 2-0 Vicryl suture  The Alvarez catheter was reinserted  Through a separate incision, a she-shaped piece of perineal and vaginal epithelium was excised  A large perineorrhaphy and posterior colporrhaphy was performed using 0-PDS for the levator myorrhaphy and 0-vicryl to close epithelium to reduce the size of the genital hiatus  At the end of the procedure all counts were correct x2 and the patient was aroused from her anesthesia    Excellent hemostasis was noted     I spoke to the patient's family at the end of the procedure  I was present for the entire procedure  A qualified resident was not available      Patient Disposition:  PACU     SIGNATURE: Ronal Nguyễn MD  DATE: June 6, 2023  TIME: 2:34 PM

## 2023-06-06 NOTE — ANESTHESIA PREPROCEDURE EVALUATION
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)      /RENAL   (+) Benign hypertension with CKD (chronic kidney disease) stage III (HCC)      NEURO/PSYCH   (+) Anxiety   (+) Diabetic peripheral neuropathy associated with type 2 diabetes mellitus (HCC)   (+) Mild dementia (HCC)      Other   (+) Glaucoma        Physical Exam    Airway    Mallampati score: II  TM Distance: >3 FB  Neck ROM: full     Dental   lower dentures and upper dentures,     Cardiovascular  Rhythm: irregular, Cardiovascular exam normal    Pulmonary  Pulmonary exam normal Breath sounds clear to auscultation,     Other Findings        Anesthesia Plan  ASA Score- 3     Anesthesia Type- general with ASA Monitors  Additional Monitors:   Airway Plan:     Comment: 13 page cardiology evaluation on chart  Coumadin held X 5 days  Repeat INR = 1 4, Dr Elle Elder notified and approved to RN VIA tiger text preop          Plan Factors-    Chart reviewed  EKG reviewed  Existing labs reviewed  Patient summary reviewed  Patient is not a current smoker  Patient not instructed to abstain from smoking on day of procedure  Patient did not smoke on day of surgery  Induction- intravenous  Postoperative Plan-     Informed Consent- Anesthetic plan and risks discussed with patient and spouse

## 2023-06-06 NOTE — DISCHARGE INSTRUCTIONS
Colpocleisis   WHAT YOU NEED TO KNOW:   Colpocleisis is surgery to partially or completely close the vagina of an older woman with genital prolapse  It is done if you are too weak for other surgeries and are not sexually active  After surgery, you may have some pain and bleeding  The pain and bleeding should get better within 1 week  DISCHARGE INSTRUCTIONS:   Call 911 if: You feel lightheaded, short of breath, and have chest pain  You cough up blood  Seek care immediately if:   Your leg feels warm, tender, and painful  It may look swollen and red  You have heavy bleeding  Contact your healthcare provider if:   You have a foul smelling discharge  You have a fever, cough, or chills  You have pain that does not get better after you take medicine  You have abdominal pain  You have questions or concerns about your condition or care  Medicine: You may be given any of the following:  Antibiotics help prevent a bacterial infection  Take your medicine as directed  Contact your healthcare provider if you think your medicine is not helping or if you have side effects  Tell him or her if you are allergic to any medicine  Keep a list of the medicines, vitamins, and herbs you take  Include the amounts, and when and why you take them  Bring the list or the pill bottles to follow-up visits  Carry your medicine list with you in case of an emergency  Incision care: Wear a sanitary pad to monitor bleeding  Do not take a bath or get in a pool or hot tub until your healthcare provider says it is okay  He or she may give you information on how to take a sitz bath  Change your sanitary pad regularly  Keep track of how often you change the pad  Self Care:   Do not drive until your healthcare provider says it is okay  Prevent blood clots  Get up and move around your house several times a day  Avoid activities that may put pressure on your surgery area   Do not lift anything more than 5 pounds  Do not push or pull objects  Drink liquids and eat foods with fiber  Liquids and fiber will help you not strain to have bowel movements  Ask your healthcare provider which liquids are best for you  Ask him or her how much you should drink every day  Ask if you should take a fiber supplement  Foods with fiber include fruits, bran, and whole-grain breads  Follow up with your surgeon as directed: Write down your questions so you remember to ask them during your visits  © Copyright [address] Information is for End User's use only and may not be sold, redistributed or otherwise used for commercial purposes  All illustrations and images included in CareNotes® are the copyrighted property of [employer]  or [address] [place] [address]  The above information is an  only  It is not intended as medical advice for individual conditions or treatments  Talk to your doctor, nurse or pharmacist before following any medical regimen to see if it is safe and effective for you

## 2023-07-31 ENCOUNTER — TELEPHONE (OUTPATIENT)
Age: 88
End: 2023-07-31

## 2023-07-31 NOTE — TELEPHONE ENCOUNTER
Called Pt's , Henok Carrillo, and relayed Ping Pollard' message. Expressed she has an appointment with Pign Pollard on 8/15 where a PT order may be entered. Emphasized if weakness increases to see her primary care provider to get PT sooner. Otherwise Shefali Borjas will see her at her appt. Henok Gerardo expressed understanding.

## 2023-07-31 NOTE — TELEPHONE ENCOUNTER
Patient's spouse, Lilly Peterson ([phone]) called and would like to know if patient can have more PT. I advised Paulo Perez that I will contact provider and someone will get back to him.

## 2023-07-31 NOTE — TELEPHONE ENCOUNTER
I haven't see Mrs. Orourke in a while. We have appt on 8/15. I will put referral in then (I need to see her first). If she is having increased weakness  that he feels needs to be evaluated by a provider before that appt, he should check in with pcp so that she can be checked on and PT can get started sooner.   TY

## 2023-08-01 ENCOUNTER — TELEPHONE (OUTPATIENT)
Age: 88
End: 2023-08-01

## 2023-08-01 NOTE — TELEPHONE ENCOUNTER
Left message on daughter's mobile voicemail stating she should "take her mother to the emergency department today". Asked daughter to return this call to confirm she got the message, and with any questions she may have. This MR will continue to reach out to the patient's daughter until this office has confirmation that the message was received.

## 2023-08-01 NOTE — TELEPHONE ENCOUNTER
Patient's daughter, Harpreet Soliman ([phone]) called to say patient made threats to commit suicide on Sunday, 7/30/23. Spouse told daughter, that she has made similar comments in the recent past.    Patient is scheduled for a follow up visit on 8/15/23. Would the provider like to see the patient sooner? If future threats are made, how should the family respond?

## 2023-08-01 NOTE — TELEPHONE ENCOUNTER
Patient's daughter. Griselda, returned call. Daughter is concerned that patient's spouse would not be agreeable to taking patient to the hospital, and patient would be resistant as well. Daughter, Ziggy jp, expressed understanding of provider's recommendation and will do her very best to get the patient to the emergency room today. Daughter will contact this office with an update.

## 2023-08-01 NOTE — TELEPHONE ENCOUNTER
Can you let her daughter know that I think she needs an eval at ED - if she is making threats she might act on it at any time. Maybe there is something going on (like pain or other treatable issue), but I wouldn't wait on suicidal ideation comments at all. If she has any questions for me, I can call her on lunch break, but I recommend ED today.   TY

## 2023-08-02 NOTE — TELEPHONE ENCOUNTER
Patient's spouse, Dawson Payton, would not allow the daughter to take the patient to the emergency department. Spouse states patient will not be left alone between now and when she is seen by the doctor on 8/15/23.

## 2023-08-15 ENCOUNTER — OFFICE VISIT (OUTPATIENT)
Age: 88
End: 2023-08-15
Payer: MEDICARE

## 2023-08-15 VITALS
BODY MASS INDEX: 32.58 KG/M2 | HEART RATE: 62 BPM | SYSTOLIC BLOOD PRESSURE: 118 MMHG | WEIGHT: 190.8 LBS | DIASTOLIC BLOOD PRESSURE: 62 MMHG | TEMPERATURE: 97.4 F | OXYGEN SATURATION: 98 % | HEIGHT: 64 IN

## 2023-08-15 DIAGNOSIS — F03.A0 MILD DEMENTIA WITHOUT BEHAVIORAL DISTURBANCE, PSYCHOTIC DISTURBANCE, MOOD DISTURBANCE, OR ANXIETY, UNSPECIFIED DEMENTIA TYPE (HCC): Primary | ICD-10-CM

## 2023-08-15 DIAGNOSIS — R26.81 GAIT INSTABILITY: ICD-10-CM

## 2023-08-15 DIAGNOSIS — F33.40 RECURRENT MAJOR DEPRESSIVE DISORDER, IN REMISSION (HCC): ICD-10-CM

## 2023-08-15 DIAGNOSIS — F41.9 ANXIETY: ICD-10-CM

## 2023-08-15 DIAGNOSIS — N18.30 BENIGN HYPERTENSION WITH CKD (CHRONIC KIDNEY DISEASE) STAGE III (HCC): ICD-10-CM

## 2023-08-15 DIAGNOSIS — R47.01 EXPRESSIVE APHASIA: ICD-10-CM

## 2023-08-15 DIAGNOSIS — I12.9 BENIGN HYPERTENSION WITH CKD (CHRONIC KIDNEY DISEASE) STAGE III (HCC): ICD-10-CM

## 2023-08-15 PROCEDURE — 99214 OFFICE O/P EST MOD 30 MIN: CPT | Performed by: NURSE PRACTITIONER

## 2023-08-15 NOTE — PROGRESS NOTES
Assessment & Plan:   1. Mild dementia without behavioral disturbance, psychotic disturbance, mood disturbance, or anxiety, unspecified dementia type (HCC)  Assessment & Plan:  · MOCA 15/30- deficits in all domains except naming (5/6 orientation, 2/5 recall)  Pt's current cognitive level is consistent with mild dementia  Pt is independent w adl/dependent wiadls. Lives with , family supportive  Mobility:  Use of AD, has life alert  Continue to stay active. Current activity level:  fair. Participates in some social, cognitive, physical activity. Monitor for changes in mood, sleep, pain control. Notify provider if any concerns  Medication review:  Seems appropriate for current conditions. Pt/ have previously been counseled on potential SE of benzo. Check with pharmacist/provider before starting any new OTC/prescription medications for potential cognitive side effects  Optimize all acute and chronic conditions. Continue to follow-up with PCP and specialist as directed for management  Safety concerns:  None at this time  Caregiver stress:  None at this time  Ensure adequate hydration, good nutrition  Goal: to keep at home, as long as possible.  is not ready for HHA, but dgt would like him to consider      2. Benign hypertension with CKD (chronic kidney disease) stage III [facility])  Assessment & Plan:  Lab Results   Component Value Date    EGFR 38 04/28/2023    CREATININE 1.25 04/28/2023   · BP mildly elevated but is anxious about visit. · Continue to monitor and follow-up with PCP  · She remains on amlodipine and quinapril  · Continue dietary and lifestyle interventions  · Keep hydrated and avoid NSAIDs/nephrotoxins, renally dose medications      3. Anxiety  Assessment & Plan:  · Mood stable at present, denies anxiety  · Not on any SSRI, declines talk therapy  · Continue relaxation techniques, emotional support      4.  Recurrent major depressive disorder, in remission [facility])  Assessment & Plan:  · Patient denies any sadness or depression at this time  · GDS 2 (screens at level of no concern for depression)  · We discussed verbalizations to daughter, daughter explained this was during a frustrating time concerning in recliner, she has not verbalized any further thoughts. · Patient currently denies any plan or intent for SI/HI  · Patient and  declined talk therapy and SSRI  · Did discuss with daughter and  that they should monitor closely and consider behavioral health as needed  · They are both comfortable at this time the patient is safe      5. Gait instability  Assessment & Plan:  · Using walker, no recent falls. Has fear of falls  · Is sitting more and this is making her weaker. Family request PT eval and treat  · Referral to [facility] rehab for PT      6. Expressive aphasia  Assessment & Plan:  · Has had previous work-up, is chronic, did not have stroke per MRI  · Worked with ST in the past but speech impairment has gotten worse. Patient is finding a lot of frustration over communication  · We talked about developing a port the patient could point to to see if this helps  · I will refer back to ST since speech is worse, perhaps they have some thoughts on improved communication techniques    HPI:  We had the pleasure of evaluating Jak Barry who is a 80 y.o. female in Geriatric follow up today. Previous MOCA:  18/30. Comorbidities include dementia, htn, gait instability. She lives with her   Ms. Conchita Huerta is in the office with her  and daughter    Memory update per patient:  STM loss is ok. Able to do adls. OK with cooking. OK with cleaning.  does shopping. BOth do banking.  does driving. Appetite is ok, fluid intake is ok. At night, gets up multiple times to go to the bathroom, sometimes can fall asleep. No anxiety. No saddness. Denies SI/Hi.   We talked about episode that daughter called in about - pt state she was frustrated, said that she wanted to end things, but would never intentionally harm herself, has no plan, was just said because she was mad. Vision and hearing is good. Sitting more. Doesn't want to do PT. Thought process is clear, doesn't always [verbally] want to come out. Understands speech fine. Has chronic lower back pain. She was a . Cognitive:  Reading the paper, no cross words. Watches game shows. Physical:  Chores, no walking. Social:  Goes out a little bit. She has difficulty finding the right word while speaking: Yes  Patient requires repeat information or ask the same question repeatedly: No  Do you do your own bathing, dressing, feeding yourself Yes  Can you make your own meals and do light cleaning/chores Yes  Do you take care of your own medications No-  sets up and admins. She does keep an eye when  doesn't take his own meds. Do you drive: No       Do you handle your own financial affairs such as balancing your checkbook, paying bills, investments: No  Have you or your family noted any change in your mood or personality:No  Are you currently or have you been treated in the past for depression or anxiety: No  Have you noticed any gait or balance disorder: Yes  Uses :Walker: and cane, no recent falls  Any hallucination or delusion: No  Sleep Issues: Yes - gets up to BR multiple times a night  Urinary/Stool Incontinence: No  Hearing and vision issue: No glasses or HAs  ADL/IADL:  Independent/dependent  Appetite/swallow:  Good/good  Pain:  Lower back    Memory update per family:  Having more trouble with speech. Did have ST who couldn't go further. We talked about picture board today to help with communication. Daughter will try to set up. Difficult to understand on phone. Able to follow instruction. Gets frustrated when family doesn't understand. This has been ongoing and worked up, but worse. She is up and moving. Can dress self, do laundry.   Cooking is more difficult - forgets ingredients. Hasn't left stove on. When  there, she won't get up and do anything. He doesn't want anyone to help, dgt feel pt and  would benefit from help in home. .  Won't do wash when  home, but will do when he goes out. Does have fall alert. Dgts try to help. They are concerned when dad goes out. Pt does go out to dinner and club meetings. She is having trouble walking (afraid of falling)- using walker and cane. She hasn't had any recent falls. No safety concerns when  going out. She does get up for BR, then can go back to sleep. Tried to call family doc and couldn't communicate, daughter states she was her normal.  When she gets angry she will verbalize wanting to end things. Has never had any plan.  does not think she is depressed or in need of antidepressant. She wouldn't be able to utilize talk therapy d/t speech issues.  does go out a bit:  takes walk 3 times a week, gym, golf (once a week for 3 hours)  Family Review of Behavior [facility]:    pacing. No    agressive/combative behavior. No    agitated. Yes - with communications  wandering. No   resistance to care. No   hoarding/hiding objects. No    suspicious  No  withdrawn No  misplacing/losing objects No  personal hygiene problems. No  forgetfulness of actions Yes    ROS: Review of Systems   Constitutional: Negative for activity change, appetite change, chills and fatigue. HENT: Negative for congestion, hearing loss and trouble swallowing. Eyes: Negative for visual disturbance. Respiratory: Negative for cough and shortness of breath. Cardiovascular: Negative for chest pain. Gastrointestinal: Negative for abdominal pain, constipation, diarrhea, nausea and vomiting. Genitourinary: Negative for difficulty urinating. Musculoskeletal: Positive for back pain and gait problem (cane). Negative for arthralgias. Neurological: Negative for dizziness and light-headedness. Psychiatric/Behavioral: Positive for decreased concentration (forgetful) and sleep disturbance. Negative for dysphoric mood (denies HI/SI). The patient is not nervous/anxious. Past Medical, surgical, social, medication and allergy history and patients previous records reviewed. Allergies:    Allergies   Allergen Reactions   • Hydrocodone-Acetaminophen Abdominal Pain   • Erythromycin GI Intolerance     "stomach burning", bruises   • Minocycline GI Intolerance     "stomach burning", bruises   • Penicillins GI Intolerance     "burning stomach", bruising   • Propoxyphene GI Intolerance   • Tetracycline GI Intolerance     "stomach burning"-bruising   • Vicodin [Hydrocodone-Acetaminophen] GI Intolerance     "stomach burning" ,nausea   • Prednisone    • Avelox [Moxifloxacin] GI Intolerance     Avoids d/t N/V   • Percocet [Oxycodone-Acetaminophen] GI Intolerance     Avoids d/t N/V   • Sulfa Antibiotics Rash       Medications:      Current Outpatient Medications:   •  acetaminophen (TYLENOL) 650 mg CR tablet, Take 2 tablets (1,300 mg total) by mouth every 8 (eight) hours as needed for mild pain, Disp: 30 tablet, Rfl: 0  •  ALPRAZolam (XANAX) 0.5 mg tablet, Take 0.5 mg by mouth daily at bedtime as needed, Disp: , Rfl:   •  amLODIPine (NORVASC) 5 mg tablet, Take 5 mg by mouth daily, Disp: , Rfl:   •  brimonidine (ALPHAGAN P) 0.1 %, 1 drop in the morning Each eye, Disp: , Rfl:   •  Calcium Citrate (CITRACAL PO), Take by mouth every morning, Disp: , Rfl:   •  CLINDAMYCIN HCL PO, Take 600 mg by mouth as needed Prior to invasive procedures, Disp: , Rfl:   •  Cyanocobalamin (B-12) 5000 MCG CAPS, Take by mouth, Disp: , Rfl:   •  latanoprost (XALATAN) 0.005 % ophthalmic solution, Administer 1 drop to both eyes daily at bedtime  , Disp: , Rfl:   •  Multiple Vitamins-Minerals (PRESERVISION AREDS 2+MULTI VIT PO), Take 2 tablets by mouth daily  , Disp: , Rfl:   •  Polyethyl Glycol-Propyl Glycol (SYSTANE OP), Apply to eye 4 (four) times a day Each eye, Disp: , Rfl:   •  pravastatin (PRAVACHOL) 40 mg tablet, Take 40 mg by mouth daily at bedtime, Disp: , Rfl:   •  quinapril (ACCUPRIL) 10 mg tablet, Take 20 mg by mouth daily  , Disp: , Rfl:   •  warfarin (COUMADIN) 0.5 mg tablet, Take 0.5 mg by mouth daily, Disp: , Rfl:   •  polyethylene glycol (MIRALAX) 17 g packet, Take 17 g by mouth daily (Patient not taking: Reported on 8/15/2023), Disp: , Rfl: 0    Vitals:  Vitals:    08/15/23 1041   BP: 118/62   Pulse: 62   Temp: (!) 97.4 °F (36.3 °C)   SpO2: 98%       History:  Past Medical History:   Diagnosis Date   • Arthritis    • Atrial fibrillation (HCC)    • Borderline diabetes     watches diet   • Chronic kidney disease, stage 3, mod decreased GFR (HCC)    • Dementia ([address])     per pt /daughter   • Diabetes mellitus ([address])    • Dry eye syndrome, bilateral    • Full dentures    • Gastric ulcer     dx in 9/2017   • Glaucoma     had laser   • History of mammogram 2018   • Hyperlipidemia     controlled   • Hypertension     controlled   • Irregular heart beat 09/2017    "pre-mature" beat-saw cardiologist-12/17-Holter monitor 1/18   • Ovarian cyst 07/06/2018   • Papanicolaou smear for cervical cancer screening 06/05/2015    Neg    • Presence of intracervical pessary    • Wears glasses      Past Surgical History:   Procedure Laterality Date   • APPENDECTOMY      age 6   • CATARACT EXTRACTION Bilateral    • COLONOSCOPY  09/13/2017   • DILATION AND CURETTAGE OF UTERUS     • EGD  01/22/2018    Ulcers healed   • EGD AND COLONOSCOPY     • ESOPHAGOGASTRIC FUNDOPLASTY  09/13/2017   • JOINT REPLACEMENT Bilateral     knees   • SC COLPOCLEISIS LE FORT TYPE N/A 6/6/2023    Procedure: LE FORT COLPOCLEISIS;  Surgeon: Dee Beltran MD;  [facility];  Service: UroGynecology          • SC CYSTOURETHROSCOPY N/A 6/6/2023    Procedure: Heriberto Hokos;  Surgeon: Dee Beltran MD;  [facility];  Service: UroGynecology          • NJ POST COLPORRHAPHY RECTOCELE W/WO PERINEORRHAPHY N/A 6/6/2023    Procedure: POSTERIOR COLPORRHAPHY;  Surgeon: Vickie Urbina MD;  [facility];  Service: UroGynecology          • NJ SLING OPERATION STRESS INCONTINENCE N/A 6/6/2023    Procedure: PV SLING;  Surgeon: Vickie Urbina MD;  [facility];  Service: UroGynecology          • ROTATOR CUFF REPAIR Left     titanium screw   • TONSILLECTOMY       Family History   Problem Relation Age of Onset   • Cancer Mother         breast   • Melanoma Mother         Malignant, internal melanoma    • Heart disease Brother         CHF     Social History     Socioeconomic History   • Marital status: /Civil Union     Spouse name: Not on file   • Number of children: Not on file   • Years of education: Not on file   • Highest education level: Not on file   Occupational History   • Not on file   Tobacco Use   • Smoking status: Never   • Smokeless tobacco: Never   Vaping Use   • Vaping Use: Never used   Substance and Sexual Activity   • Alcohol use: Yes     Comment: occ   • Drug use: No   • Sexual activity: Not Currently     Partners: Male   Other Topics Concern   • Not on file   Social History Narrative    No alcohol intake - As per Sudha     Caffeine intake: None    Seat belts used routinely     Advance directive: No     Social Determinants of Health     Financial Resource Strain: Not on file   Food Insecurity: Not on file   Transportation Needs: Not on file   Physical Activity: Not on file   Stress: Not on file   Social Connections: Not on file   Intimate Partner Violence: Not on file   Housing Stability: Not on file     Past Surgical History:   Procedure Laterality Date   • APPENDECTOMY      age 6   • CATARACT EXTRACTION Bilateral    • COLONOSCOPY  09/13/2017   • DILATION AND CURETTAGE OF UTERUS     • EGD  01/22/2018    Ulcers healed   • EGD AND COLONOSCOPY     • ESOPHAGOGASTRIC FUNDOPLASTY  09/13/2017   • JOINT REPLACEMENT Bilateral knees   • IN COLPOCLEISIS LE FORT TYPE N/A 6/6/2023    Procedure: LE FORT COLPOCLEISIS;  Surgeon: Jeanette Tierney MD;  [facility];  Service: UroGynecology          • IN CYSTOURETHROSCOPY N/A 6/6/2023    Procedure: Covarrubias Ilya;  Surgeon: Jeanette Tierney MD;  [facility];  Service: UroGynecology          • IN POST COLPORRHAPHY RECTOCELE W/WO PERINEORRHAPHY N/A 6/6/2023    Procedure: POSTERIOR COLPORRHAPHY;  Surgeon: Jeanette Tierney MD;  [facility];  Service: UroGynecology          • IN SLING OPERATION STRESS INCONTINENCE N/A 6/6/2023    Procedure: PV SLING;  Surgeon: Jeanette Tiernye MD;  [facility];  Service: UroGynecology          • ROTATOR CUFF REPAIR Left     titanium screw   • TONSILLECTOMY           Physical Exam:  Observed ambulation:  Use of walker, slower, fairly steady   Physical Exam  Vitals and nursing note reviewed. Constitutional:       General: She is not in acute distress. Appearance: Normal appearance. She is well-developed. She is not diaphoretic. HENT:      Head: Normocephalic. Cardiovascular:      Rate and Rhythm: Normal rate. Heart sounds: No murmur heard. No friction rub. No gallop. Pulmonary:      Effort: Pulmonary effort is normal. No respiratory distress. Breath sounds: Normal breath sounds. No wheezing or rales. Abdominal:      General: Bowel sounds are normal. There is no distension. Palpations: Abdomen is soft. Tenderness: There is no abdominal tenderness. There is no rebound. Musculoskeletal:         General: Normal range of motion. Skin:     General: Skin is warm and dry. Neurological:      General: No focal deficit present. Mental Status: She is alert. Mental status is at baseline. Comments: Expressive aphasia.   Can get most words out when she slows down, does well with y/n questions, but even occasionally answered no for yes, then had to back track  Oriented to person, mostly tps  Pleasant, forgetful, cooperative   Psychiatric:         Mood and Affect: Mood normal.         Behavior: Behavior normal.

## 2023-08-15 NOTE — ASSESSMENT & PLAN NOTE
· Using walker, no recent falls. Has fear of falls  · Is sitting more and this is making her weaker.   Family request PT eval and treat  · Referral to [facility] rehab for PT

## 2023-08-15 NOTE — ASSESSMENT & PLAN NOTE
· Patient denies any sadness or depression at this time  · GDS 2 (screens at level of no concern for depression)  · We discussed verbalizations to daughter, daughter explained this was during a frustrating time concerning in recliner, she has not verbalized any further thoughts.   · Patient currently denies any plan or intent for SI/HI  · Patient and  declined talk therapy and SSRI  · Did discuss with daughter and  that they should monitor closely and consider behavioral health as needed  · They are both comfortable at this time the patient is safe

## 2023-08-15 NOTE — ASSESSMENT & PLAN NOTE
· Mood stable at present, denies anxiety  · Not on any SSRI, declines talk therapy  · Continue relaxation techniques, emotional support

## 2023-08-15 NOTE — PROGRESS NOTES
[facility]  [address] [facility], [facility], [address] [place] [address] [facility]  [phone]    Social Work Follow-Up    LSW met with Roico Miranda for follow up visit. Completed Des Cognitive Assessment, score 15/30 (previously 18/30 in 2/10/23), and Geriatric Depression Screen, 2/15 (previously 1/15 in 2/10/23). [place] Cognitive Assessment (MoCA) Version 8.1  Education: HS    Points Earned POSSIBLE Points   Visuospatial/Executive   Alternating [place]vert Making 0 1   Visuoconstructional skills 0 1   Visuoconstructional skills (clock) 1 3   Naming   Naming Animals 3 3   Attention   Digit Span 1 2   Vigilance (letters) 1 1   Serial 7 subtraction 1 3   Language   Sentence Repetition 0 2   Verbal fluency 0 1   Abstraction   Abstraction (word pairings) 0 2   Delayed recall   Delayed recall 2 5   Memory index score: 10/15   Orientation   Orientation 5 6   TOTAL SCORE: 15/30  (Normal ?26/30)   Additional notes:      LSW also met with pt's daughter Kimberley Vasquez and pt's  Rachael Stanton.  LSW provided the following resources in office:     General Information  - 10 Ways to Love Your Brain  - Memory loss ladder  - Packet with Alzheimer's Association information including: definition of dementia, communication tips for different stages, common behaviors and response strategies    Caregiver Support  - Flyer for [facility] Virtual Caregiver Support Group (meeting 2x per month by [employer])  - Alzheimer's Association Rx Pad (guide to website and 24/7 helpline, 3-685.701.3814)    Safety  - [facility] fall prevention / home safety [address] [place] [address] in [address] [place] [address] contains information on home care agencies, adult day centers, higher levels of care, and more  - [employer] List    Other  - PPA booklet  - Alzheimer's Era Caregiver Tip Sheets: anxiety, communication, anger/frustration/fighting  - [facility]: Caregiver Website QR code to scan for resources/education    LSW to remain available as needed.

## 2023-08-15 NOTE — ASSESSMENT & PLAN NOTE
Lab Results   Component Value Date    EGFR 38 04/28/2023    CREATININE 1.25 04/28/2023   · BP mildly elevated but is anxious about visit.     · Continue to monitor and follow-up with PCP  · She remains on amlodipine and quinapril  · Continue dietary and lifestyle interventions  · Keep hydrated and avoid NSAIDs/nephrotoxins, renally dose medications

## 2023-08-15 NOTE — ASSESSMENT & PLAN NOTE
· Has had previous work-up, is chronic, did not have stroke per MRI  · Worked with ST in the past but speech impairment has gotten worse.   Patient is finding a lot of frustration over communication  · We talked about developing a port the patient could point to to see if this helps  · I will refer back to ST since speech is worse, perhaps they have some thoughts on improved communication techniques

## 2023-08-15 NOTE — ASSESSMENT & PLAN NOTE
· MOCA 15/30- deficits in all domains except naming (5/6 orientation, 2/5 recall)  Pt's current cognitive level is consistent with mild dementia  Pt is independent w adl/dependent wiadls. Lives with , family supportive  Mobility:  Use of AD, has life alert  Continue to stay active. Current activity level:  fair. Participates in some social, cognitive, physical activity. Monitor for changes in mood, sleep, pain control. Notify provider if any concerns  Medication review:  Seems appropriate for current conditions. Pt/ have previously been counseled on potential SE of benzo. Check with pharmacist/provider before starting any new OTC/prescription medications for potential cognitive side effects  Optimize all acute and chronic conditions. Continue to follow-up with PCP and specialist as directed for management  Safety concerns:  None at this time  Caregiver stress:  None at this time  Ensure adequate hydration, good nutrition  Goal: to keep at home, as long as possible.    is not ready for HHA, but dgt would like him to consider

## 2023-10-07 ENCOUNTER — VBI (OUTPATIENT)
Dept: ADMINISTRATIVE | Facility: OTHER | Age: 88
End: 2023-10-07

## 2023-11-26 ENCOUNTER — VBI (OUTPATIENT)
Dept: ADMINISTRATIVE | Facility: OTHER | Age: 88
End: 2023-11-26

## 2024-02-21 PROBLEM — N39.0 URINARY TRACT INFECTION WITHOUT HEMATURIA: Status: RESOLVED | Noted: 2018-06-06 | Resolved: 2024-02-21

## 2024-03-05 ENCOUNTER — OFFICE VISIT (OUTPATIENT)
Age: 89
End: 2024-03-05
Payer: MEDICARE

## 2024-03-05 VITALS
HEART RATE: 69 BPM | SYSTOLIC BLOOD PRESSURE: 120 MMHG | WEIGHT: 193.2 LBS | TEMPERATURE: 98.2 F | HEIGHT: 64 IN | BODY MASS INDEX: 32.98 KG/M2 | OXYGEN SATURATION: 99 % | DIASTOLIC BLOOD PRESSURE: 72 MMHG

## 2024-03-05 DIAGNOSIS — R47.01 EXPRESSIVE APHASIA: ICD-10-CM

## 2024-03-05 DIAGNOSIS — R26.81 GAIT INSTABILITY: ICD-10-CM

## 2024-03-05 DIAGNOSIS — F03.A0 MILD DEMENTIA WITHOUT BEHAVIORAL DISTURBANCE, PSYCHOTIC DISTURBANCE, MOOD DISTURBANCE, OR ANXIETY, UNSPECIFIED DEMENTIA TYPE (HCC): Primary | ICD-10-CM

## 2024-03-05 DIAGNOSIS — F41.9 ANXIETY: ICD-10-CM

## 2024-03-05 PROCEDURE — G2211 COMPLEX E/M VISIT ADD ON: HCPCS | Performed by: NURSE PRACTITIONER

## 2024-03-05 PROCEDURE — 99214 OFFICE O/P EST MOD 30 MIN: CPT | Performed by: NURSE PRACTITIONER

## 2024-03-05 RX ORDER — CALCIUM CITRATE/VITAMIN D3 200MG-6.25
TABLET ORAL
COMMUNITY

## 2024-03-05 NOTE — PROGRESS NOTES
Assessment & Plan:   1. Mild dementia without behavioral disturbance, psychotic disturbance, mood disturbance, or anxiety, unspecified dementia type (HCC)  Assessment & Plan:  MOCA 10/30- deficits in all domains except naming (3/6 orientation, 0/5 recall), MME 3/3 ID, 3/3 recall with hint  Pt's current cognitive level is consistent with mild dementia  Pt is independent w adl/dependent w iadls.  Lives with , family supportive  Mobility:  Use of AD(walker and cane), has life alert  Continue to stay active.  Current activity level:  fair.  Participates in some social, cognitive, physical activity.  Monitor for changes in mood, sleep, pain control.  Notify provider with any concerns.  Medication review:  Seems appropriate for current conditions.  Pt/ have previously been counseled on potential SE of benzo.  Check with pharmacist/provider before starting any new OTC/prescription medications for potential cognitive side effects  Optimize all acute and chronic conditions.  Continue to follow-up with PCP and specialist as directed for management  Safety concerns:  None at this time  Caregiver stress:  Communication with pt more difficult, does not feel pt is able to adequately express her needs due to aphasia  Ensure adequate hydration, good nutrition  Goal: to keep at home, as long as possible.   still does not feel need for HHA yet, but dgt would like him to consider.       2. Expressive aphasia  Assessment & Plan:  Has had previous work-up, is chronic, did not have stroke per MRI  Worked with [facility] in the past but speech impairment has gotten worse.  Pt finding a lot of frustration over communication  Will refer back to ST since speech is worse, perhaps they have some thoughts on improved communication techniques  Discussed printing a communication chart from online in the interim that would allow pt to point to pictures identifying what she is trying to communicate       3. Gait  instability  Assessment & Plan:  Using walker and cane,  no recent falls.  Has fear of falls  Continue with at home exercises as tolerated.      4. Anxiety  Assessment & Plan:  Mood stable at present, denies anxiety  Not on any SSRI, declines talk therapy  If able, try to limit use of xanax for sleep - this medication can inc cog decline over time, can inc fall risk.  Continue relaxation techniques, emotional support      HPI:  We had the pleasure of evaluating Joyce Orourke who is a 88 y.o. female   in Geriatric follow up today.  Previous MOCA:  15/30.  Comorbidities include mild dementia, HTN, CKD3, DM, gait instability.   She lives at home with her .  Ms. Orourke is in the office with her  Lul and her daughter.     Memory update per patient:  Having more difficulty with speech. Memory is out the same.   Current activities:  Cognitive:   Physical: off and on home exercises  Social: goes out to Sikh with , goes out to dinner, visits with family.   She has difficulty finding the right word while speaking: Yes  Patient requires repeat information or ask the same question repeatedly: No  Do you do your own bathing, dressing, feeding yourself Yes  Can you make your own meals and do light cleaning/chores Yes  Do you take care of your own medications No  Do you drive: No         Do you handle your own financial affairs such as balancing your checkbook, paying bills, investments: No- manages  Have you or your family noted any change in your mood or personality:Yes  Are you currently or have you been treated in the past for depression or anxiety: Yes  Have you noticed any gait or balance disorder: Yes  Uses : walker and cane  Any hallucination or delusion: No  Fluctuation in alertness: No  Sleep Issues: Yes- due to incont, takes xanax for sleep, says it doesn't work  Urinary/Stool Incontinence: Yes-urine   Hearing and vision issue: No  ADL/IADL:   independent/supported  Appetite/swallow:  no issues  Pain:  back pain- sees pain management, will be getting a block done next week    Memory update per family: She's getting a little worse (memory and speech). ADLs independent, does the wash occasionally, doing a little cooking able to use stove and microwave. No problems operating knobs/buttons. Increased frustration with her speech, getting yes's and no's mixed up, difficulty being able to express what she needs/wants. More withdrawn, doesn't want to be around people due to speech difficulties. Getting out to Hinduism, dinners, the grocery store. Able to recite prayers, rosary. Having more difficulties with writing.  Family Review of Behavior [facility]:    pacing. No    agressive/combative behavior. No    agitated. No   wandering. No   resistance to care. No   hoarding/hiding objects.No    suspicious  No  withdrawn No  rummaging/pillaging.No    misplacing/losing objects No  personal hygiene problems.No  forgetfulness of actions No    ROS: Review of Systems   Constitutional:  Negative for appetite change, chills, fever and unexpected weight change.   HENT:  Negative for congestion, ear pain, hearing loss, sinus pain, sneezing, sore throat and trouble swallowing.    Eyes:  Negative for pain.   Respiratory:  Negative for cough and shortness of breath.    Cardiovascular:  Positive for leg swelling. Negative for chest pain.   Gastrointestinal:  Negative for abdominal distention, abdominal pain, diarrhea, nausea and vomiting.   Genitourinary:  Negative for difficulty urinating, dyspareunia and dysuria.        Incontinent   Musculoskeletal:  Positive for back pain and gait problem. Negative for myalgias.   Neurological:  Positive for speech difficulty. Negative for dizziness, tremors, weakness, light-headedness and headaches.   Psychiatric/Behavioral:  Positive for sleep disturbance (2/2 incontinence). Negative for agitation and decreased concentration.        Past  "Medical, surgical, social, medication and allergy history and patients previous records reviewed.  Allergies:   Allergies   Allergen Reactions    Hydrocodone-Acetaminophen Abdominal Pain    Erythromycin GI Intolerance     \"stomach burning\", bruises    Minocycline GI Intolerance     \"stomach burning\", bruises    Penicillins GI Intolerance     \"burning stomach\", bruising    Propoxyphene GI Intolerance    Tetracycline GI Intolerance     \"stomach burning\"-bruising    Vicodin [Hydrocodone-Acetaminophen] GI Intolerance     \"stomach burning\" ,nausea    Prednisone     Avelox [Moxifloxacin] GI Intolerance     Avoids d/t N/V    Percocet [Oxycodone-Acetaminophen] GI Intolerance     Avoids d/t N/V    Sulfa Antibiotics Rash       Medications:      Current Outpatient Medications:     acetaminophen (TYLENOL) 650 mg CR tablet, Take 2 tablets (1,300 mg total) by mouth every 8 (eight) hours as needed for mild pain, Disp: 30 tablet, Rfl: 0    ALPRAZolam (XANAX) 0.5 mg tablet, Take 0.5 mg by mouth daily at bedtime as needed, Disp: , Rfl:     amLODIPine (NORVASC) 5 mg tablet, Take 5 mg by mouth daily, Disp: , Rfl:     brimonidine (ALPHAGAN P) 0.1 %, 1 drop in the morning Each eye, Disp: , Rfl:     Calcium Citrate (CITRACAL PO), Take by mouth every morning, Disp: , Rfl:     CLINDAMYCIN HCL PO, Take 600 mg by mouth as needed Prior to invasive procedures, Disp: , Rfl:     Cyanocobalamin (B-12) 5000 MCG CAPS, Take by mouth, Disp: , Rfl:     latanoprost (XALATAN) 0.005 % ophthalmic solution, Administer 1 drop to both eyes daily at bedtime  , Disp: , Rfl:     Multiple Minerals-Vitamins (Citracal Plus) TABS, , Disp: , Rfl:     Multiple Vitamins-Minerals (PRESERVISION AREDS 2+MULTI VIT PO), Take 2 tablets by mouth daily  , Disp: , Rfl:     Polyethyl Glycol-Propyl Glycol (SYSTANE OP), Apply to eye 4 (four) times a day Each eye, Disp: , Rfl:     pravastatin (PRAVACHOL) 40 mg tablet, Take 40 mg by mouth daily at bedtime, Disp: , Rfl:     quinapril " "(ACCUPRIL) 10 mg tablet, Take 20 mg by mouth daily  , Disp: , Rfl:     warfarin (COUMADIN) 0.5 mg tablet, Take 0.5 mg by mouth daily, Disp: , Rfl:     polyethylene glycol (MIRALAX) 17 g packet, Take 17 g by mouth daily (Patient not taking: Reported on 8/15/2023), Disp: , Rfl: 0    Vitals:  Vitals:    03/05/24 0818   BP: 120/72   Pulse: 69   Temp: 98.2 °F (36.8 °C)   SpO2: 99%       History:  Past Medical History:   Diagnosis Date    Arthritis     Atrial fibrillation (HCC)     Borderline diabetes     watches diet    Chronic kidney disease, stage 3, mod decreased GFR (HCC)     Dementia (HCC)     per pt /daughter    Diabetes mellitus (HCC)     Dry eye syndrome, bilateral     Full dentures     Gastric ulcer     dx in 9/2017    Glaucoma     had laser    History of mammogram 2018    Hyperlipidemia     controlled    Hypertension     controlled    Irregular heart beat 09/2017    \"pre-mature\" beat-saw cardiologist-12/17-Holter monitor 1/18    Ovarian cyst 07/06/2018    Papanicolaou smear for cervical cancer screening 06/05/2015    Neg     Presence of intracervical pessary     Wears glasses      Past Surgical History:   Procedure Laterality Date    APPENDECTOMY      age 8    CATARACT EXTRACTION Bilateral     COLONOSCOPY  09/13/2017    DILATION AND CURETTAGE OF UTERUS      EGD  01/22/2018    Ulcers healed    EGD AND COLONOSCOPY      ESOPHAGOGASTRIC FUNDOPLASTY  09/13/2017    JOINT REPLACEMENT Bilateral     knees    MS COLPOCLEISIS LE FORT TYPE N/A 6/6/2023    Procedure: LE FORT COLPOCLEISIS;  Surgeon: Cristi Madden MD;  [facility];  Service: UroGynecology           MS CYSTOURETHROSCOPY N/A 6/6/2023    Procedure: CYSTO;  Surgeon: Cristi Madden MD;  [facility];  Service: UroGynecology           MS POST COLPORRHAPHY RECTOCELE W/WO PERINEORRHAPHY N/A 6/6/2023    Procedure: POSTERIOR COLPORRHAPHY;  Surgeon: Cristi Madden MD;  [facility];  Service: " UroGynecology           AR SLING OPERATION STRESS INCONTINENCE N/A 6/6/2023    Procedure: PV SLING;  Surgeon: Cristi Madden MD;  [facility];  Service: UroGynecology           ROTATOR CUFF REPAIR Left     titanium screw    TONSILLECTOMY       Family History   Problem Relation Age of Onset    Cancer Mother         breast    Melanoma Mother         Malignant, internal melanoma     Heart disease Brother         CHF     Social History     Socioeconomic History    Marital status: /Civil Union     Spouse name: Not on file    Number of children: Not on file    Years of education: Not on file    Highest education level: Not on file   Occupational History    Not on file   Tobacco Use    Smoking status: Never    Smokeless tobacco: Never   Vaping Use    Vaping status: Never Used   Substance and Sexual Activity    Alcohol use: Yes     Comment: occ    Drug use: No    Sexual activity: Not Currently     Partners: Male   Other Topics Concern    Not on file   Social History Narrative    No alcohol intake - As per Sudha     Caffeine intake: None    Seat belts used routinely     Advance directive: No     Social Determinants of Health     Financial Resource Strain: Not on file   Food Insecurity: Not on file   Transportation Needs: Not on file   Physical Activity: Not on file   Stress: Not on file   Social Connections: Socially Integrated (5/25/2023)    Received from [facility]    Social Connection and Isolation Panel [NHANES]     Frequency of Communication with Friends and Family: More than three times a week     Frequency of Social Gatherings with Friends and Family: Twice a week     Attends Taoism Services: More than 4 times per year     Active Member of Clubs or Organizations: Yes     Attends Club or Organization Meetings: More than 4 times per year     Marital Status:    Intimate Partner Violence: Not At Risk (5/25/2023)    Received from [facility]     Humiliation, Afraid, Rape, and Kick questionnaire     Fear of Current or Ex-Partner: No     Emotionally Abused: No     Physically Abused: No     Sexually Abused: No   Housing Stability: Not on file     Past Surgical History:   Procedure Laterality Date    APPENDECTOMY      age 8    CATARACT EXTRACTION Bilateral     COLONOSCOPY  09/13/2017    DILATION AND CURETTAGE OF UTERUS      EGD  01/22/2018    Ulcers healed    EGD AND COLONOSCOPY      ESOPHAGOGASTRIC FUNDOPLASTY  09/13/2017    JOINT REPLACEMENT Bilateral     knees    ND COLPOCLEISIS LE FORT TYPE N/A 6/6/2023    Procedure: LE FORT COLPOCLEISIS;  Surgeon: Cristi Madden MD;  [facility];  Service: UroGynecology           ND CYSTOURETHROSCOPY N/A 6/6/2023    Procedure: CYSTO;  Surgeon: Cristi Madden MD;  [facility];  Service: UroGynecology           ND POST COLPORRHAPHY RECTOCELE W/WO PERINEORRHAPHY N/A 6/6/2023    Procedure: POSTERIOR COLPORRHAPHY;  Surgeon: Cristi Madden MD;  [facility];  Service: UroGynecology           ND SLING OPERATION STRESS INCONTINENCE N/A 6/6/2023    Procedure: PV SLING;  Surgeon: Cristi Madden MD;  [facility];  Service: UroGynecology           ROTATOR CUFF REPAIR Left     titanium screw    TONSILLECTOMY           Physical Exam:  Observed ambulation:  slow, careful, steady with rolling walker   Physical Exam  Vitals reviewed.   Constitutional:       General: She is not in acute distress.     Appearance: Normal appearance. She is well-developed. She is not diaphoretic.   HENT:      Head: Normocephalic.   Eyes:      Comments: Wears glasses   Cardiovascular:      Rate and Rhythm: Normal rate. Rhythm irregular.      Heart sounds: Normal heart sounds. No murmur heard.     No friction rub. No gallop.   Pulmonary:      Effort: Pulmonary effort is normal. No respiratory distress.      Breath sounds: Normal breath sounds. No wheezing or rales.   Abdominal:       General: Bowel sounds are normal. There is no distension.      Palpations: Abdomen is soft.      Tenderness: There is no abdominal tenderness. There is no rebound.   Musculoskeletal:         General: Swelling (+2 LLE, non pitting R) present. Normal range of motion.   Skin:     General: Skin is warm and dry.      Coloration: Skin is not jaundiced.      Findings: No erythema.   Neurological:      General: No focal deficit present.      Mental Status: She is alert and oriented to person, place, and time.      Cranial Nerves: No cranial nerve deficit.      Motor: Weakness (BLLE) present.   Psychiatric:         Mood and Affect: Mood normal.         Behavior: Behavior normal.

## 2024-03-05 NOTE — ASSESSMENT & PLAN NOTE
Using walker and cane,  no recent falls.  Has fear of falls  Continue with at home exercises as tolerated.

## 2024-03-05 NOTE — ASSESSMENT & PLAN NOTE
Has had previous work-up, is chronic, did not have stroke per MRI  Worked with [facility] in the past but speech impairment has gotten worse.  Pt finding a lot of frustration over communication  Will refer back to ST since speech is worse, perhaps they have some thoughts on improved communication techniques  Discussed printing a communication chart from online in the interim that would allow pt to point to pictures identifying what she is trying to communicate

## 2024-03-05 NOTE — ASSESSMENT & PLAN NOTE
Mood stable at present, denies anxiety  Not on any SSRI, declines talk therapy  If able, try to limit use of xanax for sleep - this medication can inc cog decline over time, can inc fall risk.  Continue relaxation techniques, emotional support

## 2024-06-19 ENCOUNTER — TELEPHONE (OUTPATIENT)
Age: 89
End: 2024-06-19

## 2024-06-19 NOTE — TELEPHONE ENCOUNTER
Patient's daughter, Griselda, called the RX Refill Line. Message is being forwarded to the office.      Griselda is requesting new orders for physical and speech therapy.    Griselda said that patient has been seeing Ramirez for physical therapy and Latosha for  speech therapy.    Please contact Griselda when orders have been placed, at #[phone]

## 2024-08-12 ENCOUNTER — TELEPHONE (OUTPATIENT)
Age: 89
End: 2024-08-12

## 2024-08-12 NOTE — TELEPHONE ENCOUNTER
Patient  stated that Marycarmen Welsh had told his daughter at the last office visit that patient had a stroke. Pt  is stating that this is not shown on the office visit summary. He wants to verify this since he has and interview set up for long term home care tomorrow at 3:30 pm would like to have this verification before tomorrow.

## 2024-08-13 NOTE — TELEPHONE ENCOUNTER
Spoke to Pt's spouse, Lul, and relayed provider's message regarding MRI/Stroke. Clarified that nothing in the notes from previous appt signified any discussion of Pt having a stroke.   Lul expressed understanding.

## 2024-08-13 NOTE — TELEPHONE ENCOUNTER
I reviewed notes and MRi - there is no evidence of stroke.  There are some mild vascular changes, but no infarct (stroke) noted on mri.

## 2024-08-13 NOTE — TELEPHONE ENCOUNTER
Called Pt Spouse's phone number, spoke to a care giver/friend who stated Pt is sleeping and , Lul, is out at the moment. LM stating [facility] calling in regards to his (Janao's) call yesterday. Will call back later this afternoon.

## 2024-09-03 ENCOUNTER — OFFICE VISIT (OUTPATIENT)
Age: 89
End: 2024-09-03
Payer: MEDICARE

## 2024-09-03 VITALS
SYSTOLIC BLOOD PRESSURE: 118 MMHG | TEMPERATURE: 97.8 F | OXYGEN SATURATION: 96 % | HEART RATE: 74 BPM | DIASTOLIC BLOOD PRESSURE: 70 MMHG

## 2024-09-03 DIAGNOSIS — Z79.899 ENCOUNTER FOR MEDICATION REVIEW: ICD-10-CM

## 2024-09-03 DIAGNOSIS — F33.40 RECURRENT MAJOR DEPRESSIVE DISORDER, IN REMISSION (HCC): ICD-10-CM

## 2024-09-03 DIAGNOSIS — F03.A0 MILD DEMENTIA WITHOUT BEHAVIORAL DISTURBANCE, PSYCHOTIC DISTURBANCE, MOOD DISTURBANCE, OR ANXIETY, UNSPECIFIED DEMENTIA TYPE (HCC): Primary | ICD-10-CM

## 2024-09-03 DIAGNOSIS — R26.81 GAIT INSTABILITY: ICD-10-CM

## 2024-09-03 DIAGNOSIS — R47.01 EXPRESSIVE APHASIA: ICD-10-CM

## 2024-09-03 DIAGNOSIS — F41.9 ANXIETY: ICD-10-CM

## 2024-09-03 PROCEDURE — G2211 COMPLEX E/M VISIT ADD ON: HCPCS | Performed by: NURSE PRACTITIONER

## 2024-09-03 PROCEDURE — 99214 OFFICE O/P EST MOD 30 MIN: CPT | Performed by: NURSE PRACTITIONER

## 2024-09-03 NOTE — PROGRESS NOTES
[facility]  [address], [address]  [place] [address]  [phone]    Social Work Follow-Up    LSW met with patient's spouse and daughter during follow up visit.  MSW made aware of family's recent loss and requesting support and resources for how to approach grief and loss with patient.  During visit, MSW provided supported discussion and reviewed that there is no right and wrong to same, but rather what family feels will be best for patient.  Family expressed understanding and appreciative of discussion regarding same.  MSW provided the following resources:   -Therapeutic Fibbing article  -Alzheimer's Association: To Tell or Not To Tell: Discussing a death with your loved one  -Alzheimer's Association: Grief and the Compounded Losses in Dementia    MSW will remain available as needed.

## 2024-09-03 NOTE — PROGRESS NOTES
Assessment & Plan:   Geriatric Evaluation  Memory  MOCA:  4/30.  Deficits in:  deficits in all domains  Cognition update: family and caregiver feel patients cognition has gotten worse since her hospitalization. Patient has recently started seeing cognitive speech therapy again two times a week. She has difficulty with finding words and short term memory loss.   Pt is dependent with adl and iadls.  Lives with with her .  and cargiver assist with adls and iadls  Pt's current cognitive level is consistent with mild dementia  Memory medications: not on any medications currently  Weight change in 6 mo:  3lb weight loss  Continue to stay active.  Current activity level:pt is dependent on  and caregivers. Does participate in social, cognitive, physical activity. Patient goes to lunch with , daughter, and caregiver. She partakes in word problems to help cognition.   Safety concerns:  family has no concerns for safety at this time. Patient is home with someone at all times.   Caregiver stress:  none at this time  Monitor for changes in mood, sleep, pain control.  Notify provider if any concerns  Optimize all acute and chronic conditions.  Continue to follow-up with PCP and specialist as directed for management  Vascular risk factors:  A-fib, diabetes, HTN, HLD  Ensure adequate hydration, good nutrition  Recommended next follow up:  6 month follow up     Mobility  Baseline ambulation: patient uses a wheelchair for long distances such as appointments, she uses a walker in the home  Fall type: no prior falls  Fall precautions- life alert.  Pt does take coumadin- notify pcp/ED for prompt eval if fall with head strike.     Mood  Baseline mood:  stable  Continue:  family outings, proper sleep schedule   Encourage relaxation techniques, emotional support     Medication Review  Medications are appropriate for current conditions  Patient is at risk for increased side effects secondary to  polypharmacy;  Patient is taking the following medications that meet Beer's Criteria to avoid:  xanax:  consider GDR, dont stop abuptly - can increase fall risk and cog decline  Check with pharmacist/provider before starting any new OTC/prescription medications for potential cognitive side effects      Other Geriatric Syndromes:  5.  Expressive aphasia:  working with ST.  Ensure needs identified and assist as needed.    HPI:  We had the pleasure of evaluating Joyce Orourke who is a 89 y.o. female in Geriatric follow up today.  Previous MOCA:  10/30.  Comorbidities include gait instability, mild dementia  She lives with her   Ms. Orourke is in the office with her caregiver claudia, , and daughter    Memory update per patient caregiver: Patient has been having difficulty with word finding. Care giver helps with bathing and picking out clothes. Caregiver cuts up patients food but patient can feed herself. Patient and caregiver deny changes to appetite. Caregiver feels patient can become frustrated at times when she is unable to find the word she wants to say. Caregiver denies changes in sleep pattern, hallucinations, or mood changes. Caregiver says she does word problems with the patient and does exercises with her. Patient says she does get dizzy when she first wakes up in the morning. Patient and caregiver deny any recent falls.     Memory update per daughter and  (d/t memory concerns):  Pt lost her renato this weekend to STEMI.  Memory about the same.  Speech less.   Caregiver helps with bathing and dressing.  Moving is harder.  She did have a recent fall when reaching for tissues.  She uses purwick at night.  24/7 supervision.  She does wander around the house on occ.  She just restarted ST twice a week.  The hospital stay was a set back.  She liked having activities at rehab.  They do work with her.  She lost a lot of walking and speech.  Caregivers help take her out from time to time.     Current activities:  Cognitive: blocks, word puzzles Physical:  none  Social:  goes out to lunch at times with caregiver      She has difficulty finding the right word while speaking: Yes - words don't want to come out.    Patient requires repeat information or ask the same question repeatedly: Yes    Function update:  Do you do your own bathing assist  Dressing assist- caregiver assists with dressing   Feeding yourself yes  [place] caregiver helps with toileting  Continence no concerns with incontinence , uses a purewick   Transferring steady   Uses :Wheelchair: and walker at home     Can you make your own light meals No - husbands does cooking  Do light cleaning/chores No  Do you take care of your own medications No-  assists with medications  Do you do your own shopping No-  does shopping  Do you handle your own financial affairs such as balancing your checkbook, paying bills, investments: No  Do have any difficulties with handling your financial affairs: Yes-  handles all finances  Do you use a cell phone No  Do you drive: No           Psychosocial update:  Have you or your family noted any change in your mood or personality:Yes- frustration during exercises   Are you currently or have you been treated in the past for depression or anxiety: No  Any hallucination or delusion: No  Sleep Issues: No  Hearing and vision issue: No  ADL/IADL:  caregiver and  help   Appetite/swallow:  appetite is good no changes   Pain:  no pain     Family Review of Behavior [facility]:    pacing. No    agressive/combative behavior. No    agitated. No - does get frustrated when she is unable to find the word she would like to say  wandering. No   resistance to care. No   hoarding/hiding objects.Yes    suspicious  No  withdrawn No  rummaging/pillaging.No    misplacing/losing objects Yes  personal hygiene problems.No  forgetfulness of actions No    ROS: Review of Systems   Unable to perform ROS: Dementia  "(limited by)   Constitutional: Negative.    HENT: Negative.     Eyes: Negative.    Respiratory: Negative.     Cardiovascular: Negative.    Gastrointestinal: Negative.    Genitourinary: Negative.    Neurological:  Positive for dizziness.   Psychiatric/Behavioral: Negative.         Past Medical, surgical, social, medication and allergy history and patients previous records reviewed.  Allergies:   Allergies   Allergen Reactions    Hydrocodone-Acetaminophen Abdominal Pain    Erythromycin GI Intolerance     \"stomach burning\", bruises    Minocycline GI Intolerance     \"stomach burning\", bruises    Penicillins GI Intolerance     \"burning stomach\", bruising    Propoxyphene GI Intolerance    Tetracycline GI Intolerance     \"stomach burning\"-bruising    Vicodin [Hydrocodone-Acetaminophen] GI Intolerance     \"stomach burning\" ,nausea    Prednisone     Avelox [Moxifloxacin] GI Intolerance     Avoids d/t N/V    Percocet [Oxycodone-Acetaminophen] GI Intolerance     Avoids d/t N/V    Sulfa Antibiotics Rash       Medications:      Current Outpatient Medications:     acetaminophen (TYLENOL) 650 mg CR tablet, Take 2 tablets (1,300 mg total) by mouth every 8 (eight) hours as needed for mild pain, Disp: 30 tablet, Rfl: 0    ALPRAZolam (XANAX) 0.5 mg tablet, Take 0.5 mg by mouth daily at bedtime as needed, Disp: , Rfl:     amLODIPine (NORVASC) 5 mg tablet, Take 5 mg by mouth daily, Disp: , Rfl:     brimonidine (ALPHAGAN P) 0.1 %, 1 drop in the morning Each eye, Disp: , Rfl:     Calcium Citrate (CITRACAL PO), Take by mouth every morning, Disp: , Rfl:     CLINDAMYCIN HCL PO, Take 600 mg by mouth as needed Prior to invasive procedures, Disp: , Rfl:     latanoprost (XALATAN) 0.005 % ophthalmic solution, Administer 1 drop to both eyes daily at bedtime  , Disp: , Rfl:     Multiple Minerals-Vitamins (Citracal Plus) TABS, , Disp: , Rfl:     Multiple Vitamins-Minerals (PRESERVISION AREDS 2+MULTI VIT PO), Take 2 tablets by mouth daily  , Disp: , " "Rfl:     Polyethyl Glycol-Propyl Glycol (SYSTANE OP), Apply to eye 4 (four) times a day Each eye, Disp: , Rfl:     pravastatin (PRAVACHOL) 40 mg tablet, Take 40 mg by mouth daily at bedtime, Disp: , Rfl:     warfarin (COUMADIN) 0.5 mg tablet, Take 0.5 mg by mouth daily, Disp: , Rfl:     Cyanocobalamin (B-12) 5000 MCG CAPS, Take by mouth (Patient not taking: Reported on 9/3/2024), Disp: , Rfl:     polyethylene glycol (MIRALAX) 17 g packet, Take 17 g by mouth daily (Patient not taking: Reported on 8/15/2023), Disp: , Rfl: 0    quinapril (ACCUPRIL) 10 mg tablet, Take 20 mg by mouth daily   (Patient not taking: Reported on 9/3/2024), Disp: , Rfl:     Vitals:  There were no vitals filed for this visit.    History:  Past Medical History:   Diagnosis Date    Arthritis     Atrial fibrillation (HCC)     Borderline diabetes     watches diet    Chronic kidney disease, stage 3, mod decreased GFR (HCC)     Dementia (HCC)     per pt /daughter    Diabetes mellitus (HCC)     Dry eye syndrome, bilateral     Full dentures     Gastric ulcer     dx in 9/2017    Glaucoma     had laser    History of mammogram 2018    Hyperlipidemia     controlled    Hypertension     controlled    Irregular heart beat 09/2017    \"pre-mature\" beat-saw cardiologist-12/17-Holter monitor 1/18    Ovarian cyst 07/06/2018    Papanicolaou smear for cervical cancer screening 06/05/2015    Neg     Presence of intracervical pessary     Wears glasses      Past Surgical History:   Procedure Laterality Date    APPENDECTOMY      age 8    CATARACT EXTRACTION Bilateral     COLONOSCOPY  09/13/2017    DILATION AND CURETTAGE OF UTERUS      EGD  01/22/2018    Ulcers healed    EGD AND COLONOSCOPY      ESOPHAGOGASTRIC FUNDOPLASTY  09/13/2017    JOINT REPLACEMENT Bilateral     knees    AR COLPOCLEISIS LE FORT TYPE N/A 6/6/2023    Procedure: LE FORT COLPOCLEISIS;  Surgeon: Cristi Madden MD;  [facility];  Service: UroGynecology           AR " CYSTOURETHROSCOPY N/A 6/6/2023    Procedure: CYSTO;  Surgeon: Cristi Madden MD;  [facility];  Service: UroGynecology           AK POST COLPORRHAPHY RECTOCELE W/WO PERINEORRHAPHY N/A 6/6/2023    Procedure: POSTERIOR COLPORRHAPHY;  Surgeon: Cristi Madden MD;  [facility];  Service: UroGynecology           AK SLING OPERATION STRESS INCONTINENCE N/A 6/6/2023    Procedure: PV SLING;  Surgeon: Cristi Madden MD;  [facility];  Service: UroGynecology           ROTATOR CUFF REPAIR Left     titanium screw    TONSILLECTOMY       Family History   Problem Relation Age of Onset    Cancer Mother         breast    Melanoma Mother         Malignant, internal melanoma     Heart disease Brother         CHF     Social History     Socioeconomic History    Marital status: /Civil Union     Spouse name: Not on file    Number of children: Not on file    Years of education: Not on file    Highest education level: Not on file   Occupational History    Not on file   Tobacco Use    Smoking status: Never    Smokeless tobacco: Never   Vaping Use    Vaping status: Never Used   Substance and Sexual Activity    Alcohol use: Yes     Comment: occ    Drug use: No    Sexual activity: Not Currently     Partners: Male   Other Topics Concern    Not on file   Social History Narrative    No alcohol intake - As per Sudha     Caffeine intake: None    Seat belts used routinely     Advance directive: No     Social Determinants of Health     Financial Resource Strain: Low Risk  (6/21/2024)    Received from [facility]    Overall Financial Resource Strain (CARDIA)     Difficulty of Paying Living Expenses: Not very hard   Food Insecurity: No Food Insecurity (6/21/2024)    Received from [facility]    Hunger Vital Sign     Worried About Running Out of Food in the Last Year: Never true     Ran Out of Food in the Last Year: Never true   Transportation Needs: No  Transportation Needs (6/21/2024)    Received from [facility]    PRAPARE - Transportation     Lack of Transportation (Medical): No     Lack of Transportation (Non-Medical): No   Physical Activity: Not on file   Stress: Stress Concern Present (6/21/2024)    Received from [facility]    [employer] - Occupational Stress Questionnaire     Feeling of Stress : To some extent   Social Connections: Feeling Socially Integrated (6/21/2024)    Received from [facility]    OASIS : Social Isolation     How often do you feel lonely or isolated from those around you?: Rarely   Intimate Partner Violence: Not At Risk (6/21/2024)    Received from [facility]    Humiliation, Afraid, Rape, and Kick questionnaire     Fear of Current or Ex-Partner: No     Emotionally Abused: No     Physically Abused: No     Sexually Abused: No   Housing Stability: Low Risk  (6/21/2024)    Received from [facility]    Housing Stability Vital Sign     Unable to Pay for Housing in the Last Year: No     Number of Times Moved in the Last Year: 1     Homeless in the Last Year: No     Past Surgical History:   Procedure Laterality Date    APPENDECTOMY      age 8    CATARACT EXTRACTION Bilateral     COLONOSCOPY  09/13/2017    DILATION AND CURETTAGE OF UTERUS      EGD  01/22/2018    Ulcers healed    EGD AND COLONOSCOPY      ESOPHAGOGASTRIC FUNDOPLASTY  09/13/2017    JOINT REPLACEMENT Bilateral     knees    GA COLPOCLEISIS LE FORT TYPE N/A 6/6/2023    Procedure: LE FORT COLPOCLEISIS;  Surgeon: Cristi Madden MD;  [facility];  Service: UroGynecology           GA CYSTOURETHROSCOPY N/A 6/6/2023    Procedure: CYSTO;  Surgeon: Cristi Madden MD;  [facility];  Service: UroGynecology           GA POST COLPORRHAPHY RECTOCELE W/WO PERINEORRHAPHY N/A 6/6/2023    Procedure: POSTERIOR COLPORRHAPHY;  Surgeon: Cristi  Stefan Madden MD;  [facility];  Service: UroGynecology           AL SLING OPERATION STRESS INCONTINENCE N/A 6/6/2023    Procedure: PV SLING;  Surgeon: Cristi Madden MD;  [facility];  Service: UroGynecology           ROTATOR CUFF REPAIR Left     titanium screw    TONSILLECTOMY           Physical Exam:  Observed ambulation:  patient in wheelchair    Physical Exam  Vitals reviewed.   Constitutional:       General: She is not in acute distress.     Appearance: Normal appearance. She is not ill-appearing, toxic-appearing or diaphoretic.   HENT:      Head: Normocephalic.   Cardiovascular:      Rate and Rhythm: Normal rate and regular rhythm.      Heart sounds: Normal heart sounds. No murmur heard.     No friction rub. No gallop. No S3 or S4 sounds.   Pulmonary:      Effort: Pulmonary effort is normal. No respiratory distress.      Breath sounds: Normal breath sounds. No wheezing, rhonchi or rales.   Abdominal:      General: Bowel sounds are normal. There is no distension.      Palpations: Abdomen is soft.      Tenderness: There is no abdominal tenderness. There is no guarding or rebound.   Musculoskeletal:         General: Normal range of motion.   Skin:     General: Skin is warm and dry.   Neurological:      General: No focal deficit present.      Mental Status: She is alert. Mental status is at baseline. She is confused.      Cranial Nerves: No cranial nerve deficit.      Comments: Oriented to person, not tps  Pleasant cooperative, forgetful  Word finding issues, can answer y/n questions appropriately

## 2024-10-24 ENCOUNTER — TELEPHONE (OUTPATIENT)
Age: 89
End: 2024-10-24

## 2024-10-24 NOTE — TELEPHONE ENCOUNTER
Daughter called requesting a referral for speech and physical therapy for [facility].     Please advise  Thank you

## 2024-10-24 NOTE — TELEPHONE ENCOUNTER
I completed referral.  Can you find out which daughter Garcia should contact? Also, any specifics concerns for PT/ST or generalized deconditioning and ongoing memory changes?

## 2025-03-04 ENCOUNTER — OFFICE VISIT (OUTPATIENT)
Age: OVER 89
End: 2025-03-04
Payer: MEDICARE

## 2025-03-04 VITALS
BODY MASS INDEX: 32.57 KG/M2 | SYSTOLIC BLOOD PRESSURE: 120 MMHG | WEIGHT: 177 LBS | HEART RATE: 53 BPM | HEIGHT: 62 IN | DIASTOLIC BLOOD PRESSURE: 70 MMHG | TEMPERATURE: 98 F | OXYGEN SATURATION: 87 %

## 2025-03-04 DIAGNOSIS — G30.1 MODERATE LATE ONSET ALZHEIMER'S DEMENTIA WITHOUT BEHAVIORAL DISTURBANCE, PSYCHOTIC DISTURBANCE, MOOD DISTURBANCE, OR ANXIETY (HCC): Primary | ICD-10-CM

## 2025-03-04 DIAGNOSIS — H40.9 GLAUCOMA, UNSPECIFIED GLAUCOMA TYPE, UNSPECIFIED LATERALITY: ICD-10-CM

## 2025-03-04 DIAGNOSIS — F02.B0 MODERATE LATE ONSET ALZHEIMER'S DEMENTIA WITHOUT BEHAVIORAL DISTURBANCE, PSYCHOTIC DISTURBANCE, MOOD DISTURBANCE, OR ANXIETY (HCC): Primary | ICD-10-CM

## 2025-03-04 DIAGNOSIS — R47.01 EXPRESSIVE APHASIA: ICD-10-CM

## 2025-03-04 DIAGNOSIS — F41.9 ANXIETY: ICD-10-CM

## 2025-03-04 DIAGNOSIS — R26.81 GAIT INSTABILITY: ICD-10-CM

## 2025-03-04 PROCEDURE — 99214 OFFICE O/P EST MOD 30 MIN: CPT | Performed by: NURSE PRACTITIONER

## 2025-03-04 PROCEDURE — G2211 COMPLEX E/M VISIT ADD ON: HCPCS | Performed by: NURSE PRACTITIONER

## 2025-03-04 NOTE — PROGRESS NOTES
Assessment & Plan:   Geriatric Evaluation  Memory  MOCA:  not repeated, previously <10  Cognition update: more STM loss, decreased ability to relay needs  Pt is assist adl/dependent iadls.  Lives with her , family involved, has HHA 5 hours/day  Imaging/labs within last 6 mo:  none  Pt's current cognitive level is consistent with moderate dementia, FAST 6b  Memory medications: none  Weight change in last year:  unclear- last recorded weight 193lb 3/2024, today reported weight 177lb  Continue to stay active.  Current activity level:  good.  Participates in social, cognitive, physical activity.  Safety concerns:  pt unable to call out if  has emergency- discussed alternative communications methods- he has cell phone, but not always with.  He had fall alert and land line but gave up.  Consider Stacey or plugged in cell phone - family will discuss  Level of care:  24/7 dependent for assist with adl/safety - family and HHA providing  Caregiver stress:  none, family would like to inc HHA, but  does not want to at this time.  Monitor for changes in mood, sleep, pain control.  Notify provider if any concerns  Optimize all acute and chronic conditions.  Continue to follow-up with PCP and specialist as directed for management  Last GFR 37, Albumin: 3.0 (12/2024).  Ckd3b - renally dose medications, keep hydrated.  Avoid nsaid/nephrotoxins.  F/u with pcp for chronic management  Vascular risk factors:  afib, htn, dm2, hld  Geriatric syndromes:  mild dementia, anxiety, gait instability, expressive aphasia, vision impairment  Changes in chronic conditions:  stable.  Family reports some sob at rest- recommend f/u with pcp/cards to evaluate.  Ensure adequate hydration, good nutrition  Recommended next follow up:  6 months    Mobility  Baseline ambulation: walker/wc.  Fall type: none  PT OT needs:   completed  Pt takes coumadin for afib.  INR 2/2025:  2.6.  Needs prompt f/u with pcp/ED for any fall with head  "strike  Fall precautions    Mood  Baseline mood:  good, frustrated at times  Pharmalogical interventions:  xanax prn  Non pharmalogical interventions:  Encourage relaxation techniques, emotional support.      Medication Review  Medications seem appropriate for current conditions  Patient is at risk for increased side effects secondary to polypharmacy;  Patient is taking the following medications that meet Beer's Criteria to monitor/avoid:  xanax - consider GDR for benzos to lowest dose tolerated, do not stop abruptly.  Can increase cognitive decline and fall risk.  Check with pharmacist/provider before starting any new OTC/prescription medications for potential cognitive side effects    5.  Other Geriatric Syndromes:  Vision impairment:  stable.  Cont adequate lighting, reminders for glasses.  Expressive aphasia:  having harder time with words.  Conts with ST for support.    HPI:  We had the pleasure of evaluating Joyce Orourke who is a 89 y.o. female in Geriatric follow up today.  Previous MOCA:  <10.  Comorbidities include dementia, ambulatory dysfunction, expressive aphasia.  She lives with her   Ms. Orourke is in the office with her daughter Silvano and care giver Khloe    Memory update per patient, daughter, and caregiver (d/t memory concerns):  Caregiver started 8 months ago, has seen memory decline.  Repeating words - \"dizzy\" in am, \"water/hungry\" later.  Harder to figure out needs.  Right wrist bothersome- injection- didn't give second dose= ortho recommended CB ointment daily.  Follows direction pretty well - does get agitated more-  picture board does not help.  Still is going to ST.  They try to get her to say words, doesn't always work.  HHA helps with extra speech exercises and PT exercises.  Does 5 laps w/rollator.  No recent falls.  5 hours HHA, her  doesn't want any more.  He does sometimes get tired.  Sometimes tough in the evening.  He's not getting out a lot.  She has info " on respite.  She has periods of sob at rest = happens a fair amount- they are not sure if she is really sob or if this is fabricated for attention (she seems to do these things at change of shift).    Current activities:  Cognitive:  speech exercises  Physical:  PT exercises  Social:  doing more family activities when weather nicer, family dinner      She has difficulty finding the right word while speaking: Yes  Patient requires repeat information or ask the same question repeatedly: Yes    Orientation questions:    Name: Joyce Orourke  : 35  Age: she's going to be 17  Orientation:  [facility]  Care partner's name and relationship:  Khloe and dgt (Catie)    Function update:  Do you do your own bathing less assist- needs more instruction.  Dressing dependent (assist 3 on 0-10) = helps pull up brief  Feeding yourself indep w/set up  Tolieting assist  Continence occ incont- more accidents (urine more)- timed tolieting- and she lets girls sometimes if she needs  Transferring assist  Uses :Walker: in home, no recent falls    Can you make your own light meals No   Do light cleaning/chores No  Do you take care of your own medications No  Do you do your own shopping No  Do you handle your own financial affairs such as balancing your checkbook, paying bills, investments: No  Do you use a cell phone No  Do you drive: No         Psychosocial update:  Have you or your family noted any change in your mood or personality:No  Are you currently or have you been treated in the past for depression or anxiety: Yes  Any hallucination or delusion:   Sleep Issues: No - sometimes day  Hearing and vision issue: No doing ok - tv is getting very loud   ADL/IADL:  assist/dependent  Appetite/swallow:  good/ok- will ask ST to eval.  She wouldn't drink thickened fluids.    Pain:  OA    Family Review of Behavior [facility]:    pacing. No    agressive/combative behavior.     agitated. Yes   wandering. No   resistance to care. No  "  hoarding/hiding objects.No    suspicious    withdrawn No  rummaging/pillaging.No    misplacing/losing objects Yes - sometimes loses recognition of what's in her hand  personal hygiene problems.No  forgetfulness of actions Yes    ROS: Review of Systems   Unable to perform ROS: Other (expressive aphasia)   Constitutional:  Negative for appetite change.   HENT:  Negative for hearing loss.    Eyes:  Negative for visual disturbance.   Respiratory:  Negative for shortness of breath.    Cardiovascular:  Negative for chest pain.   Gastrointestinal:  Negative for abdominal pain.   Musculoskeletal:  Positive for arthralgias (wrist).   Psychiatric/Behavioral:  Negative for sleep disturbance.        Past Medical, surgical, social, medication and allergy history and patients previous records reviewed.  Allergies:   Allergies   Allergen Reactions    Hydrocodone-Acetaminophen Abdominal Pain    Erythromycin GI Intolerance     \"stomach burning\", bruises    Minocycline GI Intolerance     \"stomach burning\", bruises    Penicillins GI Intolerance     \"burning stomach\", bruising    Propoxyphene GI Intolerance    Tetracycline GI Intolerance     \"stomach burning\"-bruising    Vicodin [Hydrocodone-Acetaminophen] GI Intolerance     \"stomach burning\" ,nausea    Prednisone     Avelox [Moxifloxacin] GI Intolerance     Avoids d/t N/V    Percocet [Oxycodone-Acetaminophen] GI Intolerance     Avoids d/t N/V    Sulfa Antibiotics Rash       Medications:      Current Outpatient Medications:     acetaminophen (TYLENOL) 650 mg CR tablet, Take 2 tablets (1,300 mg total) by mouth every 8 (eight) hours as needed for mild pain, Disp: 30 tablet, Rfl: 0    ALPRAZolam (XANAX) 0.5 mg tablet, Take 0.5 mg by mouth daily at bedtime as needed, Disp: , Rfl:     amLODIPine (NORVASC) 5 mg tablet, Take 5 mg by mouth daily, Disp: , Rfl:     brimonidine (ALPHAGAN P) 0.1 %, 1 drop in the morning Each eye, Disp: , Rfl:     Calcium Citrate (CITRACAL PO), Take by mouth " "every morning, Disp: , Rfl:     CLINDAMYCIN HCL PO, Take 600 mg by mouth as needed Prior to invasive procedures, Disp: , Rfl:     latanoprost (XALATAN) 0.005 % ophthalmic solution, Administer 1 drop to both eyes daily at bedtime  , Disp: , Rfl:     Multiple Minerals-Vitamins (Citracal Plus) TABS, , Disp: , Rfl:     Multiple Vitamins-Minerals (PRESERVISION AREDS 2+MULTI VIT PO), Take 2 tablets by mouth daily  , Disp: , Rfl:     Polyethyl Glycol-Propyl Glycol (SYSTANE OP), Apply to eye 4 (four) times a day Each eye, Disp: , Rfl:     pravastatin (PRAVACHOL) 40 mg tablet, Take 40 mg by mouth daily at bedtime, Disp: , Rfl:     warfarin (COUMADIN) 0.5 mg tablet, Take 0.5 mg by mouth daily, Disp: , Rfl:     Cyanocobalamin (B-12) 5000 MCG CAPS, Take by mouth (Patient not taking: Reported on 9/3/2024), Disp: , Rfl:     quinapril (ACCUPRIL) 10 mg tablet, Take 20 mg by mouth daily   (Patient not taking: Reported on 9/3/2024), Disp: , Rfl:     Vitals:  Vitals:    03/04/25 0920   BP: 120/70   Pulse: (!) 53   Temp: 98 °F (36.7 °C)   SpO2: (!) 87%       History:  Past Medical History:   Diagnosis Date    Arthritis     Atrial fibrillation (HCC)     Borderline diabetes     watches diet    Chronic kidney disease, stage 3, mod decreased GFR (HCC)     Dementia (HCC)     per pt /daughter    Diabetes mellitus (HCC)     Dry eye syndrome, bilateral     Full dentures     Gastric ulcer     dx in 9/2017    Glaucoma     had laser    History of mammogram 2018    Hyperlipidemia     controlled    Hypertension     controlled    Irregular heart beat 09/2017    \"pre-mature\" beat-saw cardiologist-12/17-Holter monitor 1/18    Ovarian cyst 07/06/2018    Papanicolaou smear for cervical cancer screening 06/05/2015    Neg     Presence of intracervical pessary     Wears glasses      Past Surgical History:   Procedure Laterality Date    APPENDECTOMY      age 8    CATARACT EXTRACTION Bilateral     COLONOSCOPY  09/13/2017    DILATION AND CURETTAGE OF " UTERUS      EGD  01/22/2018    Ulcers healed    EGD AND COLONOSCOPY      ESOPHAGOGASTRIC FUNDOPLASTY  09/13/2017    JOINT REPLACEMENT Bilateral     knees    OR COLPOCLEISIS LE FORT TYPE N/A 6/6/2023    Procedure: LE FORT COLPOCLEISIS;  Surgeon: Cristi Madden MD;  [facility];  Service: UroGynecology           OR CYSTOURETHROSCOPY N/A 6/6/2023    Procedure: CYSTO;  Surgeon: Cristi Madden MD;  [facility];  Service: UroGynecology           OR POST COLPORRHAPHY RECTOCELE W/WO PERINEORRHAPHY N/A 6/6/2023    Procedure: POSTERIOR COLPORRHAPHY;  Surgeon: Cristi Madden MD;  [facility];  Service: UroGynecology           OR SLING OPERATION STRESS INCONTINENCE N/A 6/6/2023    Procedure: PV SLING;  Surgeon: Cristi Madden MD;  [facility];  Service: UroGynecology           ROTATOR CUFF REPAIR Left     titanium screw    TONSILLECTOMY       Family History   Problem Relation Age of Onset    Cancer Mother         breast    Melanoma Mother         Malignant, internal melanoma     Heart disease Brother         CHF     Social History     Socioeconomic History    Marital status: /Civil Union     Spouse name: Not on file    Number of children: Not on file    Years of education: Not on file    Highest education level: Not on file   Occupational History    Not on file   Tobacco Use    Smoking status: Never    Smokeless tobacco: Never   Vaping Use    Vaping status: Never Used   Substance and Sexual Activity    Alcohol use: Yes     Comment: occ    Drug use: No    Sexual activity: Not Currently     Partners: Male   Other Topics Concern    Not on file   Social History Narrative    No alcohol intake - As per [place]     Caffeine intake: None    Seat belts used routinely     Advance directive: No     Social Drivers of Health     Financial Resource Strain: Low Risk  (11/30/2024)    Received from [facility]    Overall Financial Resource  Strain (CARDIA)     Difficulty of Paying Living Expenses: Not very hard   Food Insecurity: No Food Insecurity (11/30/2024)    Received from [facility]    Hunger Vital Sign     Worried About Running Out of Food in the Last Year: Never true     Ran Out of Food in the Last Year: Never true   Transportation Needs: No Transportation Needs (11/30/2024)    Received from [facility]    PRAPARE - Transportation     Lack of Transportation (Medical): No     Lack of Transportation (Non-Medical): No   Physical Activity: Not on file   Stress: Stress Concern Present (6/21/2024)    Received from [facility]    [employer] [place] [employer] - Occupational Stress Questionnaire     Feeling of Stress : To some extent   Social Connections: Feeling Socially Integrated (6/21/2024)    Received from [facility]    OASIS : Social Isolation     How often do you feel lonely or isolated from those around you?: Rarely   Intimate Partner Violence: Not At Risk (11/30/2024)    Received from [facility], [facility]    Humiliation, Afraid, Rape, and Kick questionnaire     Fear of Current or Ex-Partner: No     Emotionally Abused: No     Physically Abused: No     Sexually Abused: No   Housing Stability: Low Risk  (11/30/2024)    Received from [facility]    Housing Stability Vital Sign     Unable to Pay for Housing in the Last Year: No     Number of Times Moved in the Last Year: 0     Homeless in the Last Year: No     Past Surgical History:   Procedure Laterality Date    APPENDECTOMY      age 8    CATARACT EXTRACTION Bilateral     COLONOSCOPY  09/13/2017    DILATION AND CURETTAGE OF UTERUS      EGD  01/22/2018    Ulcers healed    EGD AND COLONOSCOPY      ESOPHAGOGASTRIC FUNDOPLASTY  09/13/2017    JOINT REPLACEMENT Bilateral     knees    IN COLPOCLEISIS LE FORT TYPE N/A 6/6/2023    Procedure: LE FORT COLPOCLEISIS;   Surgeon: Cristi Madden MD;  [facility];  Service: UroGynecology           AL CYSTOURETHROSCOPY N/A 6/6/2023    Procedure: CYSTO;  Surgeon: Cristi Madden MD;  [facility];  Service: UroGynecology           AL POST COLPORRHAPHY RECTOCELE W/WO PERINEORRHAPHY N/A 6/6/2023    Procedure: POSTERIOR COLPORRHAPHY;  Surgeon: Cristi Madden MD;  [facility];  Service: UroGynecology           AL SLING OPERATION STRESS INCONTINENCE N/A 6/6/2023    Procedure: PV SLING;  Surgeon: Cristi Madden MD;  [facility];  Service: UroGynecology           ROTATOR CUFF REPAIR Left     titanium screw    TONSILLECTOMY           Physical Exam:  Observed ambulation:  seen in    Physical Exam  Vitals and nursing note reviewed.   Constitutional:       General: She is not in acute distress.     Appearance: Normal appearance. She is well-developed. She is not diaphoretic.   HENT:      Head: Normocephalic.   Cardiovascular:      Rate and Rhythm: Normal rate. Rhythm irregular.      Heart sounds: No murmur heard.     No friction rub. No gallop.      Comments: Auscultated HR 80  Pulse ox recheck 93%  Pulmonary:      Effort: Pulmonary effort is normal. No respiratory distress.      Breath sounds: Normal breath sounds. No wheezing or rales.   Abdominal:      General: Bowel sounds are normal. There is no distension.      Palpations: Abdomen is soft.      Tenderness: There is no abdominal tenderness. There is no rebound.   Musculoskeletal:         General: Normal range of motion.   Skin:     General: Skin is warm and dry.   Neurological:      General: No focal deficit present.      Mental Status: She is alert. Mental status is at baseline.      Comments: Word finding issues at baseline  Answers y/n questions appropriately  Engages in convo   Psychiatric:         Mood and Affect: Mood normal.         Behavior: Behavior normal.

## (undated) DEVICE — ELECTROSURGICAL DEVICE HOLSTER;FOR USE WITH MAXIMUM PEAK VOLTAGE OF 4000 V: Brand: FORCE TRIVERSE

## (undated) DEVICE — DRAPE EQUIPMENT RF WAND

## (undated) DEVICE — SUT VICRYL 0 CT-1 CR/8 27 IN JJ41G

## (undated) DEVICE — SUT PDS II 0 CT-1 36 IN Z346H

## (undated) DEVICE — NEEDLE COUNTER LG W/RULER

## (undated) DEVICE — ALLENTOWN DR  LUCENTE S LAP PK: Brand: CARDINAL HEALTH

## (undated) DEVICE — IV FLUSH NSS 10ML POSIFLUSH

## (undated) DEVICE — SUT VICRYL 2-0 SH 27 IN UNDYED J417H

## (undated) DEVICE — MEDI-VAC YANKAUER SUCTION HANDLE W/BULBOUS AND CONTROL VENT: Brand: CARDINAL HEALTH

## (undated) DEVICE — CATH FOLEY 18FR 5ML 2 WAY UNCOATED SILICONE

## (undated) DEVICE — CYSTO TUBING SINGLE IRRIGATION

## (undated) DEVICE — GAMMEX® NON-LATEX SENSITIVE SIZE 7.5, STERILE NEOPRENE POWDER-FREE SURGICAL GLOVE: Brand: GAMMEX

## (undated) DEVICE — SMOKE EVACUATION TUBING WITH 8 IN INTEGRAL WAND AND SPONGE GUARD: Brand: BUFFALO FILTER

## (undated) DEVICE — NEEDLE HYPO 22G X 1-1/2 IN

## (undated) DEVICE — MEDI-VAC YANKAUER SUCTION HANDLE W/STRAIGHT TIP & CONTROL VENT: Brand: CARDINAL HEALTH

## (undated) DEVICE — PREMIUM DRY TRAY LF: Brand: MEDLINE INDUSTRIES, INC.

## (undated) DEVICE — STRL ALLENTOWN HYSTEROSCOPY PK: Brand: CARDINAL HEALTH

## (undated) DEVICE — STAYS ELASTIC 5MM SHARP HOOK LONE STAR

## (undated) DEVICE — IV EXTENSION TUBING 33 IN

## (undated) DEVICE — GLOVE PI ULTRA TOUCH SZ.7.0

## (undated) DEVICE — BASIC SINGLE BASIN 2-LF: Brand: MEDLINE INDUSTRIES, INC.

## (undated) DEVICE — PVC URETHRAL CATHETER: Brand: DOVER

## (undated) DEVICE — DECANTER: Brand: UNBRANDED

## (undated) DEVICE — SUT VICRYL 0 CT-1 36 IN J946H

## (undated) DEVICE — RETRACTOR RING 14.1 X 14.1 CM DISP

## (undated) DEVICE — 2000CC GUARDIAN II: Brand: GUARDIAN

## (undated) DEVICE — PLUMEPEN PRO 10FT

## (undated) DEVICE — SCD SEQUENTIAL COMPRESSION COMFORT SLEEVE MEDIUM KNEE LENGTH: Brand: KENDALL SCD

## (undated) DEVICE — TUBING SUCTION 5MM X 12 FT

## (undated) DEVICE — CAUTERY TIP POLISHER: Brand: DEVON

## (undated) DEVICE — BAG URINE DRAINAGE 2000ML ANTI RFLX LF

## (undated) DEVICE — EXIDINE 4 PCT

## (undated) DEVICE — UNDER BUTTOCKS DRAPE W/FLUID CONTROL POUCH: Brand: CONVERTORS

## (undated) DEVICE — INTENDED FOR TISSUE SEPARATION, AND OTHER PROCEDURES THAT REQUIRE A SHARP SURGICAL BLADE TO PUNCTURE OR CUT.: Brand: BARD-PARKER SAFETY BLADES SIZE 11, STERILE

## (undated) DEVICE — BULB SYRINGE,IRRIGATION WITH PROTECTIVE CAP: Brand: DOVER

## (undated) DEVICE — 3M™ STERI-DRAPE™ UNDER BUTTOCKS DRAPE WITH POUCH 1084: Brand: STERI-DRAPE™